# Patient Record
Sex: FEMALE | Race: WHITE | Employment: FULL TIME | URBAN - METROPOLITAN AREA
[De-identification: names, ages, dates, MRNs, and addresses within clinical notes are randomized per-mention and may not be internally consistent; named-entity substitution may affect disease eponyms.]

---

## 2017-01-20 ENCOUNTER — HOSPITAL ENCOUNTER (OUTPATIENT)
Dept: LAB | Age: 67
Discharge: HOME | End: 2017-01-20
Payer: COMMERCIAL

## 2017-01-20 ENCOUNTER — OFFICE VISIT (OUTPATIENT)
Dept: INTERNAL MEDICINE CLINIC | Age: 67
End: 2017-01-20

## 2017-01-20 VITALS
TEMPERATURE: 98.2 F | HEIGHT: 65 IN | OXYGEN SATURATION: 97 % | WEIGHT: 202.6 LBS | RESPIRATION RATE: 16 BRPM | SYSTOLIC BLOOD PRESSURE: 138 MMHG | HEART RATE: 71 BPM | BODY MASS INDEX: 33.76 KG/M2 | DIASTOLIC BLOOD PRESSURE: 80 MMHG

## 2017-01-20 DIAGNOSIS — R10.11 RIGHT UPPER QUADRANT ABDOMINAL PAIN: Primary | ICD-10-CM

## 2017-01-20 DIAGNOSIS — R10.11 RIGHT UPPER QUADRANT ABDOMINAL PAIN: ICD-10-CM

## 2017-01-20 LAB
ALBUMIN SERPL BCP-MCNC: 4.4 G/DL (ref 3.4–5)
ALBUMIN/GLOB SERPL: 1.6 {RATIO} (ref 0.8–1.7)
ALP SERPL-CCNC: 42 U/L (ref 45–117)
ALT SERPL-CCNC: 26 U/L (ref 13–56)
ANION GAP BLD CALC-SCNC: 11 MMOL/L (ref 3–18)
AST SERPL W P-5'-P-CCNC: 15 U/L (ref 15–37)
BASOPHILS # BLD AUTO: 0.1 K/UL (ref 0–0.06)
BASOPHILS # BLD: 1 % (ref 0–2)
BILIRUB DIRECT SERPL-MCNC: 0.2 MG/DL (ref 0–0.2)
BILIRUB SERPL-MCNC: 0.5 MG/DL (ref 0.2–1)
BUN SERPL-MCNC: 21 MG/DL (ref 7–18)
BUN/CREAT SERPL: 23 (ref 12–20)
CALCIUM SERPL-MCNC: 9.4 MG/DL (ref 8.5–10.1)
CHLORIDE SERPL-SCNC: 98 MMOL/L (ref 100–108)
CO2 SERPL-SCNC: 28 MMOL/L (ref 21–32)
CREAT SERPL-MCNC: 0.91 MG/DL (ref 0.6–1.3)
DIFFERENTIAL METHOD BLD: ABNORMAL
EOSINOPHIL # BLD: 0.3 K/UL (ref 0–0.4)
EOSINOPHIL NFR BLD: 3 % (ref 0–5)
ERYTHROCYTE [DISTWIDTH] IN BLOOD BY AUTOMATED COUNT: 13.8 % (ref 11.6–14.5)
GLOBULIN SER CALC-MCNC: 2.7 G/DL (ref 2–4)
GLUCOSE SERPL-MCNC: 99 MG/DL (ref 74–99)
HCT VFR BLD AUTO: 35.1 % (ref 35–45)
HGB BLD-MCNC: 11.2 G/DL (ref 12–16)
LIPASE SERPL-CCNC: 368 U/L (ref 73–393)
LYMPHOCYTES # BLD AUTO: 30 % (ref 21–52)
LYMPHOCYTES # BLD: 2.8 K/UL (ref 0.9–3.6)
MCH RBC QN AUTO: 26.7 PG (ref 24–34)
MCHC RBC AUTO-ENTMCNC: 31.9 G/DL (ref 31–37)
MCV RBC AUTO: 83.8 FL (ref 74–97)
MONOCYTES # BLD: 0.7 K/UL (ref 0.05–1.2)
MONOCYTES NFR BLD AUTO: 7 % (ref 3–10)
NEUTS SEG # BLD: 5.7 K/UL (ref 1.8–8)
NEUTS SEG NFR BLD AUTO: 59 % (ref 40–73)
PLATELET # BLD AUTO: 251 K/UL (ref 135–420)
PMV BLD AUTO: 11 FL (ref 9.2–11.8)
POTASSIUM SERPL-SCNC: 4.3 MMOL/L (ref 3.5–5.5)
PROT SERPL-MCNC: 7.1 G/DL (ref 6.4–8.2)
RBC # BLD AUTO: 4.19 M/UL (ref 4.2–5.3)
SODIUM SERPL-SCNC: 137 MMOL/L (ref 136–145)
WBC # BLD AUTO: 9.4 K/UL (ref 4.6–13.2)

## 2017-01-20 PROCEDURE — 82248 BILIRUBIN DIRECT: CPT | Performed by: PHYSICIAN ASSISTANT

## 2017-01-20 PROCEDURE — 80053 COMPREHEN METABOLIC PANEL: CPT | Performed by: PHYSICIAN ASSISTANT

## 2017-01-20 PROCEDURE — 36415 COLL VENOUS BLD VENIPUNCTURE: CPT | Performed by: PHYSICIAN ASSISTANT

## 2017-01-20 PROCEDURE — 85025 COMPLETE CBC W/AUTO DIFF WBC: CPT | Performed by: PHYSICIAN ASSISTANT

## 2017-01-20 PROCEDURE — 83690 ASSAY OF LIPASE: CPT | Performed by: PHYSICIAN ASSISTANT

## 2017-01-20 NOTE — PATIENT INSTRUCTIONS
Learning About Acute Cholecystitis  What is cholecystitis? Cholecystitis (say \"koh-lih-sis-TY-tus\") is inflammation of the gallbladder. The gallbladder stores bile. Bile helps the body digest food. Normally, the bile flows from the gallbladder to the small intestine. A gallstone stuck in the cystic duct is most often the cause of sudden (acute) cholecystitis. The cystic duct is the tube that carries the bile out of the gallbladder. The gallstone blocks the bile from leaving the gallbladder. This results in an irritated and swollen gallbladder. The disease can also be caused by infection or trauma, such as an injury from a car accident. Cholecystitis has to be treated right away. You will probably have to go to the hospital. Surgery is the usual treatment. What are the symptoms? Symptoms include:  · Steady and severe pain in the upper right part of belly. This is the most common symptom. The pain can sometimes move to your back or right shoulder blade. It may last for more than 6 hours. · Nausea or vomiting. · A fever. How is it treated? The main way to treat this disease is surgery to remove the gallbladder. This surgery can often be done through small cuts (incisions) in the belly. This is called a laparoscopic cholecystectomy. In some cases, you may need a more extensive surgery. You may need surgery as soon as possible. The doctor may try to reduce swelling and irritation in the gallbladder before removing it. You may be given fluids and antibiotics through an IV. You may also be given pain medicine. Follow-up care is a key part of your treatment and safety. Be sure to make and go to all appointments, and call your doctor if you are having problems. It's also a good idea to know your test results and keep a list of the medicines you take. Where can you learn more? Go to http://sravan-arcadio.info/.   Enter T940 in the search box to learn more about \"Learning About Acute Cholecystitis. \"  Current as of: August 9, 2016  Content Version: 11.1  © 4712-5848 [employer]. Care instructions adapted under license by [employer] (which disclaims liability or warranty for this information). If you have questions about a medical condition or this instruction, always ask your healthcare professional. [address] any warranty or liability for your use of this information. Ultrasound of the Abdomen: About This Test  What is it? An abdominal ultrasound uses reflected sound waves to produce a picture of the organs and blood vessels in the upper belly. These include your liver, gallbladder, spleen, pancreas, kidneys, and major blood vessels like the aorta. The sound waves create a picture on a video monitor. Why is this test done? An ultrasound can be done to:  · Find out what's causing belly pain--a gallstone, for example. · Look at a lump or mass that was found in a prior exam and, in some cases, tell what it is. · Check for problems in the liver and kidneys. · Look for changes in an aortic aneurysm, a bulging section in the wall of the large artery that carries blood out of the heart. · Find fluid in the belly. · Guide needles or other medical instruments in treatment. How can you prepare for the test?  · Depending on the part of your belly your doctor will be looking at, you may need to eat a fat-free meal the night before your test. Or you may need to avoid eating for 4 to 12 hours before the test. Your doctor will tell you what to do. What happens before the test?  · You will need to take off any jewelry that might interfere with the ultrasound test.  · You will need to take off your clothes around the area to be scanned. You will get a cloth or paper covering to use during the test.  What happens during the test?  · You lie down on your back or your side on an exam table.   · The doctor or technologist spreads some warm gel on your belly to improve the transmission of the sound waves. A small handheld unit called a transducer is pressed against your belly and is moved back and forth. A picture of the organs and blood vessels can be seen on a video monitor. · You need to lie still. You may be asked to take a breath and hold it for several seconds during the scanning. What else should you know about the test?  · The ultrasound is a very safe procedure. · There is no discomfort from the test itself. If your belly hurts already, you will feel some pain from the probe being pressed down on it. · You will not hear or feel the sound waves. How long does the test take? · The test will take about 30 minutes. · You may be asked to wait until the radiologist has reviewed the scan. He or she may want to do more ultrasound views of some areas of your belly. What happens after the test?  · You will probably be able to go home right away. · You can go back to your usual activities right away. Follow-up care is a key part of your treatment and safety. Be sure to make and go to all appointments, and call your doctor if you are having problems. It's also a good idea to keep a list of the medicines you take. Ask your doctor when you can expect to have your test results. Where can you learn more? Go to http://sravan-arcadio.info/. Enter T376 in the search box to learn more about \"Ultrasound of the Abdomen: About This Test.\"  Current as of: February 19, 2016  Content Version: 11.1  © 9351-4505 [employer]. Care instructions adapted under license by [employer] (which disclaims liability or warranty for this information). If you have questions about a medical condition or this instruction, always ask your healthcare professional. [place] [address] any warranty or liability for your use of this information.        Abdominal Pain: Care Instructions  Your Care Instructions    Abdominal pain has many possible causes. Some aren't serious and get better on their own in a few days. Others need more testing and treatment. If your pain continues or gets worse, you need to be rechecked and may need more tests to find out what is wrong. You may need surgery to correct the problem. Don't ignore new symptoms, such as fever, nausea and vomiting, urination problems, pain that gets worse, and dizziness. These may be signs of a more serious problem. Your doctor may have recommended a follow-up visit in the next 8 to 12 hours. If you are not getting better, you may need more tests or treatment. The doctor has checked you carefully, but problems can develop later. If you notice any problems or new symptoms, get medical treatment right away. Follow-up care is a key part of your treatment and safety. Be sure to make and go to all appointments, and call your doctor if you are having problems. It's also a good idea to know your test results and keep a list of the medicines you take. How can you care for yourself at home? · Rest until you feel better. · To prevent dehydration, drink plenty of fluids, enough so that your urine is light yellow or clear like water. Choose water and other caffeine-free clear liquids until you feel better. If you have kidney, heart, or liver disease and have to limit fluids, talk with your doctor before you increase the amount of fluids you drink. · If your stomach is upset, eat mild foods, such as rice, dry toast or crackers, bananas, and applesauce. Try eating several small meals instead of two or three large ones. · Wait until 48 hours after all symptoms have gone away before you have spicy foods, alcohol, and drinks that contain caffeine. · Do not eat foods that are high in fat. · Avoid anti-inflammatory medicines such as aspirin, ibuprofen (Advil, Motrin), and naproxen (Aleve). These can cause stomach upset. Talk to your doctor if you take daily aspirin for another health problem.   When should you call for help? Call 911 anytime you think you may need emergency care. For example, call if:  · You passed out (lost consciousness). · You pass maroon or very bloody stools. · You vomit blood or what looks like coffee grounds. · You have new, severe belly pain. Call your doctor now or seek immediate medical care if:  · Your pain gets worse, especially if it becomes focused in one area of your belly. · You have a new or higher fever. · Your stools are black and look like tar, or they have streaks of blood. · You have unexpected vaginal bleeding. · You have symptoms of a urinary tract infection. These may include:  ¨ Pain when you urinate. ¨ Urinating more often than usual.  ¨ Blood in your urine. · You are dizzy or lightheaded, or you feel like you may faint. Watch closely for changes in your health, and be sure to contact your doctor if:  · You are not getting better after 1 day (24 hours). Where can you learn more? Go to http://sravan-arcadio.info/. Enter V350 in the search box to learn more about \"Abdominal Pain: Care Instructions. \"  Current as of: May 27, 2016  Content Version: 11.1  © 7101-2451 [employer]. Care instructions adapted under license by [employer] (which disclaims liability or warranty for this information). If you have questions about a medical condition or this instruction, always ask your healthcare professional. [place] [address] any warranty or liability for your use of this information.

## 2017-01-20 NOTE — PROGRESS NOTES
Gallbladder History and Physical    Subjective:     Patient is a 78 y.o.  female who presents with abdominal pain. Onset of symptoms was yesterday evening before dinner. with rapidly worsening course since that time then abatement of symptoms during the early morning after taking Aleve and resting. The pain is located in the RUQ without radiation. Patient describes the pain as sharp and 9/10 at its worst and 1/10 dull but consistent at its least.  Pain is not related to meals. She denies CP, SOB, dizziness, palpitations, recent trauma or events inciting possible M/S trauma. ROS as pertinent outside of HPI:  General: alert, oriented x 3 and in no acute distress  GI: denies blood/tarry stools, denies nausea, vomiting, constipation or diarrhea. She denies pruritis.     : denies vaginal bleeding, cramping, dysuria      Patient Active Problem List    Diagnosis Date Noted    Lung nodules 12/08/2016    Cataract of both eyes 05/17/2016    Dry eyes, bilateral 05/17/2016    Vitreous floaters of both eyes 05/17/2016    Arthritis of knee 02/08/2016    Primary osteoarthritis of right knee 01/23/2016    Absolute anemia 12/04/2015    Left thyroid nodule 06/10/2014    Hyperlipidemia 06/22/2010    GERD (gastroesophageal reflux disease) 06/22/2010    Gout 06/22/2010    Vitamin D deficiency 06/22/2010    Type 2 diabetes mellitus ([address] 06/22/2010    HTN (hypertension) 06/22/2010     Past Medical History   Diagnosis Date    Arthritis     Candida intertrigo 7/11/2011    Diabetes ([address]     GERD (gastroesophageal reflux disease)     Gout     Hypercholesterolemia     Hypertension       Past Surgical History   Procedure Laterality Date    Pr colonoscopy flx dx w/collj spec when pfrmd  7-23-09     normal;     Hx urological  1998; 1999     Bladder surgery    Hx breast biopsy       Calcium deposits    Hx saqib and bso      Hx dilation and curettage      Hx hysterectomy      Hx orthopaedic      Hx knee replacement       Right    Hx heent  07/2016     cataract surgery bilaterally      Social History   Substance Use Topics    Smoking status: Never Smoker    Smokeless tobacco: Never Used    Alcohol use No      Family History   Problem Relation Age of Onset    Hypertension Father     Heart Disease Father     Hypertension Brother     Diabetes Brother     Heart Disease Mother     Breast Cancer Other       Prior to Admission medications    Medication Sig Start Date End Date Taking? Authorizing Provider   atorvastatin (LIPITOR) 80 mg tablet Take 1 Tab by mouth daily. 1/2/17  Yes Cecelia Gomez DO   metFORMIN (GLUCOPHAGE) 1,000 mg tablet Take 1 Tab by mouth two (2) times daily (with meals). 1/2/17  Yes Cecelia Gomez DO   potassium chloride (K-DUR, KLOR-CON) 20 mEq tablet Take 1 Tab by mouth daily. 10/11/16  Yes Cecelia Gomez DO   valsartan-hydrochlorothiazide (DIOVAN-HCT) 160-25 mg per tablet take 1 tablet by mouth once daily 10/11/16  Yes Cecelia Gomez DO   fenofibrate nanocrystallized (TRICOR) 145 mg tablet Take 1 Tab by mouth daily. 10/11/16  Yes Cecelia Gomez DO   ferrous sulfate 325 mg (65 mg iron) tablet Take 1 Tab by mouth Daily (before breakfast). 10/11/16  Yes Cecelia Gomez DO   docusate sodium (COLACE) 100 mg capsule Take 1 capsule three times per week as needed for constipation 10/11/16  Yes Cecelia Gomez DO   allopurinol (ZYLOPRIM) 300 mg tablet Take 1 Tab by mouth daily. 9/1/16  Yes Cecelia Gomez DO   aspirin delayed-release 81 mg tablet Take  by mouth daily. Yes Historical Provider   cyanocobalamin ER (VITAMIN B-12) 1,000 mcg tablet Take 1 tablet by mouth daily. 10/13/14  Yes Chipper Dakins, MD   cholecalciferol (VITAMIN D3) 5,000 unit capsule Take 5,000 Units by mouth daily. Yes Historical Provider   omeprazole (PRILOSEC OTC) 20 mg tablet Take 20 mg by mouth daily.    Yes Historical Provider     Allergies   Allergen Reactions    Sulfa (Sulfonamide Antibiotics) Other (comments)     nightmares            Objective:     Physical Exam:      OBJECTIVE:     Visit Vitals    /80 (BP 1 Location: Left arm, BP Patient Position: Sitting)    Pulse 71    Temp 98.2 °F (36.8 °C) (Oral)    Resp 16    Ht 5' 5\" (1.651 m)    Wt 202 lb 9.6 oz (91.9 kg)    SpO2 97%    BMI 33.71 kg/m2     HEENT:ENT normal.  Neck supple. No adenopathy   Chest: Lungs are clear, good air entry, no wheezes, rhonchi or rales. Cardiovascular: S1 and S2 normal, no murmurs, regular rate and rhythm. Abdomen: NBS, soft, no guarding, mass or organomegaly, negative Rosving's, positive Buenrostro's   Extremities: show no edema  Neurological: is normal, no focal findings. M/S: pain not reproduced with general movement or stretch, good ROM with upper body movement. Assessment and Plan:      Lolly was seen today for side pain. Diagnoses and all orders for this visit:    Right upper quadrant abdominal pain  -     CBC WITH AUTOMATED DIFF; Future  -     METABOLIC PANEL, COMPREHENSIVE; Future  -     LIPASE; Future  -     HEPATIC FUNCTION PANEL; Future  -     US GALLBLADDER; Future      Medication and side effects, including risks and signs of allergy were discussed. Patient given printed information with diagnoses and medication information. Answered all questions and patient acknowledged understanding. Patient agrees to follow up with PCP as suggested or sooner if symptoms worsen. Patient made aware that eating fatty foods may increase pain, if associated with the gallbladder. Follow up after ultrasound. Ms. Calles has a reminder for a \"due or due soon\" health maintenance. I have asked that she contact her primary care provider for follow-up on this health maintenance. Dionne Robles MPA, PA-C     I reviewed the patient's medical history, the physician assistant's findings on physical examination, the patient's diagnoses, and treatment plan as documented in the progress note.  I concur with the treatment plan as documented. There are no additional recommendations at this time.     Mic Bray MD

## 2017-01-20 NOTE — MR AVS SNAPSHOT
Visit Information Date & Time Provider Department Dept. Phone Encounter #  
 1/20/2017  1:00 PM Grazyna Haley PA-C Internists at [facility] [id number] Follow-up Instructions Return in about 1 week (around 1/27/2017), or if symptoms worsen or fail to improve, for PCP. Your Appointments 4/4/2017  8:05 AM  
LAB with Alen Alberts DO Internists at [facility] (--) Appt Note: 6 month follow up and labs [address] [address]  
[phone]  
  
   
 [address] [id number] 4/11/2017  1:15 PM  
Office Visit with Alen Alberts DO Internists at [facility] (--) Appt Note: 6 month follow up and labs [address] [id number] [phone]  
  
   
 [address],[facility] [address] [phone] Upcoming Health Maintenance Date Due Hepatitis C Screening 1950 DTaP/Tdap/Td series (1 - Tdap) 10/9/2008 ZOSTER VACCINE AGE 60> 6/2/2009 Pneumococcal 65+ Low/Medium Risk (2 of 2 - PCV13) 6/11/2015 FOOT EXAM Q1 6/23/2016 HEMOGLOBIN A1C Q6M 4/4/2017 EYE EXAM RETINAL OR DILATED Q1 6/27/2017 MICROALBUMIN Q1 10/4/2017 LIPID PANEL Q1 10/4/2017 MEDICARE YEARLY EXAM 10/12/2017 GLAUCOMA SCREENING Q2Y 6/27/2018 BREAST CANCER SCRN MAMMOGRAM 8/25/2018 COLONOSCOPY 7/23/2019 Allergies as of 1/20/2017  Review Complete On: 1/20/2017 By: Grazyna Haley PA-C Severity Noted Reaction Type Reactions Sulfa (Sulfonamide Antibiotics)  06/22/2010    Other (comments)  
 nightmares Current Immunizations  Reviewed on 10/13/2014 Name Date Influenza Vaccine 10/13/2014, 12/16/2013, 12/24/2012 Influenza Vaccine (Quad) PF 10/11/2016, 12/1/2015  2:30 PM  
 Influenza Vaccine Split 11/30/2011 Pneumococcal Polysaccharide (PPSV-23) 6/11/2014 TD Vaccine 10/8/2008 Not reviewed this visit You Were Diagnosed With   
  
 Codes Comments Right upper quadrant abdominal pain    -  Primary ICD-10-CM: R10.11 ICD-9-CM: 789.01 Vitals BP Pulse Temp Resp Height(growth percentile) Weight(growth percentile) 138/80 (BP 1 Location: Left arm, BP Patient Position: Sitting) 71 98.2 °F (36.8 °C) (Oral) 16 5' 5\" (1.651 m) 202 lb 9.6 oz (91.9 kg) SpO2 BMI OB Status Smoking Status 97% 33.71 kg/m2 Hysterectomy Never Smoker Vitals History BMI and BSA Data Body Mass Index Body Surface Area 33.71 kg/m 2 2.05 m 2 Preferred Pharmacy Pharmacy Name [facility] [address] [place], [place] [address] [place] [address] [phone] Your Updated Medication List  
  
   
This list is accurate as of: 1/20/17  2:09 PM.  Always use your most recent med list.  
  
  
  
  
 allopurinol 300 mg tablet Commonly known as:  Asia Huh Take 1 Tab by mouth daily. aspirin delayed-release 81 mg tablet Take  by mouth daily. atorvastatin 80 mg tablet Commonly known as:  LIPITOR Take 1 Tab by mouth daily. cholecalciferol 5,000 unit capsule Commonly known as:  VITAMIN D3 Take 5,000 Units by mouth daily. cyanocobalamin ER 1,000 mcg tablet Commonly known as:  VITAMIN B-12 Take 1 tablet by mouth daily. docusate sodium 100 mg capsule Commonly known as:  Bessie Lowers Take 1 capsule three times per week as needed for constipation  
  
 fenofibrate nanocrystallized 145 mg tablet Commonly known as:  [employer] Take 1 Tab by mouth daily. ferrous sulfate 325 mg (65 mg iron) tablet Take 1 Tab by mouth Daily (before breakfast). metFORMIN 1,000 mg tablet Commonly known as:  GLUCOPHAGE Take 1 Tab by mouth two (2) times daily (with meals). potassium chloride 20 mEq tablet Commonly known as:  K-DUR, KLOR-CON Take 1 Tab by mouth daily. PriLOSEC OTC 20 mg tablet Generic drug:  omeprazole Take 20 mg by mouth daily. valsartan-hydroCHLOROthiazide 160-25 mg per tablet Commonly known as:  DIOVAN-HCT  
take 1 tablet by mouth once daily Follow-up Instructions Return in about 1 week (around 1/27/2017), or if symptoms worsen or fail to improve, for PCP. To-Do List   
 01/20/2017 Lab:  CBC WITH AUTOMATED DIFF   
  
 01/20/2017 Lab:  HEPATIC FUNCTION PANEL   
  
 01/20/2017 Lab:  LIPASE   
  
 01/20/2017 Lab:  METABOLIC PANEL, COMPREHENSIVE   
  
 01/20/2017 Imaging:  US GALLBLADDER Patient Instructions Learning About Acute Cholecystitis What is cholecystitis? Cholecystitis (say \"koh-lih-sis-TY-tus\") is inflammation of the gallbladder. The gallbladder stores bile. Bile helps the body digest food. Normally, the bile flows from the gallbladder to the small intestine. A gallstone stuck in the cystic duct is most often the cause of sudden (acute) cholecystitis. The cystic duct is the tube that carries the bile out of the gallbladder. The gallstone blocks the bile from leaving the gallbladder. This results in an irritated and swollen gallbladder. The disease can also be caused by infection or trauma, such as an injury from a car accident. Cholecystitis has to be treated right away. You will probably have to go to the hospital. Surgery is the usual treatment. What are the symptoms? Symptoms include: · Steady and severe pain in the upper right part of belly. This is the most common symptom. The pain can sometimes move to your back or right shoulder blade. It may last for more than 6 hours. · Nausea or vomiting. · A fever. How is it treated? The main way to treat this disease is surgery to remove the gallbladder. This surgery can often be done through small cuts (incisions) in the belly. This is called a laparoscopic cholecystectomy. In some cases, you may need a more extensive surgery. You may need surgery as soon as possible. The doctor may try to reduce swelling and irritation in the gallbladder before removing it. You may be given fluids and antibiotics through an IV. You may also be given pain medicine. Follow-up care is a key part of your treatment and safety. Be sure to make and go to all appointments, and call your doctor if you are having problems. It's also a good idea to know your test results and keep a list of the medicines you take. Where can you learn more? Go to http://sravan-arcadio.info/. Enter T940 in the search box to learn more about \"Learning About Acute Cholecystitis. \" Current as of: August 9, 2016 Content Version: 11.1 © 8128-0989 [employer]. Care instructions adapted under license by [employer] (which disclaims liability or warranty for this information). If you have questions about a medical condition or this instruction, always ask your healthcare professional. [place] [address] any warranty or liability for your use of this information. Ultrasound of the Abdomen: About This Test 
What is it? An abdominal ultrasound uses reflected sound waves to produce a picture of the organs and blood vessels in the upper belly. These include your liver, gallbladder, spleen, pancreas, kidneys, and major blood vessels like the aorta. The sound waves create a picture on a video monitor. Why is this test done? An ultrasound can be done to: · Find out what's causing belly paina gallstone, for example. · Look at a lump or mass that was found in a prior exam and, in some cases, tell what it is. · Check for problems in the liver and kidneys. · Look for changes in an aortic aneurysm, a bulging section in the wall of the large artery that carries blood out of the heart. · Find fluid in the belly. · Guide needles or other medical instruments in treatment.  
How can you prepare for the test? 
 · Depending on the part of your belly your doctor will be looking at, you may need to eat a fat-free meal the night before your test. Or you may need to avoid eating for 4 to 12 hours before the test. Your doctor will tell you what to do. What happens before the test? 
· You will need to take off any jewelry that might interfere with the ultrasound test. 
· You will need to take off your clothes around the area to be scanned. You will get a cloth or paper covering to use during the test. 
What happens during the test? 
· You lie down on your back or your side on an exam table. · The doctor or technologist spreads some warm gel on your belly to improve the transmission of the sound waves. A small handheld unit called a transducer is pressed against your belly and is moved back and forth. A picture of the organs and blood vessels can be seen on a video monitor. · You need to lie still. You may be asked to take a breath and hold it for several seconds during the scanning. What else should you know about the test? 
· The ultrasound is a very safe procedure. · There is no discomfort from the test itself. If your belly hurts already, you will feel some pain from the probe being pressed down on it. · You will not hear or feel the sound waves. How long does the test take? · The test will take about 30 minutes. · You may be asked to wait until the radiologist has reviewed the scan. He or she may want to do more ultrasound views of some areas of your belly. What happens after the test? 
· You will probably be able to go home right away. · You can go back to your usual activities right away. Follow-up care is a key part of your treatment and safety. Be sure to make and go to all appointments, and call your doctor if you are having problems. It's also a good idea to keep a list of the medicines you take. Ask your doctor when you can expect to have your test results. Where can you learn more? Go to http://sravan-arcadio.info/. Enter T376 in the search box to learn more about \"Ultrasound of the Abdomen: About This Test.\" Current as of: February 19, 2016 Content Version: 11.1 © 5743-5888 [employer]. Care instructions adapted under license by [employer] (which disclaims liability or warranty for this information). If you have questions about a medical condition or this instruction, always ask your healthcare professional. [address] any warranty or liability for your use of this information. Abdominal Pain: Care Instructions Your Care Instructions Abdominal pain has many possible causes. Some aren't serious and get better on their own in a few days. Others need more testing and treatment. If your pain continues or gets worse, you need to be rechecked and may need more tests to find out what is wrong. You may need surgery to correct the problem. Don't ignore new symptoms, such as fever, nausea and vomiting, urination problems, pain that gets worse, and dizziness. These may be signs of a more serious problem. Your doctor may have recommended a follow-up visit in the next 8 to 12 hours. If you are not getting better, you may need more tests or treatment. The doctor has checked you carefully, but problems can develop later. If you notice any problems or new symptoms, get medical treatment right away. Follow-up care is a key part of your treatment and safety. Be sure to make and go to all appointments, and call your doctor if you are having problems. It's also a good idea to know your test results and keep a list of the medicines you take. How can you care for yourself at home? · Rest until you feel better. · To prevent dehydration, drink plenty of fluids, enough so that your urine is light yellow or clear like water. Choose water and other caffeine-free clear liquids until you feel better.  If you have kidney, heart, or liver disease and have to limit fluids, talk with your doctor before you increase the amount of fluids you drink. · If your stomach is upset, eat mild foods, such as rice, dry toast or crackers, bananas, and applesauce. Try eating several small meals instead of two or three large ones. · Wait until 48 hours after all symptoms have gone away before you have spicy foods, alcohol, and drinks that contain caffeine. · Do not eat foods that are high in fat. · Avoid anti-inflammatory medicines such as aspirin, ibuprofen (Advil, Motrin), and naproxen (Aleve). These can cause stomach upset. Talk to your doctor if you take daily aspirin for another health problem. When should you call for help? Call 911 anytime you think you may need emergency care. For example, call if: 
· You passed out (lost consciousness). · You pass maroon or very bloody stools. · You vomit blood or what looks like coffee grounds. · You have new, severe belly pain. Call your doctor now or seek immediate medical care if: 
· Your pain gets worse, especially if it becomes focused in one area of your belly. · You have a new or higher fever. · Your stools are black and look like tar, or they have streaks of blood. · You have unexpected vaginal bleeding. · You have symptoms of a urinary tract infection. These may include: 
¨ Pain when you urinate. ¨ Urinating more often than usual. 
¨ Blood in your urine. · You are dizzy or lightheaded, or you feel like you may faint. Watch closely for changes in your health, and be sure to contact your doctor if: 
· You are not getting better after 1 day (24 hours). Where can you learn more? Go to http://sravan-arcadio.info/. Enter L291 in the search box to learn more about \"Abdominal Pain: Care Instructions. \" Current as of: May 27, 2016 Content Version: 11.1 © 2192-9165 [employer].  Care instructions adapted under license by [address] (which disclaims liability or warranty for this information). If you have questions about a medical condition or this instruction, always ask your healthcare professional. [address] any warranty or liability for your use of this information. Introducing [facility]! Chrissy Liz introduces [employer] patient portal. Now you can access parts of your medical record, email your doctor's office, and request medication refills online. 1. In your internet browser, go to [email or web address] 2. Click on the First Time User? Click Here link in the Sign In box. You will see the New Member Sign Up page. 3. Enter your [employer] Access Code exactly as it appears below. You will not need to use this code after youve completed the sign-up process. If you do not sign up before the expiration date, you must request a new code. · [employer] Access Code: [email or web address] Expires: 4/20/2017  2:02 PM 
 
4. Enter the last four digits of your Social Security Number (xxxx) and Date of Birth (mm/dd/yyyy) as indicated and click Submit. You will be taken to the next sign-up page. 5. Create a [employer] ID. This will be your [employer] login ID and cannot be changed, so think of one that is secure and easy to remember. 6. Create a [employer]riends password. You can change your password at any time. 7. Enter your Password Reset Question and Answer. This can be used at a later time if you forget your password. 8. Enter your e-mail address. You will receive e-mail notification when new information is available in [address] 9. Click Sign Up. You can now view and download portions of your medical record. 10. Click the Download Summary menu link to download a portable copy of your medical information. If you have questions, please visit the Frequently Asked Questions section of the [employer] website.  Remember, [employer] is NOT to be used for urgent needs. For medical emergencies, dial 911. Now available from your iPhone and Android! Please provide this summary of care documentation to your next provider. Your primary care clinician is listed as Elgie Caller. If you have any questions after today's visit, please call [phone].

## 2017-01-20 NOTE — PROGRESS NOTES
Patient is in the office today with c/o pain in her right side. Pain is 1/10 now but was 9/10 last night. Do you have an Advance Directive yes  She will bring a copy to have filed    1. Have you been to the ER, urgent care clinic since your last visit? Hospitalized since your last visit? No    2. Have you seen or consulted any other health care providers outside of the [address] [place] [address] since your last visit? Include any pap smears or colon screening.  No

## 2017-01-23 ENCOUNTER — HOSPITAL ENCOUNTER (OUTPATIENT)
Dept: ULTRASOUND IMAGING | Age: 67
Discharge: HOME | End: 2017-01-23
Attending: PHYSICIAN ASSISTANT
Payer: COMMERCIAL

## 2017-01-23 ENCOUNTER — TELEPHONE (OUTPATIENT)
Dept: INTERNAL MEDICINE CLINIC | Age: 67
End: 2017-01-23

## 2017-01-23 DIAGNOSIS — R10.11 RIGHT UPPER QUADRANT ABDOMINAL PAIN: ICD-10-CM

## 2017-01-23 PROCEDURE — 76705 ECHO EXAM OF ABDOMEN: CPT

## 2017-01-23 NOTE — TELEPHONE ENCOUNTER
Pt returned call from [facility] and said please call her back.  She is home now

## 2017-01-23 NOTE — TELEPHONE ENCOUNTER
I called patient and left  for patient to return call to discuss results of ultrasound. Gallbladder looks okay and blood work did not show evidence of abnormalities with liver or pancreas. I believe the pain to be musculo-skeletal at this point. However, if it does not resolve with rest, NSAIDs, ice/heat, she should call the office for follow up. Please try patient again this afternoon or in the morning.

## 2017-02-10 ENCOUNTER — TELEPHONE (OUTPATIENT)
Dept: INTERNAL MEDICINE CLINIC | Age: 67
End: 2017-02-10

## 2017-02-10 ENCOUNTER — OFFICE VISIT (OUTPATIENT)
Dept: INTERNAL MEDICINE CLINIC | Age: 67
End: 2017-02-10

## 2017-02-10 ENCOUNTER — HOSPITAL ENCOUNTER (OUTPATIENT)
Dept: LAB | Age: 67
Discharge: HOME | End: 2017-02-10
Payer: COMMERCIAL

## 2017-02-10 VITALS
TEMPERATURE: 98.8 F | BODY MASS INDEX: 33.99 KG/M2 | WEIGHT: 204 LBS | HEART RATE: 91 BPM | DIASTOLIC BLOOD PRESSURE: 55 MMHG | OXYGEN SATURATION: 97 % | RESPIRATION RATE: 16 BRPM | HEIGHT: 65 IN | SYSTOLIC BLOOD PRESSURE: 126 MMHG

## 2017-02-10 DIAGNOSIS — N39.0 URINARY TRACT INFECTION, SITE UNSPECIFIED: Primary | ICD-10-CM

## 2017-02-10 DIAGNOSIS — R30.0 DYSURIA: ICD-10-CM

## 2017-02-10 DIAGNOSIS — N39.0 URINARY TRACT INFECTION, SITE UNSPECIFIED: ICD-10-CM

## 2017-02-10 LAB
BILIRUB UR QL STRIP: NEGATIVE
GLUCOSE UR-MCNC: NEGATIVE MG/DL
KETONES P FAST UR STRIP-MCNC: NEGATIVE MG/DL
PH UR STRIP: 6.5 [PH] (ref 4.6–8)
PROT UR QL STRIP: NORMAL MG/DL
SP GR UR STRIP: 1.01 (ref 1–1.03)
UA UROBILINOGEN AMB POC: NORMAL (ref 0.2–1)
URINALYSIS CLARITY POC: NORMAL
URINALYSIS COLOR POC: YELLOW
URINE BLOOD POC: NORMAL
URINE LEUKOCYTES POC: NORMAL
URINE NITRITES POC: NEGATIVE

## 2017-02-10 PROCEDURE — 87086 URINE CULTURE/COLONY COUNT: CPT | Performed by: FAMILY MEDICINE

## 2017-02-10 PROCEDURE — 87186 SC STD MICRODIL/AGAR DIL: CPT | Performed by: FAMILY MEDICINE

## 2017-02-10 PROCEDURE — 87077 CULTURE AEROBIC IDENTIFY: CPT | Performed by: FAMILY MEDICINE

## 2017-02-10 RX ORDER — NITROFURANTOIN 25; 75 MG/1; MG/1
100 CAPSULE ORAL 2 TIMES DAILY
Qty: 14 CAP | Refills: 0 | Status: SHIPPED | OUTPATIENT
Start: 2017-02-10 | End: 2017-02-17

## 2017-02-10 NOTE — PROGRESS NOTES
Pt is here for dysuria that started this morning. Do you have an advance directive yes    Pt mychart activation pending. 1. Have you been to the ER, urgent care clinic since your last visit? Hospitalized since your last visit? No    2. Have you seen or consulted any other health care providers outside of the [facility] since your last visit? Include any pap smears or colon screening.  No

## 2017-02-10 NOTE — TELEPHONE ENCOUNTER
Pt calling to request medication for burning with urination since last night     Pharmacy is [facility], [address] [place] [address],[address]

## 2017-02-10 NOTE — MR AVS SNAPSHOT
Visit Information Date & Time Provider Department Dept. Phone Encounter #  
 2/10/2017  3:00 PM Regina Paul DO [facility] [phone] Follow-up Instructions Return if symptoms worsen or fail to improve. Your Appointments 4/4/2017  8:05 AM  
LAB with Regina Paul DO [facility] (--) Appt Note: 6 month follow up and labs [address] [id number] [phone]  
  
   
 [address] [phone] 4/11/2017  1:15 PM  
Office Visit with Regina Paul DO [facility] (--) Appt Note: 6 month follow up and labs [address] [id number] [phone]  
  
   
 [address] [phone] Upcoming Health Maintenance Date Due Hepatitis C Screening 1950 DTaP/Tdap/Td series (1 - Tdap) 10/9/2008 ZOSTER VACCINE AGE 60> 6/2/2009 Pneumococcal 65+ Low/Medium Risk (2 of 2 - PCV13) 6/11/2015 FOOT EXAM Q1 6/23/2016 HEMOGLOBIN A1C Q6M 4/4/2017 EYE EXAM RETINAL OR DILATED Q1 6/27/2017 MICROALBUMIN Q1 10/4/2017 LIPID PANEL Q1 10/4/2017 MEDICARE YEARLY EXAM 10/12/2017 GLAUCOMA SCREENING Q2Y 6/27/2018 BREAST CANCER SCRN MAMMOGRAM 8/25/2018 COLONOSCOPY 7/23/2019 Allergies as of 2/10/2017  Review Complete On: 2/10/2017 By: Regina Paul DO Severity Noted Reaction Type Reactions Sulfa (Sulfonamide Antibiotics)  06/22/2010    Other (comments)  
 nightmares Current Immunizations  Reviewed on 10/13/2014 Name Date Influenza Vaccine 10/13/2014, 12/16/2013, 12/24/2012 Influenza Vaccine (Quad) PF 10/11/2016, 12/1/2015  2:30 PM  
 Influenza Vaccine Split 11/30/2011 Pneumococcal Polysaccharide (PPSV-23) 6/11/2014 TD Vaccine 10/8/2008 Not reviewed this visit You Were Diagnosed With   
  
 Codes Comments Urinary tract infection, site unspecified    -  Primary ICD-10-CM: N39.0 Dysuria     ICD-10-CM: R30.0 ICD-9-CM: 974. 1 Vitals BP Pulse Temp Resp Height(growth percentile) Weight(growth percentile) 126/55 91 98.8 °F (37.1 °C) (Oral) 16 5' 5\" (1.651 m) 204 lb (92.5 kg) SpO2 BMI OB Status Smoking Status 97% 33.95 kg/m2 Hysterectomy Never Smoker Vitals History BMI and BSA Data Body Mass Index Body Surface Area 33.95 kg/m 2 2.06 m 2 Preferred Pharmacy Pharmacy Name [facility] [address] , [facility] [place] [address] [phone] Your Updated Medication List  
  
   
This list is accurate as of: 2/10/17  3:51 PM.  Always use your most recent med list.  
  
  
  
  
 allopurinol 300 mg tablet Commonly known as:  Keyshawn Cons Take 1 Tab by mouth daily. aspirin delayed-release 81 mg tablet Take  by mouth daily. atorvastatin 80 mg tablet Commonly known as:  LIPITOR Take 1 Tab by mouth daily. cholecalciferol 5,000 unit capsule Commonly known as:  VITAMIN D3 Take 5,000 Units by mouth daily. cyanocobalamin ER 1,000 mcg tablet Commonly known as:  VITAMIN B-12 Take 1 tablet by mouth daily. docusate sodium 100 mg capsule Commonly known as:  Krish Barefoot Take 1 capsule three times per week as needed for constipation  
  
 fenofibrate nanocrystallized 145 mg tablet Commonly known as:  [employer] Take 1 Tab by mouth daily. ferrous sulfate 325 mg (65 mg iron) tablet Take 1 Tab by mouth Daily (before breakfast). metFORMIN 1,000 mg tablet Commonly known as:  GLUCOPHAGE Take 1 Tab by mouth two (2) times daily (with meals). nitrofurantoin (macrocrystal-monohydrate) 100 mg capsule Commonly known as:  MACROBID Take 1 Cap by mouth two (2) times a day for 7 days. potassium chloride 20 mEq tablet Commonly known as:  K-DUR, KLOR-CON Take 1 Tab by mouth daily. PriLOSEC OTC 20 mg tablet Generic drug:  omeprazole Take 20 mg by mouth daily. valsartan-hydroCHLOROthiazide 160-25 mg per tablet Commonly known as:  DIOVAN-HCT  
take 1 tablet by mouth once daily Prescriptions Sent to Pharmacy Refills  
 nitrofurantoin, macrocrystal-monohydrate, (MACROBID) 100 mg capsule 0 Sig: Take 1 Cap by mouth two (2) times a day for 7 days. Class: Normal  
 Pharmacy: [facility] [id number] [place] [address], [address] [place] [address] #: [phone] Route: Oral  
  
We Performed the Following AMB POC URINALYSIS DIP STICK AUTO W/O MICRO [69273 CPT(R)] Follow-up Instructions Return if symptoms worsen or fail to improve. To-Do List   
 02/10/2017 Microbiology:  CULTURE, URINE Patient Instructions Urinary Tract Infection in Women: Care Instructions Your Care Instructions A urinary tract infection, or UTI, is a general term for an infection anywhere between the kidneys and the urethra (where urine comes out). Most UTIs are bladder infections. They often cause pain or burning when you urinate. UTIs are caused by bacteria and can be cured with antibiotics. Be sure to complete your treatment so that the infection goes away. Follow-up care is a key part of your treatment and safety. Be sure to make and go to all appointments, and call your doctor if you are having problems. It's also a good idea to know your test results and keep a list of the medicines you take. How can you care for yourself at home? · Take your antibiotics as directed. Do not stop taking them just because you feel better. You need to take the full course of antibiotics. · Drink extra water and other fluids for the next day or two. This may help wash out the bacteria that are causing the infection. (If you have kidney, heart, or liver disease and have to limit fluids, talk with your doctor before you increase your fluid intake.) · Avoid drinks that are carbonated or have caffeine. They can irritate the bladder. · Urinate often. Try to empty your bladder each time. · To relieve pain, take a hot bath or lay a heating pad set on low over your lower belly or genital area. Never go to sleep with a heating pad in place. To prevent UTIs · Drink plenty of water each day. This helps you urinate often, which clears bacteria from your system. (If you have kidney, heart, or liver disease and have to limit fluids, talk with your doctor before you increase your fluid intake.) · Consider adding cranberry juice to your diet. · Urinate when you need to. · Urinate right after you have sex. · Change sanitary pads often. · Avoid douches, bubble baths, feminine hygiene sprays, and other feminine hygiene products that have deodorants. · After going to the bathroom, wipe from front to back. When should you call for help? Call your doctor now or seek immediate medical care if: · Symptoms such as fever, chills, nausea, or vomiting get worse or appear for the first time. · You have new pain in your back just below your rib cage. This is called flank pain. · There is new blood or pus in your urine. · You have any problems with your antibiotic medicine. Watch closely for changes in your health, and be sure to contact your doctor if: 
· You are not getting better after taking an antibiotic for 2 days. · Your symptoms go away but then come back. Where can you learn more? Go to http://sravan-arcadio.info/. Enter R436 in the search box to learn more about \"Urinary Tract Infection in Women: Care Instructions. \" Current as of: August 12, 2016 Content Version: 11.1 © 7964-6973 [employer]. Care instructions adapted under license by [employer] (which disclaims liability or warranty for this information).  If you have questions about a medical condition or this instruction, always ask your healthcare professional. [address] any warranty or liability for your use of this information. Introducing [facility]! [facility] introduces KIT digital patient portal. Now you can access parts of your medical record, email your doctor's office, and request medication refills online. 1. In your internet browser, go to [email or web address] 2. Click on the First Time User? Click Here link in the Sign In box. You will see the New Member Sign Up page. 3. Enter your KIT digital Access Code exactly as it appears below. You will not need to use this code after youve completed the sign-up process. If you do not sign up before the expiration date, you must request a new code. · KIT digital Access Code: [email or web address] Expires: 4/20/2017  2:02 PM 
 
4. Enter the last four digits of your Social Security Number (xxxx) and Date of Birth (mm/dd/yyyy) as indicated and click Submit. You will be taken to the next sign-up page. 5. Create a KIT digital ID. This will be your KIT digital login ID and cannot be changed, so think of one that is secure and easy to remember. 6. Create a KIT digital password. You can change your password at any time. 7. Enter your Password Reset Question and Answer. This can be used at a later time if you forget your password. 8. Enter your e-mail address. You will receive e-mail notification when new information is available in [address]. 9. Click Sign Up. You can now view and download portions of your medical record. 10. Click the Download Summary menu link to download a portable copy of your medical information. If you have questions, please visit the Frequently Asked Questions section of the KIT digital website. Remember, KIT digital is NOT to be used for urgent needs. For medical emergencies, dial 911. Now available from your iPhone and Android! Please provide this summary of care documentation to your next provider. Your primary care clinician is listed as Alen Alberts. If you have any questions after today's visit, please call [phone].

## 2017-02-10 NOTE — PATIENT INSTRUCTIONS
Urinary Tract Infection in Women: Care Instructions  Your Care Instructions    A urinary tract infection, or UTI, is a general term for an infection anywhere between the kidneys and the urethra (where urine comes out). Most UTIs are bladder infections. They often cause pain or burning when you urinate. UTIs are caused by bacteria and can be cured with antibiotics. Be sure to complete your treatment so that the infection goes away. Follow-up care is a key part of your treatment and safety. Be sure to make and go to all appointments, and call your doctor if you are having problems. It's also a good idea to know your test results and keep a list of the medicines you take. How can you care for yourself at home? · Take your antibiotics as directed. Do not stop taking them just because you feel better. You need to take the full course of antibiotics. · Drink extra water and other fluids for the next day or two. This may help wash out the bacteria that are causing the infection. (If you have kidney, heart, or liver disease and have to limit fluids, talk with your doctor before you increase your fluid intake.)  · Avoid drinks that are carbonated or have caffeine. They can irritate the bladder. · Urinate often. Try to empty your bladder each time. · To relieve pain, take a hot bath or lay a heating pad set on low over your lower belly or genital area. Never go to sleep with a heating pad in place. To prevent UTIs  · Drink plenty of water each day. This helps you urinate often, which clears bacteria from your system. (If you have kidney, heart, or liver disease and have to limit fluids, talk with your doctor before you increase your fluid intake.)  · Consider adding cranberry juice to your diet. · Urinate when you need to. · Urinate right after you have sex. · Change sanitary pads often.   · Avoid douches, bubble baths, feminine hygiene sprays, and other feminine hygiene products that have deodorants. · After going to the bathroom, wipe from front to back. When should you call for help? Call your doctor now or seek immediate medical care if:  · Symptoms such as fever, chills, nausea, or vomiting get worse or appear for the first time. · You have new pain in your back just below your rib cage. This is called flank pain. · There is new blood or pus in your urine. · You have any problems with your antibiotic medicine. Watch closely for changes in your health, and be sure to contact your doctor if:  · You are not getting better after taking an antibiotic for 2 days. · Your symptoms go away but then come back. Where can you learn more? Go to http://sravan-arcadio.info/. Enter E624 in the search box to learn more about \"Urinary Tract Infection in Women: Care Instructions. \"  Current as of: August 12, 2016  Content Version: 11.1  © 5511-5588 [employer]. Care instructions adapted under license by [employer] (which disclaims liability or warranty for this information). If you have questions about a medical condition or this instruction, always ask your healthcare professional. [place] [address] any warranty or liability for your use of this information.

## 2017-02-12 LAB
BACTERIA SPEC CULT: ABNORMAL
SERVICE CMNT-IMP: ABNORMAL

## 2017-02-13 NOTE — PROGRESS NOTES
HISTORY OF PRESENT ILLNESS  Lolly Benz is a 78 y.o. female. HPI  Patient complains of dysuria. Onset was 1 day ago, unchanged since that time. Patient complains of frequency. Patient denies back pain, fever, stomach ache and medication change and OTC medications. Patient does not have a history of recurrent UTI. Patient does not have a history of pyelonephritis. Allergies   Allergen Reactions    Sulfa (Sulfonamide Antibiotics) Other (comments)     nightmares       Past Medical History   Diagnosis Date    Arthritis     Candida intertrigo 7/11/2011    Diabetes ([address]     GERD (gastroesophageal reflux disease)     Gout     Hypercholesterolemia     Hypertension        Family History   Problem Relation Age of Onset    Hypertension Father     Heart Disease Father     Hypertension Brother     Diabetes Brother     Heart Disease Mother     Breast Cancer Other        Social History   Substance Use Topics    Smoking status: Never Smoker    Smokeless tobacco: Never Used    Alcohol use No        Current Outpatient Prescriptions   Medication Sig    nitrofurantoin, macrocrystal-monohydrate, (MACROBID) 100 mg capsule Take 1 Cap by mouth two (2) times a day for 7 days.  atorvastatin (LIPITOR) 80 mg tablet Take 1 Tab by mouth daily.  metFORMIN (GLUCOPHAGE) 1,000 mg tablet Take 1 Tab by mouth two (2) times daily (with meals).  potassium chloride (K-DUR, KLOR-CON) 20 mEq tablet Take 1 Tab by mouth daily.  valsartan-hydrochlorothiazide (DIOVAN-HCT) 160-25 mg per tablet take 1 tablet by mouth once daily    fenofibrate nanocrystallized (TRICOR) 145 mg tablet Take 1 Tab by mouth daily.  ferrous sulfate 325 mg (65 mg iron) tablet Take 1 Tab by mouth Daily (before breakfast).  docusate sodium (COLACE) 100 mg capsule Take 1 capsule three times per week as needed for constipation    allopurinol (ZYLOPRIM) 300 mg tablet Take 1 Tab by mouth daily.     aspirin delayed-release 81 mg tablet Take  by mouth daily.  cyanocobalamin ER (VITAMIN B-12) 1,000 mcg tablet Take 1 tablet by mouth daily.  cholecalciferol (VITAMIN D3) 5,000 unit capsule Take 5,000 Units by mouth daily.  omeprazole (PRILOSEC OTC) 20 mg tablet Take 20 mg by mouth daily. No current facility-administered medications for this visit. Past Surgical History   Procedure Laterality Date    Pr colonoscopy flx dx w/collj spec when pfrmd  7-23-09     normal;     Hx urological  1998; 1999     Bladder surgery    Hx breast biopsy       Calcium deposits    Hx saqib and bso      Hx dilation and curettage      Hx hysterectomy      Hx orthopaedic      Hx knee replacement       Right    Hx heent  07/2016     cataract surgery bilaterally       ROS  See HPI    Visit Vitals    /55    Pulse 91    Temp 98.8 °F (37.1 °C) (Oral)    Resp 16    Ht 5' 5\" (1.651 m)    Wt 204 lb (92.5 kg)    SpO2 97%    BMI 33.95 kg/m2     Physical Exam   Abdominal: Soft. Bowel sounds are normal. She exhibits no distension and no mass. There is no tenderness. There is no rebound, no guarding and no CVA tenderness. Results for orders placed or performed in visit on 02/10/17   AMB POC URINALYSIS DIP STICK AUTO W/O MICRO     Status: None   Result Value Ref Range Status    Color (UA POC) Yellow  Final    Clarity (UA POC) Turbid  Final    Glucose (UA POC) Negative Negative Final    Bilirubin (UA POC) Negative Negative Final    Ketones (UA POC) Negative Negative Final    Specific gravity (UA POC) 1.015 1.001 - 1.035 Final    Blood (UA POC) 3+ Negative Final    pH (UA POC) 6.5 4.6 - 8.0 Final    Protein (UA POC) 1+ Negative mg/dL Final    Urobilinogen (UA POC) 0.2 mg/dL 0.2 - 1 Final    Nitrites (UA POC) Negative Negative Final    Leukocyte esterase (UA POC) 1+ Negative Final       ASSESSMENT and PLAN    ICD-10-CM ICD-9-CM    1.  Urinary tract infection, site unspecified N39.0  CULTURE, URINE      nitrofurantoin, macrocrystal-monohydrate, (MACROBID) 100 mg capsule   2. Dysuria R30.0 788.1 AMB POC URINALYSIS DIP STICK AUTO W/O MICRO     -Advised symptomatic care  -RTC prn    Additional Instructions: The patient understands that they should contact the office at any time if any questions or concerns develop. They are also aware that they can call our main office number at [phone] at any time if they would like to address any concerns with the physician. They also understand that they should dial 911 if any acute emergency arises. The patient understands that they should give us a minimum of 48 hours to complete prescription refills once they are requested. The patient has also been instructed to contact us by calling the main office number if they have not received feedback within 2 weeks of having any tests completed. The patient is a aware that they should read all package insert information when picking up the medications and that they should consult the pharmacist of a physician if they have any questions or concerns regarding the prescribed medications. Discussed with the patient new medications given and patient instructed to read pharmacy literature regarding side effects and drug interactions. Instructions for taking the medications were provided to the patient and the consequences of not taking it. Follow-up Disposition:   Return if symptoms worsen or fail to improve. Risk and benefits of new medication discussed in detail when indicated, patient was given the opportunity to ask questions   AVS provided  reviewed diet, exercise and weight control when indicated  Alarm signals discussed. ER precautions reviewed when indicated  Plan of care reviewed with patient. Understanding verbalized and they are in agreement with plan of care.      Theodore Hagen,

## 2017-03-03 DIAGNOSIS — E87.6 HYPOKALEMIA: ICD-10-CM

## 2017-03-03 DIAGNOSIS — M1A.9XX0 CHRONIC GOUT WITHOUT TOPHUS, UNSPECIFIED CAUSE, UNSPECIFIED SITE: ICD-10-CM

## 2017-03-03 RX ORDER — POTASSIUM CHLORIDE 20 MEQ/1
20 TABLET, EXTENDED RELEASE ORAL DAILY
Qty: 90 TAB | Refills: 1 | Status: SHIPPED | OUTPATIENT
Start: 2017-03-03 | End: 2017-11-08 | Stop reason: SDUPTHER

## 2017-03-03 RX ORDER — ALLOPURINOL 300 MG/1
300 TABLET ORAL DAILY
Qty: 90 TAB | Refills: 1 | Status: SHIPPED | OUTPATIENT
Start: 2017-03-03 | End: 2017-09-13 | Stop reason: SDUPTHER

## 2017-04-04 ENCOUNTER — LAB ONLY (OUTPATIENT)
Dept: INTERNAL MEDICINE CLINIC | Age: 67
End: 2017-04-04

## 2017-04-04 ENCOUNTER — HOSPITAL ENCOUNTER (OUTPATIENT)
Dept: LAB | Age: 67
Discharge: HOME | End: 2017-04-04
Payer: COMMERCIAL

## 2017-04-04 DIAGNOSIS — Z11.59 NEED FOR HEPATITIS C SCREENING TEST: ICD-10-CM

## 2017-04-04 DIAGNOSIS — E66.9 OBESITY (BMI 30-39.9): ICD-10-CM

## 2017-04-04 DIAGNOSIS — E78.5 HYPERLIPIDEMIA, UNSPECIFIED HYPERLIPIDEMIA TYPE: ICD-10-CM

## 2017-04-04 DIAGNOSIS — M1A.9XX0 CHRONIC GOUT WITHOUT TOPHUS, UNSPECIFIED CAUSE, UNSPECIFIED SITE: ICD-10-CM

## 2017-04-04 DIAGNOSIS — E87.6 HYPOKALEMIA: ICD-10-CM

## 2017-04-04 DIAGNOSIS — E11.9 TYPE 2 DIABETES MELLITUS WITHOUT COMPLICATION, WITHOUT LONG-TERM CURRENT USE OF INSULIN (HCC): ICD-10-CM

## 2017-04-04 DIAGNOSIS — E55.9 VITAMIN D DEFICIENCY: ICD-10-CM

## 2017-04-04 DIAGNOSIS — I10 ESSENTIAL HYPERTENSION: ICD-10-CM

## 2017-04-04 LAB
ALBUMIN SERPL BCP-MCNC: 3.9 G/DL (ref 3.4–5)
ALBUMIN/GLOB SERPL: 1.4 {RATIO} (ref 0.8–1.7)
ALP SERPL-CCNC: 39 U/L (ref 45–117)
ALT SERPL-CCNC: 29 U/L (ref 13–56)
ANION GAP BLD CALC-SCNC: 11 MMOL/L (ref 3–18)
APPEARANCE UR: CLEAR
AST SERPL W P-5'-P-CCNC: 18 U/L (ref 15–37)
BACTERIA URNS QL MICRO: ABNORMAL /HPF
BASOPHILS # BLD AUTO: 0.1 K/UL (ref 0–0.06)
BASOPHILS # BLD: 2 % (ref 0–2)
BILIRUB SERPL-MCNC: 0.4 MG/DL (ref 0.2–1)
BILIRUB UR QL: NEGATIVE
BUN SERPL-MCNC: 21 MG/DL (ref 7–18)
BUN/CREAT SERPL: 20 (ref 12–20)
CALCIUM SERPL-MCNC: 9.4 MG/DL (ref 8.5–10.1)
CHLORIDE SERPL-SCNC: 102 MMOL/L (ref 100–108)
CHOLEST SERPL-MCNC: 154 MG/DL
CO2 SERPL-SCNC: 26 MMOL/L (ref 21–32)
COLOR UR: YELLOW
CREAT SERPL-MCNC: 1.03 MG/DL (ref 0.6–1.3)
DIFFERENTIAL METHOD BLD: ABNORMAL
EOSINOPHIL # BLD: 0.2 K/UL (ref 0–0.4)
EOSINOPHIL NFR BLD: 4 % (ref 0–5)
EPITH CASTS URNS QL MICRO: ABNORMAL /LPF (ref 0–5)
ERYTHROCYTE [DISTWIDTH] IN BLOOD BY AUTOMATED COUNT: 14.6 % (ref 11.6–14.5)
EST. AVERAGE GLUCOSE BLD GHB EST-MCNC: 171 MG/DL
GLOBULIN SER CALC-MCNC: 2.8 G/DL (ref 2–4)
GLUCOSE SERPL-MCNC: 152 MG/DL (ref 74–99)
GLUCOSE UR STRIP.AUTO-MCNC: NEGATIVE MG/DL
HBA1C MFR BLD: 7.6 % (ref 4.2–5.6)
HCT VFR BLD AUTO: 36.4 % (ref 35–45)
HDLC SERPL-MCNC: 51 MG/DL (ref 40–60)
HDLC SERPL: 3 {RATIO} (ref 0–5)
HGB BLD-MCNC: 10.9 G/DL (ref 12–16)
HGB UR QL STRIP: NEGATIVE
KETONES UR QL STRIP.AUTO: NEGATIVE MG/DL
LDLC SERPL CALC-MCNC: 75.2 MG/DL (ref 0–100)
LEUKOCYTE ESTERASE UR QL STRIP.AUTO: NEGATIVE
LIPID PROFILE,FLP: NORMAL
LYMPHOCYTES # BLD AUTO: 41 % (ref 21–52)
LYMPHOCYTES # BLD: 2.2 K/UL (ref 0.9–3.6)
MCH RBC QN AUTO: 26.8 PG (ref 24–34)
MCHC RBC AUTO-ENTMCNC: 29.9 G/DL (ref 31–37)
MCV RBC AUTO: 89.7 FL (ref 74–97)
MONOCYTES # BLD: 0.3 K/UL (ref 0.05–1.2)
MONOCYTES NFR BLD AUTO: 6 % (ref 3–10)
NEUTS SEG # BLD: 2.5 K/UL (ref 1.8–8)
NEUTS SEG NFR BLD AUTO: 47 % (ref 40–73)
NITRITE UR QL STRIP.AUTO: NEGATIVE
PH UR STRIP: 8.5 [PH] (ref 5–8)
PLATELET # BLD AUTO: 220 K/UL (ref 135–420)
PMV BLD AUTO: 10.5 FL (ref 9.2–11.8)
POTASSIUM SERPL-SCNC: 4.3 MMOL/L (ref 3.5–5.5)
PROT SERPL-MCNC: 6.7 G/DL (ref 6.4–8.2)
PROT UR STRIP-MCNC: NEGATIVE MG/DL
RBC # BLD AUTO: 4.06 M/UL (ref 4.2–5.3)
RBC #/AREA URNS HPF: 0 /HPF (ref 0–5)
SODIUM SERPL-SCNC: 139 MMOL/L (ref 136–145)
SP GR UR REFRACTOMETRY: 1.02 (ref 1–1.03)
TRIGL SERPL-MCNC: 139 MG/DL (ref ?–150)
TSH SERPL DL<=0.05 MIU/L-ACNC: 3.63 UIU/ML (ref 0.36–3.74)
URATE SERPL-MCNC: 4.9 MG/DL (ref 2.6–7.2)
UROBILINOGEN UR QL STRIP.AUTO: 0.2 EU/DL (ref 0.2–1)
VLDLC SERPL CALC-MCNC: 27.8 MG/DL
WBC # BLD AUTO: 5.3 K/UL (ref 4.6–13.2)
WBC URNS QL MICRO: ABNORMAL /HPF (ref 0–4)

## 2017-04-04 PROCEDURE — 81001 URINALYSIS AUTO W/SCOPE: CPT | Performed by: FAMILY MEDICINE

## 2017-04-04 PROCEDURE — 82306 VITAMIN D 25 HYDROXY: CPT | Performed by: FAMILY MEDICINE

## 2017-04-04 PROCEDURE — 86803 HEPATITIS C AB TEST: CPT | Performed by: FAMILY MEDICINE

## 2017-04-04 PROCEDURE — 80053 COMPREHEN METABOLIC PANEL: CPT | Performed by: FAMILY MEDICINE

## 2017-04-04 PROCEDURE — 36415 COLL VENOUS BLD VENIPUNCTURE: CPT | Performed by: FAMILY MEDICINE

## 2017-04-04 PROCEDURE — 80061 LIPID PANEL: CPT | Performed by: FAMILY MEDICINE

## 2017-04-04 PROCEDURE — 83036 HEMOGLOBIN GLYCOSYLATED A1C: CPT | Performed by: FAMILY MEDICINE

## 2017-04-04 PROCEDURE — 85025 COMPLETE CBC W/AUTO DIFF WBC: CPT | Performed by: FAMILY MEDICINE

## 2017-04-04 PROCEDURE — 84443 ASSAY THYROID STIM HORMONE: CPT | Performed by: FAMILY MEDICINE

## 2017-04-04 PROCEDURE — 84550 ASSAY OF BLOOD/URIC ACID: CPT | Performed by: FAMILY MEDICINE

## 2017-04-05 LAB
25(OH)D3 SERPL-MCNC: 36.9 NG/ML (ref 30–100)
HCV AB SER IA-ACNC: 0.09 INDEX
HCV AB SERPL QL IA: NEGATIVE
HCV COMMENT,HCGAC: NORMAL

## 2017-04-19 ENCOUNTER — OFFICE VISIT (OUTPATIENT)
Dept: INTERNAL MEDICINE CLINIC | Age: 67
End: 2017-04-19

## 2017-04-19 VITALS
OXYGEN SATURATION: 97 % | TEMPERATURE: 97.7 F | HEIGHT: 65 IN | BODY MASS INDEX: 33.99 KG/M2 | RESPIRATION RATE: 16 BRPM | WEIGHT: 204 LBS | DIASTOLIC BLOOD PRESSURE: 67 MMHG | HEART RATE: 71 BPM | SYSTOLIC BLOOD PRESSURE: 135 MMHG

## 2017-04-19 DIAGNOSIS — M1A.9XX0 CHRONIC GOUT WITHOUT TOPHUS, UNSPECIFIED CAUSE, UNSPECIFIED SITE: ICD-10-CM

## 2017-04-19 DIAGNOSIS — E55.9 VITAMIN D DEFICIENCY: ICD-10-CM

## 2017-04-19 DIAGNOSIS — E04.1 LEFT THYROID NODULE: ICD-10-CM

## 2017-04-19 DIAGNOSIS — D64.9 ANEMIA, UNSPECIFIED TYPE: ICD-10-CM

## 2017-04-19 DIAGNOSIS — I10 ESSENTIAL HYPERTENSION: ICD-10-CM

## 2017-04-19 DIAGNOSIS — E78.5 HYPERLIPIDEMIA, UNSPECIFIED HYPERLIPIDEMIA TYPE: ICD-10-CM

## 2017-04-19 DIAGNOSIS — K21.9 GASTROESOPHAGEAL REFLUX DISEASE WITHOUT ESOPHAGITIS: ICD-10-CM

## 2017-04-19 DIAGNOSIS — Z23 ENCOUNTER FOR IMMUNIZATION: ICD-10-CM

## 2017-04-19 DIAGNOSIS — E11.9 TYPE 2 DIABETES MELLITUS WITHOUT COMPLICATION, WITHOUT LONG-TERM CURRENT USE OF INSULIN (HCC): Primary | ICD-10-CM

## 2017-04-19 RX ORDER — LANOLIN ALCOHOL/MO/W.PET/CERES
325 CREAM (GRAM) TOPICAL
Qty: 90 TAB | Refills: 1 | Status: SHIPPED | OUTPATIENT
Start: 2017-04-19 | End: 2017-10-06 | Stop reason: SDUPTHER

## 2017-04-19 RX ORDER — VALSARTAN AND HYDROCHLOROTHIAZIDE 160; 25 MG/1; MG/1
TABLET ORAL
Qty: 90 TAB | Refills: 1 | Status: SHIPPED | OUTPATIENT
Start: 2017-04-19 | End: 2017-04-19 | Stop reason: SDUPTHER

## 2017-04-19 RX ORDER — VALSARTAN AND HYDROCHLOROTHIAZIDE 160; 25 MG/1; MG/1
TABLET ORAL
Qty: 90 TAB | Refills: 1 | Status: SHIPPED | OUTPATIENT
Start: 2017-04-19 | End: 2018-07-05 | Stop reason: SDUPTHER

## 2017-04-19 NOTE — PROGRESS NOTES
Pt is here for 6 month  follow up. HTN, GERD, Cholesterol, Vit D             Labs done    Do you have an advance directive yes    Pt mychart activation pending. 1. Have you been to the ER, urgent care clinic since your last visit? Hospitalized since your last visit? No    2. Have you seen or consulted any other health care providers outside of the [address] [place] [address] since your last visit? Include any pap smears or colon screening. Yes Dr Lukas Ruelas, [facility]Op[facility] 3/17      Pt given Pneumococcal 13 vaccine given in L deltoid per verbral readback order Dr Teresa Barba. Pt tolerated procedure w/o reaction.

## 2017-04-19 NOTE — PATIENT INSTRUCTIONS
Hemoglobin A1c: About This Test  What is it? Hemoglobin A1c is a blood test that checks your average blood sugar level over the past 2 to 3 months. This test also is called a glycohemoglobin test or an A1c test.  Why is this test done? The A1c test is done to check how well your diabetes has been controlled over the past 2 to 3 months. Your doctor can use this information to adjust your medicine and diabetes treatment, if needed. How can you prepare for the test?  You do not need to stop eating before you have an A1c test. This test can be done at any time during the day, even after a meal.  What happens during the test?  The health professional taking a sample of your blood will:  · Wrap an elastic band around your upper arm. This makes the veins below the band larger so it is easier to put a needle into the vein. · Clean the needle site with alcohol. · Put the needle into the vein. · Attach a tube to the needle to fill it with blood. · Remove the band from your arm when enough blood is collected. · Put a gauze pad or cotton ball over the needle site as the needle is removed. · Put pressure on the site and then put on a bandage. What else should you know about the test?  The test result is usually given as a percentage. The normal A1c is less than 5.7%. The A1c test result also can be used to find your estimated average glucose, or eAG. Your eAG and A1c show the same thing in two different ways. They both help you learn more about your average blood sugar range over the past 2 to 3 months. Where can you learn more? Go to http://sravan-arcadio.info/. Enter U216 in the search box to learn more about \"Hemoglobin A1c: About This Test.\"  Current as of: May 23, 2016  Content Version: 11.2  © 6363-5182 [employer]. Care instructions adapted under license by [employer] (which disclaims liability or warranty for this information).  If you have questions about a medical condition or this instruction, always ask your healthcare professional. [address] any warranty or liability for your use of this information. Vaccine Information Statement     Pneumococcal Conjugate Vaccine (PCV13): What You Need to Know    Many Vaccine Information Statements are available in [place]ian and other languages. See www.immunize.org/vis. Hojas de información Sobre Vacunas están disponibles en español y en muchos otros idiomas. Visite www.immunize.org/vis. 1. Why get vaccinated? Vaccination can protect both children and adults from pneumococcal disease. Pneumococcal disease is caused by bacteria that can spread from person to person through close contact. It can cause ear infections, and it can also lead to more serious infections of the:   Lungs (pneumonia),   Blood (bacteremia), and   Covering of the brain and spinal cord (meningitis). Pneumococcal pneumonia is most common among adults. Pneumococcal meningitis can cause deafness and brain damage, and it kills about 1 child in 10 who get it. Anyone can get pneumococcal disease, but children under 3years of age and adults 72 years and older, people with certain medical conditions, and cigarette smokers are at the highest risk. Before there was a vaccine, the [facility] saw:   more than 700 cases of meningitis,   about 13,000 blood infections,   about 5 million ear infections, and   about 200 deaths  in children under 5 each year from pneumococcal disease. Since vaccine became available, severe pneumococcal disease in these children has fallen by 88%. About 18,000 older adults die of pneumococcal disease each year in the [place]. Treatment of pneumococcal infections with penicillin and other drugs is not as effective as it used to be, because some strains of the disease have become resistant to these drugs.  This makes prevention of the disease, through vaccination, even more important. 2. PCV13 vaccine    Pneumococcal conjugate vaccine (called PCV13) protects against 13 types of pneumococcal bacteria. PCV13 is routinely given to children at 2, 4, 6, and 1515 months of age. It is also recommended for children and adults 3to 59years of age with certain health conditions, and for all adults 72years of age and older. Your doctor can give you details. 3. Some people should not get this vaccine    Anyone who has ever had a life-threatening allergic reaction to a dose of this vaccine, to an earlier pneumococcal vaccine called PCV7, or to any vaccine containing diphtheria toxoid (for example, DTaP), should not get PCV13. Anyone with a severe allergy to any component of PCV13 should not get the vaccine. Tell your doctor if the person being vaccinated has any severe allergies. If the person scheduled for vaccination is not feeling well, your healthcare provider might decide to reschedule the shot on another day. 4. Risks of a vaccine reaction    With any medicine, including vaccines, there is a chance of reactions. These are usually mild and go away on their own, but serious reactions are also possible. Problems reported following PCV13 varied by age and dose in the series. The most common problems reported among children were:    About half became drowsy after the shot, had a temporary loss of appetite, or had redness or tenderness where the shot was given.  About 1 out of 3 had swelling where the shot was given.  About 1 out of 3 had a mild fever, and about 1 in 20 had a fever over 102.2°F.   Up to about 8 out of 10 became fussy or irritable. Adults have reported pain, redness, and swelling where the shot was given; also mild fever, fatigue, headache, chills, or muscle pain. Claudie Meckel children who get PCV13 along with inactivated flu vaccine at the same time may be at increased risk for seizures caused by fever. Ask your doctor for more information. Problems that could happen after any vaccine:     People sometimes faint after a medical procedure, including vaccination. Sitting or lying down for about 15 minutes can help prevent fainting, and injuries caused by a fall. Tell your doctor if you feel dizzy, or have vision changes or ringing in the ears.  Some older children and adults get severe pain in the shoulder and have difficulty moving the arm where a shot was given. This happens very rarely.  Any medication can cause a severe allergic reaction. Such reactions from a vaccine are very rare, estimated at about 1 in a million doses, and would happen within a few minutes to a few hours after the vaccination. As with any medicine, there is a very small chance of a vaccine causing a serious injury or death. The safety of vaccines is always being monitored. For more information, visit: www.cdc.gov/vaccinesafety/     5. What if there is a serious reaction? What should I look for?  Look for anything that concerns you, such as signs of a severe allergic reaction, very high fever, or unusual behavior. Signs of a severe allergic reaction can include hives, swelling of the face and throat, difficulty breathing, a fast heartbeat, dizziness, and weakness  usually within a few minutes to a few hours after the vaccination. What should I do?  If you think it is a severe allergic reaction or other emergency that cant wait, call 9-1-1 or get the person to the nearest hospital. Otherwise, call your doctor. Reactions should be reported to the Vaccine Adverse Event Reporting System (VAERS). Your doctor should file this report, or you can do it yourself through the VAERS web site at www.vaers. hhs.gov, or by calling 0-555.167.2819. VAERS does not give medical advice.     6. The National Vaccine Injury Compensation Program    The National Vaccine Injury Compensation Program (VICP) is a federal program that was created to compensate people who may have been injured by certain vaccines. Persons who believe they may have been injured by a vaccine can learn about the program and about filing a claim by calling 5-371.203.2219 or visiting the [facility] [employer] website at www.[facility].gov/vaccinecompensation. There is a time limit to file a claim for compensation. 7. How can I learn more?  Ask your healthcare provider. He or she can give you the vaccine package insert or suggest other sources of information.  Call your local or state health department.  Contact the Centers for Disease Control and Prevention (CDC):  - Call 0-556.580.2696 (1-800-CDC-INFO) or  - Visit CDCs website at www.cdc.gov/vaccines    Vaccine Information Statement   PCV13 Vaccine   11/5/2015   41 TISH Cross [id number]    Department of Health and Human Services  Centers for Disease Control and Prevention    Office Use Only

## 2017-04-19 NOTE — MR AVS SNAPSHOT
Visit Information Date & Time Provider Department Dept. Phone Encounter #  
 4/19/2017  1:30 PM Kale Gonzalez DO [facility] [employer] [phone] Follow-up Instructions Return for 4 month follow up w/ labs. Upcoming Health Maintenance Date Due DTaP/Tdap/Td series (1 - Tdap) 10/9/2008 ZOSTER VACCINE AGE 60> 6/2/2009 Pneumococcal 65+ Low/Medium Risk (2 of 2 - PCV13) 6/11/2015 EYE EXAM RETINAL OR DILATED Q1 6/27/2017 FOOT EXAM Q1 7/1/2017 HEMOGLOBIN A1C Q6M 10/4/2017 MICROALBUMIN Q1 10/4/2017 MEDICARE YEARLY EXAM 10/12/2017 LIPID PANEL Q1 4/4/2018 GLAUCOMA SCREENING Q2Y 6/27/2018 BREAST CANCER SCRN MAMMOGRAM 8/25/2018 COLONOSCOPY 7/23/2019 Allergies as of 4/19/2017  Review Complete On: 4/19/2017 By: Kale Gonzalez DO Severity Noted Reaction Type Reactions Sulfa (Sulfonamide Antibiotics)  06/22/2010    Other (comments)  
 nightmares Current Immunizations  Reviewed on 10/13/2014 Name Date Influenza Vaccine 10/13/2014, 12/16/2013, 12/24/2012 Influenza Vaccine (Quad) PF 10/11/2016, 12/1/2015  2:30 PM  
 Influenza Vaccine Split 11/30/2011 Pneumococcal Conjugate (PCV-13)  Incomplete Pneumococcal Polysaccharide (PPSV-23) 6/11/2014 TD Vaccine 10/8/2008 Not reviewed this visit You Were Diagnosed With   
  
 Codes Comments Type 2 diabetes mellitus without complication, without long-term current use of insulin (HCC)    -  Primary ICD-10-CM: E11.9 ICD-9-CM: 250.00 Hyperlipidemia, unspecified hyperlipidemia type     ICD-10-CM: E78.5 ICD-9-CM: 272.4 Gastroesophageal reflux disease without esophagitis     ICD-10-CM: K21.9 ICD-9-CM: 530.81 Chronic gout without tophus, unspecified cause, unspecified site     ICD-10-CM: M1A. 9XX0 
ICD-9-CM: 274.02 Vitamin D deficiency     ICD-10-CM: E55.9 ICD-9-CM: 268.9 Essential hypertension     ICD-10-CM: I10 
ICD-9-CM: 401.9 Anemia, unspecified type     ICD-10-CM: D64.9 ICD-9-CM: 285.9 Encounter for immunization     ICD-10-CM: G20 ICD-9-CM: V03.89 Vitals BP Pulse Temp Resp Height(growth percentile) Weight(growth percentile) 135/67 71 97.7 °F (36.5 °C) (Oral) 16 5' 5\" (1.651 m) 204 lb (92.5 kg) SpO2 BMI OB Status Smoking Status 97% 33.95 kg/m2 Hysterectomy Never Smoker Vitals History BMI and BSA Data Body Mass Index Body Surface Area 33.95 kg/m 2 2.06 m 2 Preferred Pharmacy Pharmacy Name [facility] [address] [place] [address] [phone] Your Updated Medication List  
  
   
This list is accurate as of: 4/19/17  2:31 PM.  Always use your most recent med list.  
  
  
  
  
 allopurinol 300 mg tablet Commonly known as:  Cleatus Comp Take 1 Tab by mouth daily. aspirin delayed-release 81 mg tablet Take  by mouth daily. atorvastatin 80 mg tablet Commonly known as:  LIPITOR Take 1 Tab by mouth daily. cholecalciferol 5,000 unit capsule Commonly known as:  VITAMIN D3 Take 5,000 Units by mouth daily. cyanocobalamin ER 1,000 mcg tablet Commonly known as:  VITAMIN B-12 Take 1 tablet by mouth daily. docusate sodium 100 mg capsule Commonly known as:  [place] Take 1 capsule three times per week as needed for constipation  
  
 fenofibrate nanocrystallized 145 mg tablet Commonly known as:  [employer] Take 1 Tab by mouth daily. ferrous sulfate 325 mg (65 mg iron) tablet Take 1 Tab by mouth Daily (before breakfast). metFORMIN 1,000 mg tablet Commonly known as:  GLUCOPHAGE Take 1 Tab by mouth two (2) times daily (with meals). potassium chloride 20 mEq tablet Commonly known as:  K-DUR, KLOR-CON Take 1 Tab by mouth daily. PriLOSEC OTC 20 mg tablet Generic drug:  omeprazole Take 20 mg by mouth daily. valsartan-hydroCHLOROthiazide 160-25 mg per tablet Commonly known as:  DIOVAN-HCT  
take 1 tablet by mouth once daily Prescriptions Printed Refills  
 valsartan-hydroCHLOROthiazide (DIOVAN-HCT) 160-25 mg per tablet 1 Sig: take 1 tablet by mouth once daily Class: Print Prescriptions Sent to Pharmacy Refills  
 ferrous sulfate 325 mg (65 mg iron) tablet 1 Sig: Take 1 Tab by mouth Daily (before breakfast). Class: Normal  
 Pharmacy: [facility] [id number] [place] [address], [address] [place] [address] #: [phone] Route: Oral  
  
We Performed the Following PNEUMOCOCCAL CONJ VACCINE 13 VALENT IM [id number] CPT(R)] MN IMMUNIZ ADMIN,1 SINGLE/COMB VAC/TOXOID [id number] CPT(R)] Follow-up Instructions Return for 4 month follow up w/ labs. Patient Instructions Hemoglobin A1c: About This Test 
What is it? Hemoglobin A1c is a blood test that checks your average blood sugar level over the past 2 to 3 months. This test also is called a glycohemoglobin test or an A1c test. 
Why is this test done? The A1c test is done to check how well your diabetes has been controlled over the past 2 to 3 months. Your doctor can use this information to adjust your medicine and diabetes treatment, if needed. How can you prepare for the test? 
You do not need to stop eating before you have an A1c test. This test can be done at any time during the day, even after a meal. 
What happens during the test? 
The health professional taking a sample of your blood will: · Wrap an elastic band around your upper arm. This makes the veins below the band larger so it is easier to put a needle into the vein. · Clean the needle site with alcohol. · Put the needle into the vein. · Attach a tube to the needle to fill it with blood. · Remove the band from your arm when enough blood is collected. · Put a gauze pad or cotton ball over the needle site as the needle is removed. · Put pressure on the site and then put on a bandage. What else should you know about the test? 
The test result is usually given as a percentage. The normal A1c is less than 5.7%. The A1c test result also can be used to find your estimated average glucose, or eAG. Your eAG and A1c show the same thing in two different ways. They both help you learn more about your average blood sugar range over the past 2 to 3 months. Where can you learn more? Go to http://sravan-arcadio.info/. Enter U216 in the search box to learn more about \"Hemoglobin A1c: About This Test.\" Current as of: May 23, 2016 Content Version: 11.2 © 0053-8575 [employer]. Care instructions adapted under license by [employer] (which disclaims liability or warranty for this information). If you have questions about a medical condition or this instruction, always ask your healthcare professional. [address] any warranty or liability for your use of this information. Vaccine Information Statement Pneumococcal Conjugate Vaccine (PCV13): What You Need to Know Many Vaccine Information Statements are available in [place]ic and other languages. See www.immunize.org/vis. Hojas de información Sobre Vacunas están disponibles en español y en muchos otros idiomas. Visite www.immunize.org/vis. 1. Why get vaccinated? Vaccination can protect both children and adults from pneumococcal disease. Pneumococcal disease is caused by bacteria that can spread from person to person through close contact. It can cause ear infections, and it can also lead to more serious infections of the: 
 Lungs (pneumonia),  Blood (bacteremia), and 
 Covering of the brain and spinal cord (meningitis). Pneumococcal pneumonia is most common among adults. Pneumococcal meningitis can cause deafness and brain damage, and it kills about 1 child in 10 who get it.  
 
Anyone can get pneumococcal disease, but children under 3years of age and adults 72 years and older, people with certain medical conditions, and cigarette smokers are at the highest risk. Before there was a vaccine, the [facility] saw: 
 more than 700 cases of meningitis, 
 about 13,000 blood infections, 
 about 5 million ear infections, and 
 about 200 deaths 
in children under 5 each year from pneumococcal disease. Since vaccine became available, severe pneumococcal disease in these children has fallen by 88%. About 18,000 older adults die of pneumococcal disease each year in the [place]. Treatment of pneumococcal infections with penicillin and other drugs is not as effective as it used to be, because some strains of the disease have become resistant to these drugs. This makes prevention of the disease, through vaccination, even more important. 2. PCV13 vaccine Pneumococcal conjugate vaccine (called PCV13) protects against 13 types of pneumococcal bacteria. PCV13 is routinely given to children at 2, 4, 6, and 1515 months of age. It is also recommended for children and adults 3to 59years of age with certain health conditions, and for all adults 72years of age and older. Your doctor can give you details. 3. Some people should not get this vaccine Anyone who has ever had a life-threatening allergic reaction to a dose of this vaccine, to an earlier pneumococcal vaccine called PCV7, or to any vaccine containing diphtheria toxoid (for example, DTaP), should not get PCV13. Anyone with a severe allergy to any component of PCV13 should not get the vaccine. Tell your doctor if the person being vaccinated has any severe allergies. If the person scheduled for vaccination is not feeling well, your healthcare provider might decide to reschedule the shot on another day. 4. Risks of a vaccine reaction With any medicine, including vaccines, there is a chance of reactions.  These are usually mild and go away on their own, but serious reactions are also possible. Problems reported following PCV13 varied by age and dose in the series. The most common problems reported among children were:  About half became drowsy after the shot, had a temporary loss of appetite, or had redness or tenderness where the shot was given.  About 1 out of 3 had swelling where the shot was given.  About 1 out of 3 had a mild fever, and about 1 in 20 had a fever over 102.2°F. 
 Up to about 8 out of 10 became fussy or irritable. Adults have reported pain, redness, and swelling where the shot was given; also mild fever, fatigue, headache, chills, or muscle pain. Yeyo Bae children who get PCV13 along with inactivated flu vaccine at the same time may be at increased risk for seizures caused by fever. Ask your doctor for more information. Problems that could happen after any vaccine:  People sometimes faint after a medical procedure, including vaccination. Sitting or lying down for about 15 minutes can help prevent fainting, and injuries caused by a fall. Tell your doctor if you feel dizzy, or have vision changes or ringing in the ears.  Some older children and adults get severe pain in the shoulder and have difficulty moving the arm where a shot was given. This happens very rarely.  Any medication can cause a severe allergic reaction. Such reactions from a vaccine are very rare, estimated at about 1 in a million doses, and would happen within a few minutes to a few hours after the vaccination. As with any medicine, there is a very small chance of a vaccine causing a serious injury or death. The safety of vaccines is always being monitored. For more information, visit: www.cdc.gov/vaccinesafety/  
 
5. What if there is a serious reaction? What should I look for?  Look for anything that concerns you, such as signs of a severe allergic reaction, very high fever, or unusual behavior. Signs of a severe allergic reaction can include hives, swelling of the face and throat, difficulty breathing, a fast heartbeat, dizziness, and weakness  usually within a few minutes to a few hours after the vaccination. What should I do?  If you think it is a severe allergic reaction or other emergency that cant wait, call 9-1-1 or get the person to the nearest hospital. Otherwise, call your doctor. Reactions should be reported to the Vaccine Adverse Event Reporting System (VAERS). Your doctor should file this report, or you can do it yourself through the VAERS web site at www.vaers. [facility].gov, or by calling 0-501.627.3222. VAERS does not give medical advice. 6. The National Vaccine Injury Compensation Program 
 
The [facility] Vaccine Injury Compensation Program (VICP) is a federal program that was created to compensate people who may have been injured by certain vaccines. Persons who believe they may have been injured by a vaccine can learn about the program and about filing a claim by calling 8-516.533.8029 or visiting the [facility] website at www.[facility].gov/vaccinecompensation. There is a time limit to file a claim for compensation. 7. How can I learn more?  Ask your healthcare provider. He or she can give you the vaccine package insert or suggest other sources of information.  Call your local or state health department.  Contact the Centers for Disease Control and Prevention (CDC): 
- Call 7-816.560.4504 (1-800-CDC-INFO) or 
- Visit CDCs website at www.cdc.gov/vaccines Vaccine Information Statement PCV13 Vaccine 11/5/2015  
41 TISH Rojas [id number] Department of Health and "[employer]" Centers for Disease Control and Prevention Office Use Only Introducing [facility]! Heena Sandra introduces [employer] patient portal. Now you can access parts of your medical record, email your doctor's office, and request medication refills online.    
 
1. In your internet browser, go to [email or web address] 2. Click on the First Time User? Click Here link in the Sign In box. You will see the New Member Sign Up page. 3. Enter your H[employer]suite Access Code exactly as it appears below. You will not need to use this code after youve completed the sign-up process. If you do not sign up before the expiration date, you must request a new code. · [employer]suite Access Code: [email or web address] Expires: 4/20/2017  3:02 PM 
 
4. Enter the last four digits of your Social Security Number (xxxx) and Date of Birth (mm/dd/yyyy) as indicated and click Submit. You will be taken to the next sign-up page. 5. Create a FindTheBestt ID. This will be your Hootsuite login ID and cannot be changed, so think of one that is secure and easy to remember. 6. Create a [employer] password. You can change your password at any time. 7. Enter your Password Reset Question and Answer. This can be used at a later time if you forget your password. 8. Enter your e-mail address. You will receive e-mail notification when new information is available in [address] 9. Click Sign Up. You can now view and download portions of your medical record. 10. Click the Download Summary menu link to download a portable copy of your medical information. If you have questions, please visit the Frequently Asked Questions section of the [employer] website. Remember, [employer]suite is NOT to be used for urgent needs. For medical emergencies, dial 911. Now available from your iPhone and Android! Please provide this summary of care documentation to your next provider. Your primary care clinician is listed as Ryder Ronquillo. If you have any questions after today's visit, please call [phone].

## 2017-04-29 NOTE — PROGRESS NOTES
HISTORY OF PRESENT ILLNESS  June L Cheryle Maudlin is a 78 y.o. female. HPI Comments: 78year old established female patient in today for 6 month follow up on HTN, GERD, anemia, DM, gout, HLD and Vit D deficiency. Patient denies interim illness or fhx changes. Patient has not been taking the colace and iron every other day as she was having issues with her bowels (leakage). She would ultimately like to d/c both medications    She is not exercising    She is taking her other medications with no adverse side effects, she is requesting refills today. She denies CP, SOB, dyspnea, edema,  or myalgias. Allergies   Allergen Reactions    Sulfa (Sulfonamide Antibiotics) Other (comments)     nightmares       Past Medical History:   Diagnosis Date    Arthritis     Candida intertrigo 7/11/2011    Diabetes ([facility] [address]     GERD (gastroesophageal reflux disease)     Gout     Hypercholesterolemia     Hypertension        Family History   Problem Relation Age of Onset    Hypertension Father     Heart Disease Father     Hypertension Brother     Diabetes Brother     Heart Disease Mother     Breast Cancer Other        Social History   Substance Use Topics    Smoking status: Never Smoker    Smokeless tobacco: Never Used    Alcohol use No        Current Outpatient Prescriptions   Medication Sig    ferrous sulfate 325 mg (65 mg iron) tablet Take 1 Tab by mouth Daily (before breakfast).  valsartan-hydroCHLOROthiazide (DIOVAN-HCT) 160-25 mg per tablet take 1 tablet by mouth once daily    allopurinol (ZYLOPRIM) 300 mg tablet Take 1 Tab by mouth daily.  potassium chloride (K-DUR, KLOR-CON) 20 mEq tablet Take 1 Tab by mouth daily.  atorvastatin (LIPITOR) 80 mg tablet Take 1 Tab by mouth daily.  metFORMIN (GLUCOPHAGE) 1,000 mg tablet Take 1 Tab by mouth two (2) times daily (with meals).  fenofibrate nanocrystallized (TRICOR) 145 mg tablet Take 1 Tab by mouth daily.     docusate sodium (COLACE) 100 mg capsule Take 1 capsule three times per week as needed for constipation    aspirin delayed-release 81 mg tablet Take  by mouth daily.  cyanocobalamin ER (VITAMIN B-12) 1,000 mcg tablet Take 1 tablet by mouth daily.  cholecalciferol (VITAMIN D3) 5,000 unit capsule Take 5,000 Units by mouth daily.  omeprazole (PRILOSEC OTC) 20 mg tablet Take 20 mg by mouth daily. No current facility-administered medications for this visit. Past Surgical History:   Procedure Laterality Date    HX BREAST BIOPSY      Calcium deposits    HX DILATION AND CURETTAGE      HX HEENT  07/2016    cataract surgery bilaterally    HX HYSTERECTOMY      HX KNEE REPLACEMENT      Right    HX ORTHOPAEDIC      HX RADHA AND BSO      HX UROLOGICAL  1998; 1999    Bladder surgery    CA COLONOSCOPY FLX DX W/COLLJ SPEC WHEN PFRMD  7-23-09    normal; Dr.Duntemann MARQUIS   Constitutional: Negative for fever and chills. HENT: Negative for tinnitus. Eyes: Negative for blurred vision and double vision. Respiratory: Negative for cough and shortness of breath. Cardiovascular: Negative for chest pain    Gastrointestinal: Negative for nausea, vomiting and diarrhea. Genitourinary: Negative for dysuria and flank pain. Musculoskeletal: Negative for myalgias and joint pain. Neurological: Negative for tremors, sensory change, speech change, focal weakness and headaches. Psychiatric/Behavioral: Negative for depression and suicidal ideas. Visit Vitals    /67    Pulse 71    Temp 97.7 °F (36.5 °C) (Oral)    Resp 16    Ht 5' 5\" (1.651 m)    Wt 204 lb (92.5 kg)    SpO2 97%    BMI 33.95 kg/m2     Physical Exam  Constitutional: Obese habitus. She is oriented to person, place, and time and in no distress. Head: Normocephalic and atraumatic. Right Ear: External ear normal.   Left Ear: External ear normal.   Eyes: Pupils are equal, round, and reactive to light. Neck: Normal range of motion.    Cardiovascular: Normal rate, regular rhythm, normal heart sounds and intact distal pulses. No murmur heard. Pulmonary/Chest: Effort normal and breath sounds normal. No respiratory distress. Musculoskeletal: Normal range of motion. She exhibits no edema. Neurological: She is alert and oriented to person, place, and time. Gait normal.   Skin: Skin is warm and dry. Psychiatric: Mood, memory, affect and judgment normal.     Lab Results   Component Value Date/Time    WBC 5.3 04/04/2017 07:56 AM    HGB 10.9 04/04/2017 07:56 AM    HCT 36.4 04/04/2017 07:56 AM    PLATELET 736 29/31/2516 07:56 AM    MCV 89.7 04/04/2017 07:56 AM     Lab Results   Component Value Date/Time    Sodium 139 04/04/2017 07:56 AM    Potassium 4.3 04/04/2017 07:56 AM    Chloride 102 04/04/2017 07:56 AM    CO2 26 04/04/2017 07:56 AM    Anion gap 11 04/04/2017 07:56 AM    Glucose 152 04/04/2017 07:56 AM    BUN 21 04/04/2017 07:56 AM    Creatinine 1.03 04/04/2017 07:56 AM    BUN/Creatinine ratio 20 04/04/2017 07:56 AM    GFR est AA >60 04/04/2017 07:56 AM    GFR est non-AA 53 04/04/2017 07:56 AM    Calcium 9.4 04/04/2017 07:56 AM    Bilirubin, total 0.4 04/04/2017 07:56 AM    AST (SGOT) 18 04/04/2017 07:56 AM    Alk.  phosphatase 39 04/04/2017 07:56 AM    Protein, total 6.7 04/04/2017 07:56 AM    Albumin 3.9 04/04/2017 07:56 AM    Globulin 2.8 04/04/2017 07:56 AM    A-G Ratio 1.4 04/04/2017 07:56 AM    ALT (SGPT) 29 04/04/2017 07:56 AM     Lab Results   Component Value Date/Time    Cholesterol, total 154 04/04/2017 07:56 AM    HDL Cholesterol 51 04/04/2017 07:56 AM    LDL, calculated 75.2 04/04/2017 07:56 AM    VLDL, calculated 27.8 04/04/2017 07:56 AM    Triglyceride 139 04/04/2017 07:56 AM    CHOL/HDL Ratio 3.0 04/04/2017 07:56 AM     Lab Results   Component Value Date/Time    Hemoglobin A1c 7.6 04/04/2017 07:56 AM     Lab Results   Component Value Date/Time    TSH 3.63 04/04/2017 07:56 AM    T4, Free 1.3 10/04/2016 07:43 AM     Lab Results   Component Value Date/Time Vitamin D 25-Hydroxy 36.9 04/04/2017 07:56 AM       ASSESSMENT and PLAN    ICD-10-CM ICD-9-CM    1. Type 2 diabetes mellitus without complication, without long-term current use of insulin (HCC) E11.9 250.00 HEMOGLOBIN A1C WITH EAG   2. Hyperlipidemia, unspecified hyperlipidemia type E78.5 272.4 LIPID PANEL   3. Gastroesophageal reflux disease without esophagitis K21.9 530.81 CBC WITH AUTOMATED DIFF      METABOLIC PANEL, COMPREHENSIVE   4. Chronic gout without tophus, unspecified cause, unspecified site M1A. 9XX0 274.02    5. Vitamin D deficiency E55.9 268.9 VITAMIN D, 25 HYDROXY   6. Essential hypertension I10 401.9 valsartan-hydroCHLOROthiazide (DIOVAN-HCT) 160-25 mg per tablet      CBC WITH AUTOMATED DIFF      METABOLIC PANEL, COMPREHENSIVE      DISCONTINUED: valsartan-hydroCHLOROthiazide (DIOVAN-HCT) 160-25 mg per tablet   7. Anemia, unspecified type D64.9 285.9 ferrous sulfate 325 mg (65 mg iron) tablet      CBC WITH AUTOMATED DIFF   8. Encounter for immunization Z23 V03.89 PNEUMOCOCCAL CONJ VACCINE 13 VALENT IM      MD IMMUNIZ ADMIN,1 SINGLE/COMB VAC/TOXOID   9. Left thyroid nodule E04.1 241.0 TSH 3RD GENERATION     -Labs reviewed in detail with patient. Mild iron deficiency anemia, patient will replete as directed  -Hypokalemia stable on potassium repletion. Med refilled today  -HLD-Stable on lipitor and tricor, ctn current regimen  -HTN stable on Diovan-HCT, ctn  -GERD stable on prilosec, ctn  -DM stable at 7.6  -Vit D deficiency stable  -Thyroid nodule appears to be stable from most recent thyroid U/S and lab review will discuss plan for Endo evaluation with patient at next visit.   -Anemia stable on iron supplementation  -RTC 4 month follow up w/ labs      Patient classified as obese  Encouraged diet and exercise  According to the CDC an increased BMI can lead to \"all-causes of death (mortality), High blood pressure (Hypertension), High LDL cholesterol, low HDL cholesterol, or high levels of triglycerides (Dyslipidemia), Type 2 diabetes, Coronary heart disease, Stroke, Gallbladder disease, Osteoarthritis (a breakdown of cartilage and bone within a joint), Sleep apnea and breathing problems, Chronic inflammation and increased oxidative stress, some cancers (endometrial, breast, colon, kidney, gallbladder, and liver), Low quality of life, Mental illness such as clinical depression, anxiety, and other mental disorders and Body pain and difficulty with physical functioning. \"    BMI Weight Status   Below 18.5 Underweight   18.5  24.9 Normal or Healthy Weight   25.0  29.9 Overweight   30.0 and Above Obese   40.0 and Above Morbid Obesity     Follow-up Disposition:   Return if symptoms worsen or fail to improve. Risk and benefits of new medication discussed in detail, patient was given the opportunity to ask questions  AVS provided  reviewed diet, exercise and weight control   Alarm signals discussed. ER precautions   Plan of care reviewed with patient. Understanding verbalized and they are in agreement with plan of care. Additional Instructions: The patient understands that they should contact the office at any time if any questions or concerns develop. They are also aware that they can call our main office number at [phone] at any time if they would like to address any concerns with the physician. They also understand that they should dial 911 if any acute emergency arises. The patient understands that they should give us a minimum of 48 hours to complete prescription refills once they are requested. The patient has also been instructed to contact us by calling the main office number if they have not received feedback within 2 weeks of having any tests completed. The patient is a aware that they should read all package insert information when picking up the medications and that they should consult the pharmacist of a physician if they have any questions or concerns regarding the prescribed medications. Discussed with the patient new medications given and patient instructed to read pharmacy literature regarding side effects and drug interactions. Instructions for taking the medications were provided to the patient and the consequences of not taking it.       Francisco Montemayor, DO

## 2017-06-06 DIAGNOSIS — E78.5 HYPERLIPIDEMIA, UNSPECIFIED HYPERLIPIDEMIA TYPE: ICD-10-CM

## 2017-06-06 RX ORDER — FENOFIBRATE 145 MG/1
145 TABLET, COATED ORAL DAILY
Qty: 90 TAB | Refills: 1 | Status: SHIPPED | OUTPATIENT
Start: 2017-06-06 | End: 2017-12-15 | Stop reason: SDUPTHER

## 2017-07-05 RX ORDER — METFORMIN HYDROCHLORIDE 1000 MG/1
1000 TABLET ORAL 2 TIMES DAILY WITH MEALS
Qty: 180 TAB | Refills: 3 | Status: SHIPPED | OUTPATIENT
Start: 2017-07-05 | End: 2018-07-17 | Stop reason: SDUPTHER

## 2017-07-11 DIAGNOSIS — E78.5 HYPERLIPIDEMIA, UNSPECIFIED HYPERLIPIDEMIA TYPE: ICD-10-CM

## 2017-07-11 RX ORDER — ATORVASTATIN CALCIUM 80 MG/1
80 TABLET, FILM COATED ORAL DAILY
Qty: 90 TAB | Refills: 1 | Status: SHIPPED | OUTPATIENT
Start: 2017-07-11 | End: 2018-01-09 | Stop reason: SDUPTHER

## 2017-07-11 NOTE — TELEPHONE ENCOUNTER
I call Pt in regards to Rx refill on Lipitor. Informed Pt that Rx was refilled and sent to the pharmacy. Pt verbalized understanding.

## 2017-08-22 ENCOUNTER — OFFICE VISIT (OUTPATIENT)
Dept: ORTHOPEDIC SURGERY | Age: 67
End: 2017-08-22

## 2017-08-22 VITALS
TEMPERATURE: 97.9 F | BODY MASS INDEX: 33.66 KG/M2 | HEIGHT: 65 IN | HEART RATE: 67 BPM | OXYGEN SATURATION: 99 % | RESPIRATION RATE: 16 BRPM | DIASTOLIC BLOOD PRESSURE: 57 MMHG | WEIGHT: 202 LBS | SYSTOLIC BLOOD PRESSURE: 125 MMHG

## 2017-08-22 DIAGNOSIS — M25.539 PAIN IN WRIST, UNSPECIFIED LATERALITY: ICD-10-CM

## 2017-08-22 DIAGNOSIS — S66.912A WRIST STRAIN, LEFT, INITIAL ENCOUNTER: Primary | ICD-10-CM

## 2017-08-22 RX ORDER — AMLODIPINE BESYLATE 5 MG/1
5 TABLET ORAL DAILY
COMMUNITY
End: 2017-10-13 | Stop reason: SDUPTHER

## 2017-08-22 RX ORDER — CARVEDILOL 3.12 MG/1
TABLET ORAL 2 TIMES DAILY WITH MEALS
COMMUNITY
End: 2017-10-13 | Stop reason: ALTCHOICE

## 2017-08-22 NOTE — PATIENT INSTRUCTIONS
Joint Pain: Care Instructions  Your Care Instructions  Many people have small aches and pains from overuse or injury to muscles and joints. Joint injuries often happen during sports or recreation, work tasks, or projects around the home. An overuse injury can happen when you put too much stress on a joint or when you do an activity that stresses the joint over and over, such as using the computer or rowing a boat. You can take action at home to help your muscles and joints get better. You should feel better in 1 to 2 weeks, but it can take 3 months or more to heal completely. Follow-up care is a key part of your treatment and safety. Be sure to make and go to all appointments, and call your doctor if you are having problems. It's also a good idea to know your test results and keep a list of the medicines you take. How can you care for yourself at home? · Do not put weight on the injured joint for at least a day or two. · For the first day or two after an injury, do not take hot showers or baths, and do not use hot packs. The heat could make swelling worse. · Put ice or a cold pack on the sore joint for 10 to 20 minutes at a time. Try to do this every 1 to 2 hours for the next 3 days (when you are awake) or until the swelling goes down. Put a thin cloth between the ice and your skin. · Wrap the injury in an elastic bandage. Do not wrap it too tightly because this can cause more swelling. · Prop up the sore joint on a pillow when you ice it or anytime you sit or lie down during the next 3 days. Try to keep it above the level of your heart. This will help reduce swelling. · Take an over-the-counter pain medicine, such as acetaminophen (Tylenol), ibuprofen (Advil, Motrin), or naproxen (Aleve). Read and follow all instructions on the label. · After 1 or 2 days of rest, begin moving the joint gently.  While the joint is still healing, you can begin to exercise using activities that do not strain or hurt the painful joint. When should you call for help? Call your doctor now or seek immediate medical care if:  · You have signs of infection, such as:  ¨ Increased pain, swelling, warmth, and redness. ¨ Red streaks leading from the joint. ¨ A fever. Watch closely for changes in your health, and be sure to contact your doctor if:  · Your movement or symptoms are not getting better after 1 to 2 weeks of home treatment. Where can you learn more? Go to http://sravan-arcadio.info/. Enter P205 in the search box to learn more about \"Joint Pain: Care Instructions. \"  Current as of: March 21, 2017  Content Version: 11.3  © 3363-8817 [email or web address]. Care instructions adapted under license by [employer] (which disclaims liability or warranty for this information). If you have questions about a medical condition or this instruction, always ask your healthcare professional. [address] any warranty or liability for your use of this information.

## 2017-08-22 NOTE — PROGRESS NOTES
Lolly Calles  1950   Chief Complaint   Patient presents with    Wrist Pain     left        HISTORY OF PRESENT ILLNESS  Lolly Calles is a 75 y.o. female who presents today for evaluation of left wrist pain. she rates her pain 4/10 today. She recalls her pain starting in her palm about 3-4 weeks ago when she was pushing herself up off the ground with her left hand. She notes increased pain with picking up objects with the fingers. She notes a dull ache that is constant but there is a sharp pain with certain motions. She has tried wearing a brace but states the heat makes her uncomfortable. She reports she had a mild heart attack 8/17/17. Has tried following treatments: Injections:NO; Brace:YES; Therapy:NO; Cane/Crutch:NO       Allergies   Allergen Reactions    Sulfa (Sulfonamide Antibiotics) Other (comments)     nightmares        Past Medical History:   Diagnosis Date    Arthritis     Candida intertrigo 7/11/2011    Diabetes ([facility] [address]     GERD (gastroesophageal reflux disease)     Gout     Heart attack ([facility] [address] 08/12/2017    mild    Hypercholesterolemia     Hypertension       Social History     Social History    Marital status:      Spouse name: N/A    Number of children: N/A    Years of education: N/A     Occupational History    Not on file.      Social History Main Topics    Smoking status: Never Smoker    Smokeless tobacco: Never Used    Alcohol use No    Drug use: No    Sexual activity: Not on file     Other Topics Concern    Not on file     Social History Narrative      Past Surgical History:   Procedure Laterality Date    HX BREAST BIOPSY      Calcium deposits    HX DILATION AND CURETTAGE      HX HEART CATHETERIZATION  08/17/2017    HX HEENT  07/2016    cataract surgery bilaterally    HX HYSTERECTOMY      HX KNEE REPLACEMENT      Right    HX ORTHOPAEDIC      HX RADHA AND BSO      HX UROLOGICAL  1998; 1999    Bladder surgery    TX COLONOSCOPY FLX DX W/COLLJ SPEC WHEN PFRMD 7-23-09    normal;       Family History   Problem Relation Age of Onset    Hypertension Father     Heart Disease Father     Hypertension Brother     Diabetes Brother     Heart Disease Mother     Breast Cancer Other       Current Outpatient Prescriptions   Medication Sig    carvedilol (COREG) 3.125 mg tablet Take  by mouth two (2) times daily (with meals).  amLODIPine (NORVASC) 5 mg tablet Take 5 mg by mouth daily.  atorvastatin (LIPITOR) 80 mg tablet Take 1 Tab by mouth daily.  metFORMIN (GLUCOPHAGE) 1,000 mg tablet Take 1 Tab by mouth two (2) times daily (with meals).  fenofibrate nanocrystallized (TRICOR) 145 mg tablet Take 1 Tab by mouth daily.  ferrous sulfate 325 mg (65 mg iron) tablet Take 1 Tab by mouth Daily (before breakfast).  valsartan-hydroCHLOROthiazide (DIOVAN-HCT) 160-25 mg per tablet take 1 tablet by mouth once daily    allopurinol (ZYLOPRIM) 300 mg tablet Take 1 Tab by mouth daily.  potassium chloride (K-DUR, KLOR-CON) 20 mEq tablet Take 1 Tab by mouth daily.  docusate sodium (COLACE) 100 mg capsule Take 1 capsule three times per week as needed for constipation    aspirin delayed-release 81 mg tablet Take  by mouth daily.  cyanocobalamin ER (VITAMIN B-12) 1,000 mcg tablet Take 1 tablet by mouth daily.  cholecalciferol (VITAMIN D3) 5,000 unit capsule Take 5,000 Units by mouth daily.  omeprazole (PRILOSEC OTC) 20 mg tablet Take 20 mg by mouth daily. No current facility-administered medications for this visit. REVIEW OF SYSTEM   Patient denies: Weight loss, Fever/Chills, HA, Visual changes, Fatigue, Chest pain, SOB, Abdominal pain, N/V/D/C, Blood in stool or urine, Edema. Pertinent positive as above in HPI.  All others were negative    PHYSICAL EXAM:   Visit Vitals    /57 (BP 1 Location: Left arm, BP Patient Position: Sitting)    Pulse 67    Temp 97.9 °F (36.6 °C) (Oral)    Resp 16    Ht 5' 5\" (1.651 m)    Wt 202 lb (91.6 kg)  SpO2 99%    BMI 33.61 kg/m2     The patient is a well-developed, well-nourished female   in no acute distress. The patient is alert and oriented times three. The patient is alert and oriented times three. Mood and affect are normal.  LYMPHATIC: lymph nodes are not enlarged and are within normal limits  SKIN: normal in color and non tender to palpation. There are no bruises or abrasions noted. NEUROLOGICAL: Motor sensory exam is within normal limits. Reflexes are equal bilaterally. There is normal sensation to pinprick and light touch  MUSCULOSKELETAL:  Examination Left Hand   Skin Intact   Deformity -   Swelling -   Tenderness -   Tenderness A1 Pulley -   Finger flexion Full   Finger extension Full   Thenar Eminence Atrophy -   Sensation Normal   Capillary refill -   Heberden's nodes -   Dupuytren's -     Examination Left Wrist   Skin Intact   Tenderness -   Flexion 60   Extension 60   Deformity -   Effusion -   Tinnel's sign -   Phalen's test -   Finklestein maneuver -   Pain with thumb abduction -          IMAGING: XR of the left wrist dated 8/22/17 was reviewed and read: normal      IMPRESSION:      ICD-10-CM ICD-9-CM    1. Wrist strain, left, initial encounter E64.612Z 842.00    2. Pain in wrist, unspecified laterality M25.539 719.43 AMB POC XRAY, WRIST; COMPLETE, 3+ VIE        PLAN:  1. I feel her pain may be due to a mild left wrist strain subsequent to pushing herself off of the floor. I instructed her to continue to wear the brace as needed. Patient recalls she has diclofenac prescribed to her by French Eason, she may continue to take this. Risk factors include: DM, HTN, GERD, Gout  2. No cortisone injection indicated today   3. No Physical/Occupational Therapy indicated today  4. No diagnostic test indicated today  5. No durable medical equipment indicated today  6. No referral indicated today   7. No medications indicated today  8.  No Narcotic indicated today       RTC PRN  Follow-up Disposition: Not on File    Scribed by Kailyn Conley [address]) as dictated by Melchor Frost MD    I, Dr. Melchor Frost, confirm that all documentation is accurate.     Melchor Frost M.D.   [facility]

## 2017-09-05 ENCOUNTER — HOSPITAL ENCOUNTER (OUTPATIENT)
Dept: LAB | Age: 67
Discharge: HOME | End: 2017-09-05
Payer: COMMERCIAL

## 2017-09-05 DIAGNOSIS — E55.9 VITAMIN D DEFICIENCY: ICD-10-CM

## 2017-09-05 DIAGNOSIS — E78.5 HYPERLIPIDEMIA, UNSPECIFIED HYPERLIPIDEMIA TYPE: ICD-10-CM

## 2017-09-05 DIAGNOSIS — E11.9 TYPE 2 DIABETES MELLITUS WITHOUT COMPLICATION, WITHOUT LONG-TERM CURRENT USE OF INSULIN (HCC): ICD-10-CM

## 2017-09-05 DIAGNOSIS — D64.9 ANEMIA, UNSPECIFIED TYPE: ICD-10-CM

## 2017-09-05 DIAGNOSIS — E04.1 LEFT THYROID NODULE: ICD-10-CM

## 2017-09-05 DIAGNOSIS — K21.9 GASTROESOPHAGEAL REFLUX DISEASE WITHOUT ESOPHAGITIS: ICD-10-CM

## 2017-09-05 DIAGNOSIS — I10 ESSENTIAL HYPERTENSION: ICD-10-CM

## 2017-09-05 LAB
ALBUMIN SERPL-MCNC: 4.1 G/DL (ref 3.4–5)
ALBUMIN/GLOB SERPL: 1.6 {RATIO} (ref 0.8–1.7)
ALP SERPL-CCNC: 39 U/L (ref 45–117)
ALT SERPL-CCNC: 23 U/L (ref 13–56)
ANION GAP SERPL CALC-SCNC: 6 MMOL/L (ref 3–18)
AST SERPL-CCNC: 15 U/L (ref 15–37)
BASOPHILS # BLD: 0.1 K/UL (ref 0–0.06)
BASOPHILS NFR BLD: 1 % (ref 0–2)
BILIRUB SERPL-MCNC: 0.5 MG/DL (ref 0.2–1)
BUN SERPL-MCNC: 24 MG/DL (ref 7–18)
BUN/CREAT SERPL: 26 (ref 12–20)
CALCIUM SERPL-MCNC: 9.1 MG/DL (ref 8.5–10.1)
CHLORIDE SERPL-SCNC: 104 MMOL/L (ref 100–108)
CHOLEST SERPL-MCNC: 150 MG/DL
CO2 SERPL-SCNC: 29 MMOL/L (ref 21–32)
CREAT SERPL-MCNC: 0.94 MG/DL (ref 0.6–1.3)
DIFFERENTIAL METHOD BLD: ABNORMAL
EOSINOPHIL # BLD: 0.2 K/UL (ref 0–0.4)
EOSINOPHIL NFR BLD: 4 % (ref 0–5)
ERYTHROCYTE [DISTWIDTH] IN BLOOD BY AUTOMATED COUNT: 14.3 % (ref 11.6–14.5)
EST. AVERAGE GLUCOSE BLD GHB EST-MCNC: 174 MG/DL
GLOBULIN SER CALC-MCNC: 2.5 G/DL (ref 2–4)
GLUCOSE SERPL-MCNC: 150 MG/DL (ref 74–99)
HBA1C MFR BLD: 7.7 % (ref 4.2–5.6)
HCT VFR BLD AUTO: 34.3 % (ref 35–45)
HDLC SERPL-MCNC: 51 MG/DL (ref 40–60)
HDLC SERPL: 2.9 {RATIO} (ref 0–5)
HGB BLD-MCNC: 10.8 G/DL (ref 12–16)
LDLC SERPL CALC-MCNC: 71.8 MG/DL (ref 0–100)
LIPID PROFILE,FLP: NORMAL
LYMPHOCYTES # BLD: 2.3 K/UL (ref 0.9–3.6)
LYMPHOCYTES NFR BLD: 45 % (ref 21–52)
MCH RBC QN AUTO: 27.3 PG (ref 24–34)
MCHC RBC AUTO-ENTMCNC: 31.5 G/DL (ref 31–37)
MCV RBC AUTO: 86.8 FL (ref 74–97)
MONOCYTES # BLD: 0.4 K/UL (ref 0.05–1.2)
MONOCYTES NFR BLD: 7 % (ref 3–10)
NEUTS SEG # BLD: 2.2 K/UL (ref 1.8–8)
NEUTS SEG NFR BLD: 43 % (ref 40–73)
PLATELET # BLD AUTO: 207 K/UL (ref 135–420)
PMV BLD AUTO: 10.6 FL (ref 9.2–11.8)
POTASSIUM SERPL-SCNC: 4.3 MMOL/L (ref 3.5–5.5)
PROT SERPL-MCNC: 6.6 G/DL (ref 6.4–8.2)
RBC # BLD AUTO: 3.95 M/UL (ref 4.2–5.3)
SODIUM SERPL-SCNC: 139 MMOL/L (ref 136–145)
TRIGL SERPL-MCNC: 136 MG/DL (ref ?–150)
TSH SERPL DL<=0.05 MIU/L-ACNC: 4 UIU/ML (ref 0.36–3.74)
VLDLC SERPL CALC-MCNC: 27.2 MG/DL
WBC # BLD AUTO: 5.1 K/UL (ref 4.6–13.2)

## 2017-09-05 PROCEDURE — 80053 COMPREHEN METABOLIC PANEL: CPT | Performed by: FAMILY MEDICINE

## 2017-09-05 PROCEDURE — 85025 COMPLETE CBC W/AUTO DIFF WBC: CPT | Performed by: FAMILY MEDICINE

## 2017-09-05 PROCEDURE — 84443 ASSAY THYROID STIM HORMONE: CPT | Performed by: FAMILY MEDICINE

## 2017-09-05 PROCEDURE — 36415 COLL VENOUS BLD VENIPUNCTURE: CPT | Performed by: FAMILY MEDICINE

## 2017-09-05 PROCEDURE — 80061 LIPID PANEL: CPT | Performed by: FAMILY MEDICINE

## 2017-09-05 PROCEDURE — 82306 VITAMIN D 25 HYDROXY: CPT | Performed by: FAMILY MEDICINE

## 2017-09-05 PROCEDURE — 83036 HEMOGLOBIN GLYCOSYLATED A1C: CPT | Performed by: FAMILY MEDICINE

## 2017-09-06 LAB — 25(OH)D3 SERPL-MCNC: 42.2 NG/ML (ref 30–100)

## 2017-09-13 ENCOUNTER — HOSPITAL ENCOUNTER (OUTPATIENT)
Dept: LAB | Age: 67
Discharge: HOME | End: 2017-09-13
Payer: COMMERCIAL

## 2017-09-13 ENCOUNTER — TELEPHONE (OUTPATIENT)
Dept: INTERNAL MEDICINE CLINIC | Age: 67
End: 2017-09-13

## 2017-09-13 ENCOUNTER — OFFICE VISIT (OUTPATIENT)
Dept: INTERNAL MEDICINE CLINIC | Age: 67
End: 2017-09-13

## 2017-09-13 VITALS
BODY MASS INDEX: 34.32 KG/M2 | DIASTOLIC BLOOD PRESSURE: 60 MMHG | RESPIRATION RATE: 18 BRPM | SYSTOLIC BLOOD PRESSURE: 144 MMHG | HEIGHT: 65 IN | HEART RATE: 65 BPM | OXYGEN SATURATION: 96 % | WEIGHT: 206 LBS | TEMPERATURE: 97.9 F

## 2017-09-13 DIAGNOSIS — Z23 ENCOUNTER FOR IMMUNIZATION: Primary | ICD-10-CM

## 2017-09-13 DIAGNOSIS — E11.9 TYPE 2 DIABETES MELLITUS WITHOUT COMPLICATION, WITHOUT LONG-TERM CURRENT USE OF INSULIN (HCC): ICD-10-CM

## 2017-09-13 DIAGNOSIS — I10 ESSENTIAL HYPERTENSION: ICD-10-CM

## 2017-09-13 DIAGNOSIS — K21.9 GASTROESOPHAGEAL REFLUX DISEASE WITHOUT ESOPHAGITIS: ICD-10-CM

## 2017-09-13 DIAGNOSIS — E78.5 HYPERLIPIDEMIA, UNSPECIFIED HYPERLIPIDEMIA TYPE: ICD-10-CM

## 2017-09-13 DIAGNOSIS — R79.89 ELEVATED TSH: ICD-10-CM

## 2017-09-13 DIAGNOSIS — D64.9 ANEMIA, UNSPECIFIED TYPE: ICD-10-CM

## 2017-09-13 DIAGNOSIS — E55.9 VITAMIN D DEFICIENCY: ICD-10-CM

## 2017-09-13 DIAGNOSIS — M1A.9XX0 CHRONIC GOUT WITHOUT TOPHUS, UNSPECIFIED CAUSE, UNSPECIFIED SITE: ICD-10-CM

## 2017-09-13 LAB
T4 FREE SERPL-MCNC: 1.4 NG/DL (ref 0.7–1.5)
TSH SERPL DL<=0.05 MIU/L-ACNC: 2.53 UIU/ML (ref 0.36–3.74)

## 2017-09-13 PROCEDURE — 84439 ASSAY OF FREE THYROXINE: CPT | Performed by: PHYSICIAN ASSISTANT

## 2017-09-13 PROCEDURE — 82043 UR ALBUMIN QUANTITATIVE: CPT | Performed by: PHYSICIAN ASSISTANT

## 2017-09-13 PROCEDURE — 84443 ASSAY THYROID STIM HORMONE: CPT | Performed by: PHYSICIAN ASSISTANT

## 2017-09-13 PROCEDURE — 36415 COLL VENOUS BLD VENIPUNCTURE: CPT | Performed by: PHYSICIAN ASSISTANT

## 2017-09-13 NOTE — MR AVS SNAPSHOT
Visit Information Date & Time Provider Department Dept. Phone Encounter #  
 9/13/2017  2:30 PM Antonio Pierce PA-C [facility] [facility] [id number] Follow-up Instructions Return in about 3 weeks (around 10/4/2017), or if symptoms worsen or fail to improve, for follow up diabetes. Your Appointments 9/26/2017  1:20 PM  
New Patient with Katja Plummer MD  
[facility] ([address] [facility]) Appt Note: New patient/Dr. Didi Gallegos at [facility] VB DX: mild heart attack, Cath, 08/18/17  heart blockage 20% 1 month f/u- [facility],  
 [address] [facility] [address] [facility] [address] [phone] [address] [place] [address] [address]  
  
    
 9/27/2017  2:30 PM  
Office Visit with Antonio Pierce PA-C [facility] [facility] (--) Appt Note: 2 week f/u  
 [address] [address] [phone]  
  
   
 [address] [facility] [address] Upcoming Health Maintenance Date Due MICROALBUMIN Q1 10/4/2017 MEDICARE YEARLY EXAM 10/12/2017 HEMOGLOBIN A1C Q6M 3/5/2018 EYE EXAM RETINAL OR DILATED Q1 3/20/2018 BREAST CANCER SCRN MAMMOGRAM 8/25/2018 LIPID PANEL Q1 9/5/2018 FOOT EXAM Q1 9/13/2018 GLAUCOMA SCREENING Q2Y 3/20/2019 COLONOSCOPY 7/23/2019 DTaP/Tdap/Td series (2 - Td) 9/13/2027 Allergies as of 9/13/2017  Review Complete On: 9/13/2017 By: Antonio Pierce PA-C Severity Noted Reaction Type Reactions Sulfa (Sulfonamide Antibiotics)  06/22/2010    Other (comments)  
 nightmares Current Immunizations  Reviewed on 10/13/2014 Name Date Influenza High Dose Vaccine PF 9/13/2017 Influenza Vaccine 10/13/2014, 12/16/2013, 12/24/2012 Influenza Vaccine (Quad) PF 10/11/2016, 12/1/2015  2:30 PM  
 Influenza Vaccine Split 11/30/2011 Pneumococcal Conjugate (PCV-13) 4/19/2017 Pneumococcal Polysaccharide (PPSV-23) 6/11/2014 TD Vaccine 10/8/2008 Tdap 9/13/2017 Not reviewed this visit You Were Diagnosed With   
  
 Codes Comments Encounter for immunization    -  Primary ICD-10-CM: L10 ICD-9-CM: V03.89 Elevated TSH     ICD-10-CM: R94.6 ICD-9-CM: 794.5 Type 2 diabetes mellitus without complication, without long-term current use of insulin (HCC)     ICD-10-CM: E11.9 ICD-9-CM: 250.00 Hyperlipidemia, unspecified hyperlipidemia type     ICD-10-CM: E78.5 ICD-9-CM: 272.4 Gastroesophageal reflux disease without esophagitis     ICD-10-CM: K21.9 ICD-9-CM: 530.81 Vitamin D deficiency     ICD-10-CM: E55.9 ICD-9-CM: 268.9 Essential hypertension     ICD-10-CM: I10 
ICD-9-CM: 401.9 Anemia, unspecified type     ICD-10-CM: D64.9 ICD-9-CM: 104. 9 Vitals BP Pulse Temp Resp Height(growth percentile) Weight(growth percentile) 144/60 (BP 1 Location: Left arm, BP Patient Position: Sitting) 65 97.9 °F (36.6 °C) (Oral) 18 5' 5\" (1.651 m) 206 lb (93.4 kg) SpO2 BMI OB Status Smoking Status 96% 34.28 kg/m2 Hysterectomy Never Smoker Vitals History BMI and BSA Data Body Mass Index Body Surface Area  
 34.28 kg/m 2 2.07 m 2 Preferred Pharmacy Pharmacy Name [facility] [address] [place] [address] [phone] Your Updated Medication List  
  
   
This list is accurate as of: 9/13/17  5:40 PM.  Always use your most recent med list.  
  
  
  
  
 allopurinol 300 mg tablet Commonly known as:  Gold Madhav Take 1 Tab by mouth daily. aspirin delayed-release 81 mg tablet Take  by mouth daily. atorvastatin 80 mg tablet Commonly known as:  LIPITOR Take 1 Tab by mouth daily. cholecalciferol 5,000 unit capsule Commonly known as:  VITAMIN D3 Take 5,000 Units by mouth daily. COREG 3.125 mg tablet Generic drug:  carvedilol Take  by mouth two (2) times daily (with meals). cyanocobalamin ER 1,000 mcg tablet Commonly known as:  VITAMIN B-12 Take 1 tablet by mouth daily. docusate sodium 100 mg capsule Commonly known as:  Austin Stai Take 1 capsule three times per week as needed for constipation  
  
 fenofibrate nanocrystallized 145 mg tablet Commonly known as:  [employer] Take 1 Tab by mouth daily. ferrous sulfate 325 mg (65 mg iron) tablet Take 1 Tab by mouth Daily (before breakfast). metFORMIN 1,000 mg tablet Commonly known as:  GLUCOPHAGE Take 1 Tab by mouth two (2) times daily (with meals). NORVASC 5 mg tablet Generic drug:  amLODIPine Take 5 mg by mouth daily. potassium chloride 20 mEq tablet Commonly known as:  K-DUR, KLOR-CON Take 1 Tab by mouth daily. PriLOSEC OTC 20 mg tablet Generic drug:  omeprazole Take 20 mg by mouth daily. SITagliptin 50 mg tablet Commonly known as:  Milton Fast Take 1 Tab by mouth daily. Indications: type 2 diabetes mellitus  
  
 valsartan-hydroCHLOROthiazide 160-25 mg per tablet Commonly known as:  DIOVAN-HCT  
take 1 tablet by mouth once daily Prescriptions Sent to Pharmacy Refills SITagliptin (JANUVIA) 50 mg tablet 1 Sig: Take 1 Tab by mouth daily. Indications: type 2 diabetes mellitus Class: Normal  
 Pharmacy: [id number] #: [phone] Route: Oral  
  
We Performed the Following INFLUENZA VIRUS VACCINE, HIGH DOSE SEASONAL, PRESERVATIVE FREE [74443 CPT(R)] TETANUS, DIPHTHERIA TOXOIDS AND ACELLULAR PERTUSSIS VACCINE (TDAP), IN INDIVIDS. >=7, IM [id number] CPT(R)] Follow-up Instructions Return in about 3 weeks (around 10/4/2017), or if symptoms worsen or fail to improve, for follow up diabetes. Patient Instructions Vaccine Information Statement Influenza (Flu) Vaccine (Inactivated or Recombinant): What you need to know Many Vaccine Information Statements are available in Slovenian and other languages. See www.immunize.org/vis Hojas de Información Sobre Vacunas están disponibles en Español y en muchos otros idiomas. Visite www.immunize.org/vis 1. Why get vaccinated? Influenza (flu) is a contagious disease that spreads around the [place] [facility] every year, usually between October and May. Flu is caused by influenza viruses, and is spread mainly by coughing, sneezing, and close contact. Anyone can get flu. Flu strikes suddenly and can last several days. Symptoms vary by age, but can include: 
 fever/chills  sore throat  muscle aches  fatigue  cough  headache  runny or stuffy nose Flu can also lead to pneumonia and blood infections, and cause diarrhea and seizures in children. If you have a medical condition, such as heart or lung disease, flu can make it worse. Flu is more dangerous for some people. Infants and young children, people 72years of age and older, pregnant women, and people with certain health conditions or a weakened immune system are at greatest risk. Each year thousands of people in the [facility] die from flu, and many more are hospitalized. Flu vaccine can: 
 keep you from getting flu, 
 make flu less severe if you do get it, and 
 keep you from spreading flu to your family and other people. 2. Inactivated and recombinant flu vaccines A dose of flu vaccine is recommended every flu season. Children 6 months through 6years of age may need two doses during the same flu season. Everyone else needs only one dose each flu season. Some inactivated flu vaccines contain a very small amount of a mercury-based preservative called thimerosal. Studies have not shown thimerosal in vaccines to be harmful, but flu vaccines that do not contain thimerosal are available. There is no live flu virus in flu shots. They cannot cause the flu. There are many flu viruses, and they are always changing. Each year a new flu vaccine is made to protect against three or four viruses that are likely to cause disease in the upcoming flu season. But even when the vaccine doesnt exactly match these viruses, it may still provide some protection Flu vaccine cannot prevent: 
 flu that is caused by a virus not covered by the vaccine, or 
 illnesses that look like flu but are not. It takes about 2 weeks for protection to develop after vaccination, and protection lasts through the flu season. 3. Some people should not get this vaccine Tell the person who is giving you the vaccine:  If you have any severe, life-threatening allergies. If you ever had a life-threatening allergic reaction after a dose of flu vaccine, or have a severe allergy to any part of this vaccine, you may be advised not to get vaccinated. Most, but not all, types of flu vaccine contain a small amount of egg protein.  If you ever had Guillain-Barré Syndrome (also called GBS). Some people with a history of GBS should not get this vaccine. This should be discussed with your doctor.  If you are not feeling well. It is usually okay to get flu vaccine when you have a mild illness, but you might be asked to come back when you feel better. 4. Risks of a vaccine reaction With any medicine, including vaccines, there is a chance of reactions. These are usually mild and go away on their own, but serious reactions are also possible. Most people who get a flu shot do not have any problems with it. Minor problems following a flu shot include:  
 soreness, redness, or swelling where the shot was given  hoarseness  sore, red or itchy eyes  cough  fever  aches  headache  itching  fatigue If these problems occur, they usually begin soon after the shot and last 1 or 2 days. More serious problems following a flu shot can include the following:  There may be a small increased risk of Guillain-Barré Syndrome (GBS) after inactivated flu vaccine. This risk has been estimated at 1 or 2 additional cases per million people vaccinated. This is much lower than the risk of severe complications from flu, which can be prevented by flu vaccine.  Young children who get the flu shot along with pneumococcal vaccine (PCV13) and/or DTaP vaccine at the same time might be slightly more likely to have a seizure caused by fever. Ask your doctor for more information. Tell your doctor if a child who is getting flu vaccine has ever had a seizure. Problems that could happen after any injected vaccine:  People sometimes faint after a medical procedure, including vaccination. Sitting or lying down for about 15 minutes can help prevent fainting, and injuries caused by a fall. Tell your doctor if you feel dizzy, or have vision changes or ringing in the ears.  Some people get severe pain in the shoulder and have difficulty moving the arm where a shot was given. This happens very rarely.  Any medication can cause a severe allergic reaction. Such reactions from a vaccine are very rare, estimated at about 1 in a million doses, and would happen within a few minutes to a few hours after the vaccination. As with any medicine, there is a very remote chance of a vaccine causing a serious injury or death. The safety of vaccines is always being monitored. For more information, visit: www.cdc.gov/vaccinesafety/ 
 
5. What if there is a serious reaction? What should I look for?  Look for anything that concerns you, such as signs of a severe allergic reaction, very high fever, or unusual behavior.  
 
Signs of a severe allergic reaction can include hives, swelling of the face and throat, difficulty breathing, a fast heartbeat, dizziness, and weakness  usually within a few minutes to a few hours after the vaccination. What should I do?  If you think it is a severe allergic reaction or other emergency that cant wait, call 9-1-1 and get the person to the nearest hospital. Otherwise, call your doctor.  Reactions should be reported to the Vaccine Adverse Event Reporting System (VAERS). Your doctor should file this report, or you can do it yourself through  the VAERS web site at www.vaers. [facility].gov, or by calling 3-313.553.6301. VAERS does not give medical advice. 6. The National Vaccine Injury Compensation Program 
 
The [facility] Vaccine Injury Compensation Program (VICP) is a federal program that was created to compensate people who may have been injured by certain vaccines. Persons who believe they may have been injured by a vaccine can learn about the program and about filing a claim by calling 8-208.235.5839 or visiting the [employer] website at www.[facility].gov/vaccinecompensation. There is a time limit to file a claim for compensation. 7. How can I learn more?  Ask your healthcare provider. He or she can give you the vaccine package insert or suggest other sources of information.  Call your local or state health department.  Contact the Centers for Disease Control and Prevention (CDC): 
- Call 8-800.589.4105 (1-800-CDC-INFO) or 
- Visit CDCs website at www.cdc.gov/flu Vaccine Information Statement Inactivated Influenza Vaccine 8/7/2015 
42 [address] 590EP-85 Department of Health and [employer] Centers for Disease Control and Prevention Office Use Only Vaccine Information Statement Tdap (Tetanus, Diphtheria, Pertussis) Vaccine: What You Need to Know Many Vaccine Information Statements are available in Kinyarwanda and other languages. See www.immunize.org/vis. Hojas de Información Sobre Vacunas están disponibles en español y en muchos otros idiomas. Visite Sarah.si 1. Why get vaccinated? Tetanus, diphtheria, and pertussis are very serious diseases. Tdap vaccine can protect us from these diseases. And, Tdap vaccine given to pregnant women can protect  babies against pertussis. TETANUS (Lockjaw) is rare in the [facility] today. It causes painful muscle tightening and stiffness, usually all over the body. ? It can lead to tightening of muscles in the head and neck so you cant open your mouth, swallow, or sometimes even breathe. Tetanus kills about 1 out of 10 people who are infected even after receiving the best medical care. DIPHTHERIA is also rare in the [facility] today. It can cause a thick coating to form in the back of the throat. ? It can lead to breathing problems, heart failure, paralysis, and death. PERTUSSIS (Whooping Cough) causes severe coughing spells, which can cause difficulty breathing, vomiting, and disturbed sleep. ? It can also lead to weight loss, incontinence, and rib fractures. Up to 2 in 100 adolescents and 5 in 100 adults with pertussis are hospitalized or have complications, which could include pneumonia or death. These diseases are caused by bacteria. Diphtheria and pertussis are spread from person to person through secretions from coughing or sneezing. Tetanus enters the body through cuts, scratches, or wounds. Before vaccines, as many as 200,000 cases of diphtheria, 200,000 cases of pertussis, and hundreds of cases of tetanus, were reported in the [place] each year. Since vaccination began, reports of cases for tetanus and diphtheria have dropped by about 99% and for pertussis by about 80%. 2. Tdap vaccine Tdap vaccine can protect adolescents and adults from tetanus, diphtheria, and pertussis. One dose of Tdap is routinely given at age 6 or 15. People who did not get Tdap at that age should get it as soon as possible.  
 
Tdap is especially important for health care professionals and anyone having close contact with a baby younger than 12 months. Pregnant women should get a dose of Tdap during every pregnancy, to protect the  from pertussis. Infants are most at risk for severe, life-threatening complications from pertussis. Another vaccine, called Td, protects against tetanus and diphtheria, but not pertussis. A Td booster should be given every 10 years. Tdap may be given as one of these boosters if you have never gotten Tdap before. Tdap may also be given after a severe cut or burn to prevent tetanus infection. Your doctor or the person giving you the vaccine can give you more information. Tdap may safely be given at the same time as other vaccines. 3. Some people should not get this vaccine  A person who has ever had a life-threatening allergic reaction after a previous dose of any diphtheria, tetanus or pertussis containing vaccine, OR has a severe allergy to any part of this vaccine, should not get Tdap vaccine. Tell the person giving the vaccine about any severe allergies.  Anyone who had coma or long repeated seizures within 7 days after a childhood dose of DTP or DTaP, or a previous dose of Tdap, should not get Tdap, unless a cause other than the vaccine was found. They can still get Td.  Talk to your doctor if you: 
- have seizures or another nervous system problem, 
- had severe pain or swelling after any vaccine containing diphtheria, tetanus or pertussis,  
- ever had a condition called Guillain Barré Syndrome (GBS), 
- arent feeling well on the day the shot is scheduled. 4. Risks With any medicine, including vaccines, there is a chance of side effects. These are usually mild and go away on their own. Serious reactions are also possible but are rare. Most people who get Tdap vaccine do not have any problems with it. Mild Problems following Tdap 
(Did not interfere with activities)  Pain where the shot was given (about 3 in 4 adolescents or 2 in 3 adults)  Redness or swelling where the shot was given (about 1 person in 5)  Mild fever of at least 100.4°F (up to about 1 in 25 adolescents or 1 in 100 adults)  Headache (about 3 or 4 people in 10)  Tiredness (about 1 person in 3 or 4)  Nausea, vomiting, diarrhea, stomach ache (up to 1 in 4 adolescents or 1 in 10 adults)  Chills,  sore joints (about 1 person in 10)  Body aches (about 1 person in 3 or 4)  Rash, swollen glands (uncommon) Moderate Problems following Tdap (Interfered with activities, but did not require medical attention)  Pain where the shot was given (up to 1 in 5 or 6)  Redness or swelling where the shot was given (up to about 1 in 16 adolescents or 1 in 12 adults)  Fever over 102°F (about 1 in 100 adolescents or 1 in 250 adults)  Headache (about 1 in 7 adolescents or 1 in 10 adults)  Nausea, vomiting, diarrhea, stomach ache (up to 1 or 3 people in 100)  Swelling of the entire arm where the shot was given (up to about 1 in 500). Severe Problems following Tdap 
(Unable to perform usual activities; required medical attention)  Swelling, severe pain, bleeding, and redness in the arm where the shot was given (rare). Problems that could happen after any vaccine:  People sometimes faint after a medical procedure, including vaccination. Sitting or lying down for about 15 minutes can help prevent fainting, and injuries caused by a fall. Tell your doctor if you feel dizzy, or have vision changes or ringing in the ears.  Some people get severe pain in the shoulder and have difficulty moving the arm where a shot was given. This happens very rarely.  Any medication can cause a severe allergic reaction. Such reactions from a vaccine are very rare, estimated at fewer than 1 in a million doses, and would happen within a few minutes to a few hours after the vaccination. As with any medicine, there is a very remote chance of a vaccine causing a serious injury or death. The safety of vaccines is always being monitored. For more information, visit: www.cdc.gov/vaccinesafety/ 
 
 
The [facility] Vaccine Injury Compensation Program (VICP) is a federal program that was created to compensate people who may have been injured by certain vaccines. Persons who believe they may have been injured by a vaccine can learn about the program and about filing a claim by calling 1-299.973.8636 or visiting the [employer] [address] website at www.[facility].gov/vaccinecompensation. There is a time limit to file a claim for compensation. 7. How can I learn more?  Ask your doctor. He or she can give you the vaccine package insert or suggest other sources of information.  Call your local or state health department.  Contact the Centers for Disease Control and Prevention (CDC): 
- Call 1-690.237.2592 (1-800-CDC-INFO) or 
- Visit CDCs website at www.cdc.gov/vaccines Vaccine Information Statement Tdap Vaccine 
(2/24/2015) [address] 331MI-10 Department of Health and [employer] Centers for Disease Control and Prevention Office Use Only Introducing [facility]! [employer] introduces Hundo patient portal. Now you can access parts of your medical record, email your doctor's office, and request medication refills online. 1. In your internet browser, go to [email or web address] 2. Click on the First Time User? Click Here link in the Sign In box. You will see the New Member Sign Up page. 3. Enter your Hundo Access Code exactly as it appears below. You will not need to use this code after youve completed the sign-up process. If you do not sign up before the expiration date, you must request a new code. · Hundo Access Code: [email or web address] Expires: 12/12/2017  5:40 PM 
 
4. Enter the last four digits of your Social Security Number (xxxx) and Date of Birth (mm/dd/yyyy) as indicated and click Submit. You will be taken to the next sign-up page. 5. Create a Hundo ID. This will be your Hundo login ID and cannot be changed, so think of one that is secure and easy to remember. 6. Create a Hundo password. You can change your password at any time. 7. Enter your Password Reset Question and Answer. This can be used at a later time if you forget your password. 8. Enter your e-mail address. You will receive e-mail notification when new information is available in [address] 9. Click Sign Up. You can now view and download portions of your medical record. 10. Click the Download Summary menu link to download a portable copy of your medical information. If you have questions, please visit the Frequently Asked Questions section of the Hundo website. Remember, Hundo is NOT to be used for urgent needs. For medical emergencies, dial 911. Now available from your iPhone and Android! Please provide this summary of care documentation to your next provider. Your primary care clinician is listed as Zeinab Bahena. If you have any questions after today's visit, please call [phone].

## 2017-09-13 NOTE — PROGRESS NOTES
Lolly Martinez is a  75 y.o. female presents today for office visit for routine follow up. 1. Have you been to the ER, urgent care clinic or hospitalized since your last visit? YES Patient Roger Schwartz for mild heart attack 17 Aug 2017. Patient said that the records was never sent here. Will have her sigh a information for release form. 2. Have you seen or consulted any other health care providers outside of the [facility] since your last visit (Include any pap smears or colon screening)? NO            VORB: Order for TDAP Injection/[facility] [address]    Patient received TDAP vaccine 0.5ml in right deltoid and FLU high dose in left deltoid 0.5ml. Tolerated well. No signs or symptoms of distress noted. Most current VIS given and consent signed. she was observed  post injection. There were no reactions observed.

## 2017-09-13 NOTE — PATIENT INSTRUCTIONS
Vaccine Information Statement    Influenza (Flu) Vaccine (Inactivated or Recombinant): What you need to know    Many Vaccine Information Statements are available in Occitan and other languages. See www.immunize.org/vis  Hojas de Información Sobre Vacunas están disponibles en Español y en muchos otros idiomas. Visite www.immunize.org/vis    1. Why get vaccinated? Influenza (flu) is a contagious disease that spreads around the [place] every year, usually between October and May. Flu is caused by influenza viruses, and is spread mainly by coughing, sneezing, and close contact. Anyone can get flu. Flu strikes suddenly and can last several days. Symptoms vary by age, but can include:   fever/chills   sore throat   muscle aches   fatigue   cough   headache    runny or stuffy nose    Flu can also lead to pneumonia and blood infections, and cause diarrhea and seizures in children. If you have a medical condition, such as heart or lung disease, flu can make it worse. Flu is more dangerous for some people. Infants and young children, people 72years of age and older, pregnant women, and people with certain health conditions or a weakened immune system are at greatest risk. Each year thousands of people in the [facility] die from flu, and many more are hospitalized. Flu vaccine can:   keep you from getting flu,   make flu less severe if you do get it, and   keep you from spreading flu to your family and other people. 2. Inactivated and recombinant flu vaccines    A dose of flu vaccine is recommended every flu season. Children 6 months through 6years of age may need two doses during the same flu season. Everyone else needs only one dose each flu season.        Some inactivated flu vaccines contain a very small amount of a mercury-based preservative called thimerosal. Studies have not shown thimerosal in vaccines to be harmful, but flu vaccines that do not contain thimerosal are available. There is no live flu virus in flu shots. They cannot cause the flu. There are many flu viruses, and they are always changing. Each year a new flu vaccine is made to protect against three or four viruses that are likely to cause disease in the upcoming flu season. But even when the vaccine doesnt exactly match these viruses, it may still provide some protection    Flu vaccine cannot prevent:   flu that is caused by a virus not covered by the vaccine, or   illnesses that look like flu but are not. It takes about 2 weeks for protection to develop after vaccination, and protection lasts through the flu season. 3. Some people should not get this vaccine    Tell the person who is giving you the vaccine:     If you have any severe, life-threatening allergies. If you ever had a life-threatening allergic reaction after a dose of flu vaccine, or have a severe allergy to any part of this vaccine, you may be advised not to get vaccinated. Most, but not all, types of flu vaccine contain a small amount of egg protein.  If you ever had Guillain-Barré Syndrome (also called GBS). Some people with a history of GBS should not get this vaccine. This should be discussed with your doctor.  If you are not feeling well. It is usually okay to get flu vaccine when you have a mild illness, but you might be asked to come back when you feel better. 4. Risks of a vaccine reaction    With any medicine, including vaccines, there is a chance of reactions. These are usually mild and go away on their own, but serious reactions are also possible. Most people who get a flu shot do not have any problems with it.      Minor problems following a flu shot include:    soreness, redness, or swelling where the shot was given     hoarseness   sore, red or itchy eyes   cough   fever   aches   headache   itching   fatigue  If these problems occur, they usually begin soon after the shot and last 1 or 2 days. More serious problems following a flu shot can include the following:     There may be a small increased risk of Guillain-Barré Syndrome (GBS) after inactivated flu vaccine. This risk has been estimated at 1 or 2 additional cases per million people vaccinated. This is much lower than the risk of severe complications from flu, which can be prevented by flu vaccine.  Young children who get the flu shot along with pneumococcal vaccine (PCV13) and/or DTaP vaccine at the same time might be slightly more likely to have a seizure caused by fever. Ask your doctor for more information. Tell your doctor if a child who is getting flu vaccine has ever had a seizure. Problems that could happen after any injected vaccine:      People sometimes faint after a medical procedure, including vaccination. Sitting or lying down for about 15 minutes can help prevent fainting, and injuries caused by a fall. Tell your doctor if you feel dizzy, or have vision changes or ringing in the ears.  Some people get severe pain in the shoulder and have difficulty moving the arm where a shot was given. This happens very rarely.  Any medication can cause a severe allergic reaction. Such reactions from a vaccine are very rare, estimated at about 1 in a million doses, and would happen within a few minutes to a few hours after the vaccination. As with any medicine, there is a very remote chance of a vaccine causing a serious injury or death. The safety of vaccines is always being monitored. For more information, visit: www.cdc.gov/vaccinesafety/    5. What if there is a serious reaction? What should I look for?  Look for anything that concerns you, such as signs of a severe allergic reaction, very high fever, or unusual behavior.     Signs of a severe allergic reaction can include hives, swelling of the face and throat, difficulty breathing, a fast heartbeat, dizziness, and weakness  usually within a few minutes to a few hours after the vaccination. What should I do?  If you think it is a severe allergic reaction or other emergency that cant wait, call 9-1-1 and get the person to the nearest hospital. Otherwise, call your doctor.  Reactions should be reported to the Vaccine Adverse Event Reporting System (VAERS). Your doctor should file this report, or you can do it yourself through  the VAERS web site at www.vaers. [facility].gov, or by calling 1-566.508.3568. VAERS does not give medical advice. 6. The National Vaccine Injury Compensation Program    The [facility] Vaccine Injury Compensation Program (VICP) is a federal program that was created to compensate people who may have been injured by certain vaccines. Persons who believe they may have been injured by a vaccine can learn about the program and about filing a claim by calling 3-151.156.6252 or visiting the [employer] website at www.[facility].gov/vaccinecompensation. There is a time limit to file a claim for compensation. 7. How can I learn more?  Ask your healthcare provider. He or she can give you the vaccine package insert or suggest other sources of information.  Call your local or state health department.  Contact the Centers for Disease Control and Prevention (CDC):  - Call 1-281.631.9619 (1-800-CDC-INFO) or  - Visit CDCs website at www.cdc.gov/flu    Vaccine Information Statement   Inactivated Influenza Vaccine   8/7/2015  42 [address] Dinah Apley [id number]    Department of Health and Human Services  Centers for Disease Control and Prevention    Office Use Only    Vaccine Information Statement     Tdap (Tetanus, Diphtheria, Pertussis) Vaccine: What You Need to Know    Many Vaccine Information Statements are available in Portuguese and other languages. See www.immunize.org/vis. Hojas de Información Sobre Vacunas están disponibles en español y en muchos otros idiomas. Visite Sarah.si    1. Why get vaccinated?     Tetanus, diphtheria, and pertussis are very serious diseases. Tdap vaccine can protect us from these diseases. And, Tdap vaccine given to pregnant women can protect  babies against pertussis. TETANUS (Lockjaw) is rare in the [facility] today. It causes painful muscle tightening and stiffness, usually all over the body.  It can lead to tightening of muscles in the head and neck so you cant open your mouth, swallow, or sometimes even breathe. Tetanus kills about 1 out of 10 people who are infected even after receiving the best medical care. DIPHTHERIA is also rare in the [facility] today. It can cause a thick coating to form in the back of the throat.  It can lead to breathing problems, heart failure, paralysis, and death. PERTUSSIS (Whooping Cough) causes severe coughing spells, which can cause difficulty breathing, vomiting, and disturbed sleep.  It can also lead to weight loss, incontinence, and rib fractures. Up to 2 in 100 adolescents and 5 in 100 adults with pertussis are hospitalized or have complications, which could include pneumonia or death. These diseases are caused by bacteria. Diphtheria and pertussis are spread from person to person through secretions from coughing or sneezing. Tetanus enters the body through cuts, scratches, or wounds. Before vaccines, as many as 200,000 cases of diphtheria, 200,000 cases of pertussis, and hundreds of cases of tetanus, were reported in the [place] each year. Since vaccination began, reports of cases for tetanus and diphtheria have dropped by about 99% and for pertussis by about 80%. 2. Tdap vaccine    Tdap vaccine can protect adolescents and adults from tetanus, diphtheria, and pertussis. One dose of Tdap is routinely given at age 6 or 15. People who did not get Tdap at that age should get it as soon as possible. Tdap is especially important for health care professionals and anyone having close contact with a baby younger than 12 months. Pregnant women should get a dose of Tdap during every pregnancy, to protect the  from pertussis. Infants are most at risk for severe, life-threatening complications from pertussis. Another vaccine, called Td, protects against tetanus and diphtheria, but not pertussis. A Td booster should be given every 10 years. Tdap may be given as one of these boosters if you have never gotten Tdap before. Tdap may also be given after a severe cut or burn to prevent tetanus infection. Your doctor or the person giving you the vaccine can give you more information. Tdap may safely be given at the same time as other vaccines. 3. Some people should not get this vaccine     A person who has ever had a life-threatening allergic reaction after a previous dose of any diphtheria, tetanus or pertussis containing vaccine, OR has a severe allergy to any part of this vaccine, should not get Tdap vaccine. Tell the person giving the vaccine about any severe allergies.  Anyone who had coma or long repeated seizures within 7 days after a childhood dose of DTP or DTaP, or a previous dose of Tdap, should not get Tdap, unless a cause other than the vaccine was found. They can still get Td.  Talk to your doctor if you:  - have seizures or another nervous system problem,  - had severe pain or swelling after any vaccine containing diphtheria, tetanus or pertussis,   - ever had a condition called Guillain Barré Syndrome (GBS),  - arent feeling well on the day the shot is scheduled. 4. Risks    With any medicine, including vaccines, there is a chance of side effects. These are usually mild and go away on their own. Serious reactions are also possible but are rare. Most people who get Tdap vaccine do not have any problems with it.     Mild Problems following Tdap  (Did not interfere with activities)   Pain where the shot was given (about 3 in 4 adolescents or 2 in 3 adults)   Redness or swelling where the shot was given (about 1 person in 5)   Mild fever of at least 100.4°F (up to about 1 in 25 adolescents or 1 in 100 adults)   Headache (about 3 or 4 people in 10)   Tiredness (about 1 person in 3 or 4)   Nausea, vomiting, diarrhea, stomach ache (up to 1 in 4 adolescents or 1 in 10 adults)   Chills,  sore joints (about 1 person in 10)   Body aches (about 1 person in 3 or 4)    Rash, swollen glands (uncommon)    Moderate Problems following Tdap  (Interfered with activities, but did not require medical attention)   Pain where the shot was given (up to 1 in 5 or 6)    Redness or swelling where the shot was given (up to about 1 in 16 adolescents or 1 in 12 adults)   Fever over 102°F (about 1 in 100 adolescents or 1 in 250 adults)   Headache (about 1 in 7 adolescents or 1 in 10 adults)   Nausea, vomiting, diarrhea, stomach ache (up to 1 or 3 people in 100)   Swelling of the entire arm where the shot was given (up to about 1 in 500). Severe Problems following Tdap  (Unable to perform usual activities; required medical attention)   Swelling, severe pain, bleeding, and redness in the arm where the shot was given (rare). Problems that could happen after any vaccine:     People sometimes faint after a medical procedure, including vaccination. Sitting or lying down for about 15 minutes can help prevent fainting, and injuries caused by a fall. Tell your doctor if you feel dizzy, or have vision changes or ringing in the ears.  Some people get severe pain in the shoulder and have difficulty moving the arm where a shot was given. This happens very rarely.  Any medication can cause a severe allergic reaction. Such reactions from a vaccine are very rare, estimated at fewer than 1 in a million doses, and would happen within a few minutes to a few hours after the vaccination. As with any medicine, there is a very remote chance of a vaccine causing a serious injury or death.     The safety of vaccines is always being monitored. For more information, visit: www.cdc.gov/vaccinesafety/    5. What if there is a serious problem? What should I look for?  Look for anything that concerns you, such as signs of a severe allergic reaction, very high fever, or unusual behavior.  Signs of a severe allergic reaction can include hives, swelling of the face and throat, difficulty breathing, a fast heartbeat, dizziness, and weakness. These would usually start a few minutes to a few hours after the vaccination. What should I do?  If you think it is a severe allergic reaction or other emergency that cant wait, call 9-1-1 or get the person to the nearest hospital. Otherwise, call your doctor.  Afterward, the reaction should be reported to the Vaccine Adverse Event Reporting System (VAERS). Your doctor might file this report, or you can do it yourself through the VAERS web site at www.vaers. [facility]hs.gov, or by calling 6-154.768.4120. VAERS does not give medical advice. 6. The National Vaccine Injury Compensation Program    The [facility] Vaccine Injury Compensation Program (VICP) is a federal program that was created to compensate people who may have been injured by certain vaccines. Persons who believe they may have been injured by a vaccine can learn about the program and about filing a claim by calling 6-287.876.8938 or visiting the [facility] [employer] website at www.[facility].gov/vaccinecompensation. There is a time limit to file a claim for compensation. 7. How can I learn more?  Ask your doctor. He or she can give you the vaccine package insert or suggest other sources of information.  Call your local or state health department.  Contact the Centers for Disease Control and Prevention (CDC):  - Call 6-907.733.8385 (6-726-LOU-INFO) or  - Visit CDCs website at www.cdc.gov/vaccines      Vaccine Information Statement   Tdap Vaccine  (2/24/2015)  42 TISH Gonzalez [id number]    [facility] for Disease Control and Prevention    Office Use Only

## 2017-09-13 NOTE — PROGRESS NOTES
HPI:    Lolly Sargent  is a 75 y.o. female  patient who comes in today for routine follow up for HTN, GERD, anemia, DM, gout, HLD and Vit D deficiency. Patient reports mild heart attack one month ago. Had heart catheterization done, no stents placed. New medications were started with no side effects. Patient has an appointment with Dr. Jenny Montanez on 9/26/2017. Patient admits to compliance on medication but not on diet or exercise. She does not monitor her blood sugars at home. Patient denies SOB, CP, dizziness, headache, syncope, nausea, diarrhea, constipation. She takes a stool softener every 4 days with good result. She was taking care of her 81yo HENRY which brought about severe stress. He has gone to live with other family members and she becomes tearful when she speaks about it but states that her stress level is better. Current Outpatient Prescriptions   Medication Sig Dispense Refill    carvedilol (COREG) 3.125 mg tablet Take  by mouth two (2) times daily (with meals).  amLODIPine (NORVASC) 5 mg tablet Take 5 mg by mouth daily.  atorvastatin (LIPITOR) 80 mg tablet Take 1 Tab by mouth daily. 90 Tab 1    metFORMIN (GLUCOPHAGE) 1,000 mg tablet Take 1 Tab by mouth two (2) times daily (with meals). 180 Tab 3    fenofibrate nanocrystallized (TRICOR) 145 mg tablet Take 1 Tab by mouth daily. 90 Tab 1    ferrous sulfate 325 mg (65 mg iron) tablet Take 1 Tab by mouth Daily (before breakfast). 90 Tab 1    valsartan-hydroCHLOROthiazide (DIOVAN-HCT) 160-25 mg per tablet take 1 tablet by mouth once daily 90 Tab 1    allopurinol (ZYLOPRIM) 300 mg tablet Take 1 Tab by mouth daily. 90 Tab 1    potassium chloride (K-DUR, KLOR-CON) 20 mEq tablet Take 1 Tab by mouth daily. 90 Tab 1    docusate sodium (COLACE) 100 mg capsule Take 1 capsule three times per week as needed for constipation 60 Cap 2    aspirin delayed-release 81 mg tablet Take  by mouth daily.       cyanocobalamin ER (VITAMIN B-12) 1,000 mcg tablet Take 1 tablet by mouth daily. 90 tablet 3    cholecalciferol (VITAMIN D3) 5,000 unit capsule Take 5,000 Units by mouth daily.  omeprazole (PRILOSEC OTC) 20 mg tablet Take 20 mg by mouth daily. Allergies   Allergen Reactions    Sulfa (Sulfonamide Antibiotics) Other (comments)     nightmares      Past Medical History:   Diagnosis Date    Arthritis     Candida intertrigo 7/11/2011    Diabetes ([address]     GERD (gastroesophageal reflux disease)     Gout     [facility] 08/12/2017    mild    Hypercholesterolemia     Hypertension       Past Surgical History:   Procedure Laterality Date    HX BREAST BIOPSY      Calcium deposits    HX DILATION AND CURETTAGE      HX HEART CATHETERIZATION  08/17/2017    HX HEENT  07/2016    cataract surgery bilaterally    HX HYSTERECTOMY      HX KNEE REPLACEMENT      Right    HX ORTHOPAEDIC      HX RADHA AND BSO      HX UROLOGICAL  1998; 1999    Bladder surgery    TN COLONOSCOPY FLX DX W/COLLJ SPEC WHEN PFRMD  7-23-09    normal;       Social History     Social History    Marital status:      Spouse name: N/A    Number of children: N/A    Years of education: N/A     Occupational History    Not on file.      Social History Main Topics    Smoking status: Never Smoker    Smokeless tobacco: Never Used    Alcohol use No    Drug use: No    Sexual activity: Not on file     Other Topics Concern    Not on file     Social History Narrative      Family History   Problem Relation Age of Onset    Hypertension Father     Heart Disease Father     Hypertension Brother     Diabetes Brother     Heart Disease Mother     Breast Cancer Other       Patient Active Problem List   Diagnosis Code    Hyperlipidemia E78.5    GERD (gastroesophageal reflux disease) K21.9    Gout M10.9    Vitamin D deficiency E55.9    Type 2 diabetes mellitus ([address] E11.9    HTN (hypertension) I10    Left thyroid nodule E04.1    Absolute anemia D64.9    Primary osteoarthritis of right knee M17.11    Arthritis of knee M17.10    Cataract of both eyes H26.9    Dry eyes, bilateral H04.123    Vitreous floaters of both eyes H43.393    Lung nodules R91.8            Review of Systems   Constitutional: Negative for diaphoresis and malaise/fatigue. HENT: Positive for hearing loss. Eyes:        'floater' in right eye since cataract surgery   Respiratory: Negative for shortness of breath. Cardiovascular: Negative for chest pain, palpitations, orthopnea and leg swelling. Gastrointestinal: Negative for abdominal pain, blood in stool, constipation, diarrhea, heartburn, nausea and vomiting. Genitourinary: Negative. Neurological: Negative for dizziness, tingling and headaches. Psychiatric/Behavioral: Negative for depression and memory loss. Visit Vitals    /60 (BP 1 Location: Left arm, BP Patient Position: Sitting)    Pulse 65    Temp 97.9 °F (36.6 °C) (Oral)    Resp 18    Ht 5' 5\" (1.651 m)    Wt 206 lb (93.4 kg)    SpO2 96%    BMI 34.28 kg/m2        Physical Exam   Constitutional: She is oriented to person, place, and time and well-developed, well-nourished, and in no distress. HENT:   Head: Normocephalic and atraumatic. Right Ear: External ear normal.   Left Ear: External ear normal.   Eyes: Conjunctivae and EOM are normal. Pupils are equal, round, and reactive to light. Neck: Normal range of motion. Neck supple. Cardiovascular: Normal rate, regular rhythm and normal heart sounds. Exam reveals no gallop and no friction rub. No murmur heard. Pulmonary/Chest: Effort normal and breath sounds normal.   Abdominal: Soft. Bowel sounds are normal. She exhibits no distension and no mass. There is no tenderness. Musculoskeletal: She exhibits no edema, tenderness or deformity. Neurological: She is alert and oriented to person, place, and time. She has normal reflexes. No cranial nerve deficit.  Gait normal.   Skin: Skin is warm and dry.   Psychiatric: Memory and affect normal.   Nursing note and vitals reviewed. 9/5/2017  7:00 PM - [facility]   Component Results   Component Value Flag Ref Range Units Status   WBC 5.1  4.6 - 13.2 K/uL Final   RBC 3.95 (L) 4.20 - 5.30 M/uL Final   HGB 10.8 (L) 12.0 - 16.0 g/dL Final   HCT 34.3 (L) 35.0 - 45.0 % Final   MCV 86.8  74.0 - 97.0 FL Final   MCH 27.3  24.0 - 34.0 PG Final   MCHC 31.5  31.0 - 37.0 g/dL Final   RDW 14.3  11.6 - 14.5 % Final   PLATELET 689  242 - 093 K/uL Final   MPV 10.6  9.2 - 11.8 FL Final   NEUTROPHILS 43  40 - 73 % Final   LYMPHOCYTES 45  21 - 52 % Final   MONOCYTES 7  3 - 10 % Final   EOSINOPHILS 4  0 - 5 % Final   BASOPHILS 1  0 - 2 % Final   ABS. NEUTROPHILS 2.2  1.8 - 8.0 K/UL Final   ABS. LYMPHOCYTES 2.3  0.9 - 3.6 K/UL Final   ABS. MONOCYTES 0.4  0.05 - 1.2 K/UL Final   ABS. EOSINOPHILS 0.2  0.0 - 0.4 K/UL Final   ABS. BASOPHILS 0.1 (H) 0.0 - 0.06 K/UL Final   DF AUTOMATED     Final       Component Value Flag Ref Range Units Status   Sodium 139  136 - 145 mmol/L Final   Potassium 4.3  3.5 - 5.5 mmol/L Final   Chloride 104  100 - 108 mmol/L Final   CO2 29  21 - 32 mmol/L Final   Anion gap 6  3.0 - 18 mmol/L Final   Glucose 150 (H) 74 - 99 mg/dL Final   BUN 24 (H) 7.0 - 18 MG/DL Final   Creatinine 0.94  0.6 - 1.3 MG/DL Final   BUN/Creatinine ratio 26 (H) 12 - 20   Final   GFR est AA >60  >60 ml/min/1.73m2 Final   GFR est non-AA 59 (L) >60 ml/min/1.73m2 Final   Comment:   (NOTE)   Estimated GFR is calculated using the Modification of Diet in Renal   Disease (MDRD) Study equation, reported for both  Americans   (GFRAA) and non- Americans (GFRNA), and normalized to 1.73m2   body surface area. The physician must decide which value applies to   the patient. The MDRD study equation should only be used in   individuals age 25 or older.  It has not been validated for the   following: pregnant women, patients with serious comorbid conditions,   or on certain medications, or persons with extremes of body size,   muscle mass, or nutritional status. Calcium 9.1  8.5 - 10.1 MG/DL Final   Bilirubin, total 0.5  0.2 - 1.0 MG/DL Final   ALT (SGPT) 23  13 - 56 U/L Final   AST (SGOT) 15  15 - 37 U/L Final   Alk. phosphatase 39 (L) 45 - 117 U/L Final   Protein, total 6.6  6.4 - 8.2 g/dL Final   Albumin 4.1  3.4 - 5.0 g/dL Final   Globulin 2.5  2.0 - 4.0 g/dL Final   A-G Ratio 1.6  0.8 - 1.7   Final     Component Value Flag Ref Range Units Status   LIPID PROFILE         Final   Cholesterol, total 150  <200 MG/DL Final   Triglyceride 136  <150 MG/DL Final   Comment:   The drugs N-acetylcysteine (NAC) and   Metamiszole have been found to cause falsely   low results in this chemical assay. Please   be sure to submit blood samples obtained   BEFORE administration of either of these   drugs to assure correct results.       HDL Cholesterol 51  40 - 60 MG/DL Final   LDL, calculated 71.8  0 - 100 MG/DL Final   VLDL, calculated 27.2   MG/DL Final   CHOL/HDL Ratio 2.9  0 - 5.0   Final       Component Value Flag Ref Range Units Status   TSH 4.00 (H) 0.36 - 3.74 uIU/mL Final       Component Value Flag Ref Range Units Status   Vitamin D 25-Hydroxy 42.2  30 - 100 ng/mL Final       Component Value Flag Ref Range Units Status   Hemoglobin A1c 7.7 (H) 4.2 - 5.6 % Final   Comment:   (NOTE)   HbA1C Interpretive Ranges   <5.7              Normal   5.7 - 6.4         Consider Prediabetes   >6.5              Consider Diabetes      Est. average glucose 174   mg/dL Final           Health Maintenance Due   Topic Date Due    DTaP/Tdap/Td series (1 - Tdap) 10/09/2008  9/13/2017    ZOSTER VACCINE AGE 60>  04/02/2009 will wait    FOOT EXAM Q1  07/01/2017 9/13/2017    INFLUENZA AGE 9 TO ADULT  08/01/2017 9/13/2017    MICROALBUMIN Q1  10/04/2017 ordered            Diabetic foot exam performed by Jareth Chester PA-C     Measurement  Response Nurse Comment Physician Comment   Monofilament  R - normal sensation with micro filament  L - normal sensation with micro filament     Pulse DP R - present  L - present     Pulse TP R - present  L - present     Structural deformity R - None  L - None     Skin Integrity / Deformity R - None  L - None        Reviewed by: George [address] - vibratory sense decreased in left foot          Assessment/Plan:    Diagnoses and all orders for this visit:    1. Encounter for immunization  -     Influenza virus vaccine ([address]) 72 years and older  -     Tetanus, diphtheria toxoids and acellular pertussis (TDAP) vaccine, in individuals >=7 years, IM    2. Elevated TSH  -     TSH 3RD GENERATION; Future  -     T4, FREE; Future    3. Type 2 diabetes mellitus without complication, without long-term current use of insulin (HCC) -   HbA1c level increased. Will add Januvia 50 mg.      4. Hyperlipidemia, unspecified hyperlipidemia type  Continue current regimen  5. Gastroesophageal reflux disease without esophagitis  Take PPI in the afternoon,   6. Vitamin D deficiency  Continue current regimen  7. Essential hypertension  BPs okay at home, continue current regimen  8. Anemia, unspecified type    Increase iron to twice daily  Breast exam next visit  Patient to get MMG    Additional Notes: Discussed today's diagnosis, treatment plans. Discussed medication indications and side effects. Preventive Medicine:  Weight loss, blood sugar control, exercise, eat healthy. This was discussed with patient. Weight Management: Diabetic diet information given, Mediterranean diet given. After Visit Summary: Provided and discussed printed patient instructions. Answered all questions and patient acknowledged understanding. Ilya Varma PA-C     I reviewed the patient's medical history, the physician assistant's findings on physical examination, the patient's diagnoses, and treatment plan as documented in the progress note. I concur with the treatment plan as documented.  There are no additional recommendations at this time.     Hermelinda Feldman MD

## 2017-09-14 LAB
CREAT UR-MCNC: 40.63 MG/DL (ref 30–125)
MICROALBUMIN UR-MCNC: 0.2 MG/DL (ref 0–3)
MICROALBUMIN/CREAT UR-RTO: 5 MG/G (ref 0–30)

## 2017-09-14 RX ORDER — ALLOPURINOL 300 MG/1
TABLET ORAL
Qty: 90 TAB | Refills: 1 | Status: SHIPPED | OUTPATIENT
Start: 2017-09-14 | End: 2018-03-20 | Stop reason: SDUPTHER

## 2017-09-14 NOTE — PROGRESS NOTES
Please call patient to let her know that her thyroid function tests and her urine tests were within normal parameters. Thank you.

## 2017-09-14 NOTE — PROGRESS NOTES
I called Pt in regards to lab results. Inform Pt that Thyroid and Urine test are WNL. Pt verbalized understanding.

## 2017-09-15 ENCOUNTER — TELEPHONE (OUTPATIENT)
Dept: INTERNAL MEDICINE CLINIC | Age: 67
End: 2017-09-15

## 2017-09-15 DIAGNOSIS — E11.9 TYPE 2 DIABETES MELLITUS WITHOUT COMPLICATION, WITHOUT LONG-TERM CURRENT USE OF INSULIN (HCC): ICD-10-CM

## 2017-09-15 NOTE — TELEPHONE ENCOUNTER
Patient came into the office and stated that she would like a 90 day supply on the medication Januvia. Please advise. Please call patient when new script is sent in to the pharmacy.

## 2017-09-26 ENCOUNTER — OFFICE VISIT (OUTPATIENT)
Dept: CARDIOLOGY CLINIC | Age: 67
End: 2017-09-26

## 2017-09-26 ENCOUNTER — HOSPITAL ENCOUNTER (OUTPATIENT)
Dept: MAMMOGRAPHY | Age: 67
Discharge: HOME | End: 2017-09-26
Attending: PHYSICIAN ASSISTANT
Payer: COMMERCIAL

## 2017-09-26 VITALS
HEART RATE: 64 BPM | WEIGHT: 206 LBS | OXYGEN SATURATION: 97 % | RESPIRATION RATE: 18 BRPM | BODY MASS INDEX: 34.28 KG/M2 | DIASTOLIC BLOOD PRESSURE: 70 MMHG | SYSTOLIC BLOOD PRESSURE: 134 MMHG

## 2017-09-26 DIAGNOSIS — I10 ESSENTIAL HYPERTENSION: ICD-10-CM

## 2017-09-26 DIAGNOSIS — E78.5 HYPERLIPIDEMIA, UNSPECIFIED HYPERLIPIDEMIA TYPE: ICD-10-CM

## 2017-09-26 DIAGNOSIS — I51.81 TAKOTSUBO CARDIOMYOPATHY: Primary | ICD-10-CM

## 2017-09-26 DIAGNOSIS — Z12.31 VISIT FOR SCREENING MAMMOGRAM: ICD-10-CM

## 2017-09-26 PROCEDURE — 77063 BREAST TOMOSYNTHESIS BI: CPT

## 2017-09-26 RX ORDER — CARVEDILOL 6.25 MG/1
6.25 TABLET ORAL 2 TIMES DAILY WITH MEALS
Qty: 60 TAB | Refills: 6 | Status: SHIPPED | OUTPATIENT
Start: 2017-09-26 | End: 2018-03-26

## 2017-09-26 RX ORDER — AMLODIPINE BESYLATE 5 MG/1
5 TABLET ORAL DAILY
Qty: 90 TAB | Refills: 3 | Status: SHIPPED | OUTPATIENT
Start: 2017-09-26 | End: 2018-11-19 | Stop reason: SDUPTHER

## 2017-09-26 NOTE — MR AVS SNAPSHOT
Visit Information Date & Time Provider Department Dept. Phone Encounter #  
 9/26/2017  1:20 PM Maury Lema MD [facility] [address] [id number] Your Appointments 9/27/2017  2:30 PM  
Office Visit with Medardo Fu PA-C [facility] [place] [address] (--) Appt Note: 2 week f/u  
 [address] [place] [address]  
[phone]  
  
   
 [address] [place] [address] [phone]  
  
    
 3/26/2018  1:40 PM  
Follow Up with Maury Lema MD  
[facility] ([address]) Appt Note: 6 month f/up [place] [phone]  
[phone] [address] Upcoming Health Maintenance Date Due  
 MEDICARE YEARLY EXAM 10/12/2017 HEMOGLOBIN A1C Q6M 3/5/2018 EYE EXAM RETINAL OR DILATED Q1 3/20/2018 BREAST CANCER SCRN MAMMOGRAM 8/25/2018 LIPID PANEL Q1 9/5/2018 FOOT EXAM Q1 9/13/2018 MICROALBUMIN Q1 9/13/2018 GLAUCOMA SCREENING Q2Y 3/20/2019 COLONOSCOPY 7/23/2019 DTaP/Tdap/Td series (2 - Td) 9/13/2027 Allergies as of 9/26/2017  Review Complete On: 9/26/2017 By: Maia Stern LPN Severity Noted Reaction Type Reactions Sulfa (Sulfonamide Antibiotics)  06/22/2010    Other (comments)  
 nightmares Current Immunizations  Reviewed on 10/13/2014 Name Date Influenza High Dose Vaccine PF 9/13/2017 Influenza Vaccine 10/13/2014, 12/16/2013, 12/24/2012 Influenza Vaccine (Quad) PF 10/11/2016, 12/1/2015  2:30 PM  
 Influenza Vaccine Split 11/30/2011 Pneumococcal Conjugate (PCV-13) 4/19/2017 Pneumococcal Polysaccharide (PPSV-23) 6/11/2014 TD Vaccine 10/8/2008 Tdap 9/13/2017 Not reviewed this visit You Were Diagnosed With   
  
 Codes Comments Hyperlipidemia, unspecified hyperlipidemia type    -  Primary ICD-10-CM: E78.5 ICD-9-CM: 272.4 Essential hypertension     ICD-10-CM: I10 
ICD-9-CM: 401.9 Cardiomyopathy, unspecified type ([facility]     ICD-10-CM: I42.9 ICD-9-CM: 425.4 Vitals BP Pulse Resp Weight(growth percentile) SpO2 BMI  
 134/70 64 18 206 lb (93.4 kg) 97% 34.28 kg/m2 OB Status Smoking Status Hysterectomy Never Smoker BMI and BSA Data Body Mass Index Body Surface Area  
 34.28 kg/m 2 2.07 m 2 Preferred Pharmacy Pharmacy Name [facility] [address] [place] [address], [facility] [place] [address] [phone] Your Updated Medication List  
  
   
This list is accurate as of: 9/26/17  2:33 PM.  Always use your most recent med list.  
  
  
  
  
 allopurinol 300 mg tablet Commonly known as:  ZYLOPRIM  
take 1 tablet by mouth once daily  
  
 aspirin delayed-release 81 mg tablet Take  by mouth daily. atorvastatin 80 mg tablet Commonly known as:  LIPITOR Take 1 Tab by mouth daily. cholecalciferol 5,000 unit capsule Commonly known as:  VITAMIN D3 Take 5,000 Units by mouth daily. COREG 3.125 mg tablet Generic drug:  carvedilol Take  by mouth two (2) times daily (with meals). cyanocobalamin ER 1,000 mcg tablet Commonly known as:  VITAMIN B-12 Take 1 tablet by mouth daily. docusate sodium 100 mg capsule Commonly known as:  Vernard Kobi Take 1 capsule three times per week as needed for constipation  
  
 fenofibrate nanocrystallized 145 mg tablet Commonly known as:  [employer] Take 1 Tab by mouth daily. ferrous sulfate 325 mg (65 mg iron) tablet Take 1 Tab by mouth Daily (before breakfast). metFORMIN 1,000 mg tablet Commonly known as:  GLUCOPHAGE Take 1 Tab by mouth two (2) times daily (with meals). NORVASC 5 mg tablet Generic drug:  amLODIPine Take 5 mg by mouth daily. potassium chloride 20 mEq tablet Commonly known as:  K-DUR, KLOR-CON Take 1 Tab by mouth daily. PriLOSEC OTC 20 mg tablet Generic drug:  omeprazole Take 20 mg by mouth daily. SITagliptin 50 mg tablet Commonly known as:  Ky Peasant Take 1 Tab by mouth daily. Indications: type 2 diabetes mellitus  
  
 valsartan-hydroCHLOROthiazide 160-25 mg per tablet Commonly known as:  DIOVAN-HCT  
take 1 tablet by mouth once daily We Performed the Following AMB POC EKG ROUTINE W/ 12 LEADS, INTER & REP [41219 CPT(R)] To-Do List   
 09/26/2017 ECHO:  2D ECHO COMPLETE ADULT (TTE) W OR WO CONTR   
  
 09/26/2017 4:15 PM  
  Appointment with [facility] 1 at [address] [place] [address] ([phone]) OUTSIDE FILMS  - Any outside films related to the study being scheduled should be brought with you on the day of the exam.  If this cannot be done there may be a delay in the reading of the study. MEDICATIONS  - Patient must bring a complete list of all medications currently taking to include prescriptions, over-the-counter meds, herbals, vitamins & any dietary supplements  GENERAL INSTRUCTIONS  - On the day of your exam do not use any bath powder, deodorant or lotions on the armpit area. -Tenderness of breasts may cause an increase of discomfort during procedure. If you are experiencing breast tenderness on the day of your appointment and would like to reschedule, please call [phone].  
  
 03/07/2018 1:30 PM  
  Appointment with [facility]- [id number] MACHINE (WT ) at [facility] NON-INVASIVE CARD ([phone]) Age Limit for ALL Heart procedures @ [facility] facilities: 18 yrs and older only. Under the age of 25, refer to [address] [place] [address] ([phone]). Wt Limit: 350lbs. This study requires patient to bring a written physician's order or MD office may fax the order to [facility] at [phone]. Patient needs to bring a current list of all medications. No preparation is required for this study.   Patients should report 15 minutes prior to their appointment time to the [facility], [address]. Patient Instructions Begin Carvedilol 6.25 mg and Amlodipine 5mg If you have any further questions or concerns, please contact our office. [phone] Introducing [address]! Norma Dias introduces [employer] patient portal. Now you can access parts of your medical record, email your doctor's office, and request medication refills online. 1. In your internet browser, go to [email or web address] 2. Click on the First Time User? Click Here link in the Sign In box. You will see the New Member Sign Up page. 3. Enter your [employer] Access Code exactly as it appears below. You will not need to use this code after youve completed the sign-up process. If you do not sign up before the expiration date, you must request a new code. · [employer] Access Code: [email or web address] Expires: 12/12/2017  5:40 PM 
 
4. Enter the last four digits of your Social Security Number (xxxx) and Date of Birth (mm/dd/yyyy) as indicated and click Submit. You will be taken to the next sign-up page. 5. Create a [employer] ID. This will be your [employer] login ID and cannot be changed, so think of one that is secure and easy to remember. 6. Create a [employer] password. You can change your password at any time. 7. Enter your Password Reset Question and Answer. This can be used at a later time if you forget your password. 8. Enter your e-mail address. You will receive e-mail notification when new information is available in [address] 9. Click Sign Up. You can now view and download portions of your medical record. 10. Click the Download Summary menu link to download a portable copy of your medical information. If you have questions, please visit the Frequently Asked Questions section of the [employer] website. Remember, [employer] is NOT to be used for urgent needs. For medical emergencies, dial 911. Now available from your iPhone and Android! Please provide this summary of care documentation to your next provider. Your primary care clinician is listed as Karen Navarro. If you have any questions after today's visit, please call [phone].

## 2017-09-26 NOTE — PROGRESS NOTES
HISTORY OF PRESENT ILLNESS  Lolly Russell is a 75 y.o. female. HPI  She is referred to my office for continued cardiac care following a recent NSTEMI acute myocardial infarction. She was in her usual state of health until 08/18/2017 when she was admitted with the sudden onset of crushing substernal pain and left shoulder and arm pain. She was having a conversation with friends when she had the sudden onset of crushing substernal pain and left shoulder and arm pain. The chest pain subsided and she subsequently drove home, but the arm pain and shoulder pain persisted. She was subsequently seen in the emergency room and was transferred to [facility] where she was found to have troponin elevation and went on to have cardiac catheterization, which demonstrated:    1. Patent left main trunk                     2. Patent LAD with some intraluminal irregularities with 20% hazy plaquing in the proximal segment              3. Patent circumflex artery                  4. Patent dominant RCA                   5. Severe LV dysfunction with anterior and anterolateral ballooning with an ejection fraction in the 37% range. It was felt that she had a Takotsubo-type myocardial infarction. She underwent an echocardiogram on 08/19/2017, which demonstrated severe LV dysfunction with wall motion abnormalities in the anterior apex with ballooning and an ejection fraction in the 30% range. There was no significant valvular pathology. There was no major complication and she was subsequently discharged on carvedilol 3.125 mg b.i.d. and amlodipine 5 mg per day in addition to her baseline medications. She has since had no recurrence of chest pain or shoulder pain. She is a nonsmoker. She has history of hypertension and diabetes mellitus. She has had dyslipidemia for which she has been on Lipitor with good cholesterol control.   She has a strong family history of coronary artery disease in that her mother had a heart attack in her [de-identified] and three of her brothers had heart attacks in their forties, fifties and [de-identified]. Review of Systems   Constitutional: Negative for malaise/fatigue and weight loss. HENT: Negative for hearing loss. Eyes: Negative for blurred vision and double vision. Respiratory: Negative for shortness of breath. Cardiovascular: Negative for chest pain, palpitations, orthopnea, claudication, leg swelling and PND. Gastrointestinal: Negative for blood in stool, heartburn and melena. Genitourinary: Negative for dysuria, frequency, hematuria and urgency. Musculoskeletal: Negative for back pain and joint pain. Skin: Negative for itching and rash. Neurological: Negative for dizziness, loss of consciousness and weakness. Psychiatric/Behavioral: Negative for depression and memory loss. Physical Exam   Constitutional: She is oriented to person, place, and time. She appears well-developed and well-nourished. HENT:   Head: Normocephalic and atraumatic. Eyes: Conjunctivae are normal. Pupils are equal, round, and reactive to light. Neck: Normal range of motion. Neck supple. No JVD present. Cardiovascular: Normal rate, regular rhythm, S1 normal and S2 normal.   No extrasystoles are present. PMI is not displaced. Exam reveals no gallop and no friction rub. No murmur heard. Pulses:       Carotid pulses are 3+ on the right side, and 3+ on the left side. Pulmonary/Chest: Effort normal. She has no rales. Abdominal: Soft. There is no tenderness. Musculoskeletal: She exhibits no edema. Neurological: She is alert and oriented to person, place, and time. No cranial nerve deficit. Skin: Skin is warm and dry. Psychiatric: She has a normal mood and affect.  Her behavior is normal.     Visit Vitals    /70    Pulse 64    Resp 18    Wt 93.4 kg (206 lb)    SpO2 97%    BMI 34.28 kg/m2       Past Medical History:   Diagnosis Date    Arthritis     Candida intertrigo 7/11/2011    Diabetes (HCC)     GERD (gastroesophageal reflux disease)     Gout     Heart attack ([facility] [place] [address] 08/12/2017    mild    Hypercholesterolemia     Hypertension        Social History     Social History    Marital status:      Spouse name: N/A    Number of children: N/A    Years of education: N/A     Occupational History    Not on file. Social History Main Topics    Smoking status: Never Smoker    Smokeless tobacco: Never Used    Alcohol use No    Drug use: No    Sexual activity: Not on file     Other Topics Concern    Not on file     Social History Narrative       Family History   Problem Relation Age of Onset    Hypertension Father     Heart Disease Father     Hypertension Brother     Diabetes Brother     Heart Disease Mother     Breast Cancer Other        Past Surgical History:   Procedure Laterality Date    HX BREAST BIOPSY      Calcium deposits    HX DILATION AND CURETTAGE      HX HEART CATHETERIZATION  08/17/2017    HX HEENT  07/2016    cataract surgery bilaterally    HX HYSTERECTOMY      HX KNEE REPLACEMENT      Right    HX ORTHOPAEDIC      HX RADHA AND BSO      HX UROLOGICAL  1998; 1999    Bladder surgery    NV COLONOSCOPY FLX DX W/COLLJ SPEC WHEN PFRMD  7-23-09    normal;        Current Outpatient Prescriptions   Medication Sig Dispense Refill    carvedilol (COREG) 6.25 mg tablet Take 1 Tab by mouth two (2) times daily (with meals). 60 Tab 6    amLODIPine (NORVASC) 5 mg tablet Take 1 Tab by mouth daily. 90 Tab 3    SITagliptin (JANUVIA) 50 mg tablet Take 1 Tab by mouth daily. Indications: type 2 diabetes mellitus 90 Tab 2    allopurinol (ZYLOPRIM) 300 mg tablet take 1 tablet by mouth once daily 90 Tab 1    carvedilol (COREG) 3.125 mg tablet Take  by mouth two (2) times daily (with meals).  amLODIPine (NORVASC) 5 mg tablet Take 5 mg by mouth daily.       atorvastatin (LIPITOR) 80 mg tablet Take 1 Tab by mouth daily. 90 Tab 1    metFORMIN (GLUCOPHAGE) 1,000 mg tablet Take 1 Tab by mouth two (2) times daily (with meals). 180 Tab 3    fenofibrate nanocrystallized (TRICOR) 145 mg tablet Take 1 Tab by mouth daily. 90 Tab 1    ferrous sulfate 325 mg (65 mg iron) tablet Take 1 Tab by mouth Daily (before breakfast). 90 Tab 1    valsartan-hydroCHLOROthiazide (DIOVAN-HCT) 160-25 mg per tablet take 1 tablet by mouth once daily 90 Tab 1    potassium chloride (K-DUR, KLOR-CON) 20 mEq tablet Take 1 Tab by mouth daily. 90 Tab 1    docusate sodium (COLACE) 100 mg capsule Take 1 capsule three times per week as needed for constipation 60 Cap 2    aspirin delayed-release 81 mg tablet Take  by mouth daily.  cyanocobalamin ER (VITAMIN B-12) 1,000 mcg tablet Take 1 tablet by mouth daily. 90 tablet 3    cholecalciferol (VITAMIN D3) 5,000 unit capsule Take 5,000 Units by mouth daily.  omeprazole (PRILOSEC OTC) 20 mg tablet Take 20 mg by mouth daily. EKG: unchanged from previous tracings, normal sinus rhythm, nonspecific ST and T waves changes, incomplete RBBB  . ASSESSMENT and PLAN  Encounter Diagnoses   Name Primary?  Takotsubo cardiomyopathy Yes    Hyperlipidemia, unspecified hyperlipidemia type     Essential hypertension    She apparently had an anterior wall myocardial infarction with minimal enzyme elevation and no significant EKG changes with profound anteroapical wall motion abnormalities suggestive of Takotsubo cardiomyopathy, although, she did not have extreme stress or any type of broken-heart situation in her life. There is the possibility that she had acute thrombosis on top of the 20% plaquing of the LAD with spontaneous thrombolysis. Currently, she shows no signs of cardiac decompensation and she has had no symptoms of angina or cardiac arrhythmia. She is currently on an adequate medical regimen. The Takotsubo-type cardiomyopathy was discussed with the patient and her .   I would repeat the echocardiogram in six months or so to see if she would have complete resolution of the severe left ventricular  dysfunction as commonly expected from the Takotsubo cardiomyopathy.

## 2017-09-26 NOTE — PROGRESS NOTES
She is referred to my office for continued cardiac care following a recent acute myocardial infarction. She was doing well until 08/19/2017 with no previous history of chest pain, dyspnea, orthopnea, PND or palpitations. On 08/19/2017, she was having dinner and conversation when she suddenly started to experience severe crushing substernal chest pain and left shoulder and arm pain. The chest pain subsided and she drove home. However, her left arm pain and shoulder pain persisted and she was subsequently seen at [facility] and was transferred to [facility].  She had a positive troponin and went on to have cardiac catheterization, which demonstrated:    1. Patent left main trunk                   2. Patent LAD with some irregularities and some hazy 20% stenosis in the proximal segment              3. Patent circumflex artery                   4. Patent dominant RCA                  5. Severe LV dysfunction with anteroapical ballooning with overall EF in the 37% range. It was felt that she had a Takotsubo-type myocardial infarction or cardiomyopathy. She had an echocardiogram on 08/19/2017, which demonstrated severe LV dysfunction with an estimated EF in the 30% range with apical hypokinesis and akinesis. Ejection fraction was estimated in the 20% range. There was no significant valvular pathology. She was discharged on carvedilol 3.125 mg twice per day and amlodipine 5 mg per day in addition to her regular regimen. Since discharge, she has had no recurrence of chest pain. She has had no dyspnea or palpitations. She is a nonsmoker. She has history of hypertension, diabetes mellitus and hypercholesterolemia on Lipitor 80 mg per day. She has a strong family history of coronary artery disease in that her mother had a heart attack in her [de-identified] and she has three brothers that had heart attacks in their forties, fifties and [de-identified].

## 2017-09-26 NOTE — PATIENT INSTRUCTIONS
Begin Carvedilol 6.25 mg and Amlodipine 5mg  If you have any further questions or concerns, please contact our office. [phone]

## 2017-09-27 ENCOUNTER — OFFICE VISIT (OUTPATIENT)
Dept: INTERNAL MEDICINE CLINIC | Age: 67
End: 2017-09-27

## 2017-09-27 VITALS
TEMPERATURE: 96.6 F | OXYGEN SATURATION: 99 % | SYSTOLIC BLOOD PRESSURE: 143 MMHG | HEIGHT: 65 IN | RESPIRATION RATE: 16 BRPM | WEIGHT: 205 LBS | HEART RATE: 66 BPM | BODY MASS INDEX: 34.16 KG/M2 | DIASTOLIC BLOOD PRESSURE: 59 MMHG

## 2017-09-27 DIAGNOSIS — E55.9 VITAMIN D DEFICIENCY: ICD-10-CM

## 2017-09-27 DIAGNOSIS — E78.5 HYPERLIPIDEMIA, UNSPECIFIED HYPERLIPIDEMIA TYPE: Primary | ICD-10-CM

## 2017-09-27 DIAGNOSIS — R92.8 ABNORMAL MAMMOGRAM OF LEFT BREAST: ICD-10-CM

## 2017-09-27 DIAGNOSIS — I10 ESSENTIAL HYPERTENSION: ICD-10-CM

## 2017-09-27 DIAGNOSIS — K21.9 GASTROESOPHAGEAL REFLUX DISEASE WITHOUT ESOPHAGITIS: ICD-10-CM

## 2017-09-27 NOTE — PROGRESS NOTES
HPI:    Lolly Vides  is a 75 y.o. female  patient who comes in today for follow up on recent labs and MMG with no complaints. Patient denies SOB, CP, dizziness, headache. Current Outpatient Prescriptions   Medication Sig Dispense Refill    carvedilol (COREG) 6.25 mg tablet Take 1 Tab by mouth two (2) times daily (with meals). 60 Tab 6    amLODIPine (NORVASC) 5 mg tablet Take 1 Tab by mouth daily. 90 Tab 3    SITagliptin (JANUVIA) 50 mg tablet Take 1 Tab by mouth daily. Indications: type 2 diabetes mellitus 90 Tab 2    allopurinol (ZYLOPRIM) 300 mg tablet take 1 tablet by mouth once daily 90 Tab 1    carvedilol (COREG) 3.125 mg tablet Take  by mouth two (2) times daily (with meals).  amLODIPine (NORVASC) 5 mg tablet Take 5 mg by mouth daily.  atorvastatin (LIPITOR) 80 mg tablet Take 1 Tab by mouth daily. 90 Tab 1    metFORMIN (GLUCOPHAGE) 1,000 mg tablet Take 1 Tab by mouth two (2) times daily (with meals). 180 Tab 3    fenofibrate nanocrystallized (TRICOR) 145 mg tablet Take 1 Tab by mouth daily. 90 Tab 1    ferrous sulfate 325 mg (65 mg iron) tablet Take 1 Tab by mouth Daily (before breakfast). 90 Tab 1    valsartan-hydroCHLOROthiazide (DIOVAN-HCT) 160-25 mg per tablet take 1 tablet by mouth once daily 90 Tab 1    potassium chloride (K-DUR, KLOR-CON) 20 mEq tablet Take 1 Tab by mouth daily. 90 Tab 1    docusate sodium (COLACE) 100 mg capsule Take 1 capsule three times per week as needed for constipation 60 Cap 2    aspirin delayed-release 81 mg tablet Take  by mouth daily.  cyanocobalamin ER (VITAMIN B-12) 1,000 mcg tablet Take 1 tablet by mouth daily. 90 tablet 3    cholecalciferol (VITAMIN D3) 5,000 unit capsule Take 5,000 Units by mouth daily.  omeprazole (PRILOSEC OTC) 20 mg tablet Take 20 mg by mouth daily.         Allergies   Allergen Reactions    Sulfa (Sulfonamide Antibiotics) Other (comments)     nightmares      Past Medical History:   Diagnosis Date    Arthritis  Candida intertrigo 7/11/2011    Diabetes (HCC)     GERD (gastroesophageal reflux disease)     Gout     [facility]    mild    Hypercholesterolemia     Hypertension       Past Surgical History:   Procedure Laterality Date    HX BREAST BIOPSY      Calcium deposits    HX DILATION AND CURETTAGE      HX HEART CATHETERIZATION  08/17/2017    HX HEENT  07/2016    cataract surgery bilaterally    HX HYSTERECTOMY      HX KNEE REPLACEMENT      Right    HX ORTHOPAEDIC      HX RADHA AND BSO      HX UROLOGICAL  1998; 1999    Bladder surgery    MI COLONOSCOPY FLX DX W/COLLJ SPEC WHEN PFRMD  7-23-09    normal;       Social History     Social History    Marital status:      Spouse name: N/A    Number of children: N/A    Years of education: N/A     Occupational History    Not on file. Social History Main Topics    Smoking status: Never Smoker    Smokeless tobacco: Never Used    Alcohol use No    Drug use: No    Sexual activity: Not on file     Other Topics Concern    Not on file     Social History Narrative      Family History   Problem Relation Age of Onset    Hypertension Father     Heart Disease Father     Hypertension Brother     Diabetes Brother     Heart Disease Mother     Breast Cancer Other       Patient Active Problem List   Diagnosis Code    Hyperlipidemia E78.5    GERD (gastroesophageal reflux disease) K21.9    Gout M10.9    Vitamin D deficiency E55.9    Type 2 diabetes mellitus ([facility] [address] E11.9    HTN (hypertension) I10    Left thyroid nodule E04.1    Absolute anemia D64.9    Primary osteoarthritis of right knee M17.11    Arthritis of knee M17.10    Cataract of both eyes H26.9    Dry eyes, bilateral H04.123    Vitreous floaters of both eyes H43.393    Lung nodules R91.8    Takotsubo cardiomyopathy I51.81            Review of Systems   Constitutional: Negative for malaise/fatigue. Respiratory: Negative for shortness of breath. Cardiovascular: Negative for chest pain, palpitations and leg swelling. Neurological: Negative for dizziness and headaches. Psychiatric/Behavioral: Negative for depression. The patient is not nervous/anxious. Visit Vitals    /59 (BP 1 Location: Left arm, BP Patient Position: Sitting)    Pulse 66    Temp 96.6 °F (35.9 °C) (Oral)    Resp 16    Ht 5' 5\" (1.651 m)    Wt 205 lb (93 kg)    SpO2 99%    BMI 34.11 kg/m2        Physical Exam   Constitutional: She is oriented to person, place, and time and well-developed, well-nourished, and in no distress. HENT:   Head: Normocephalic and atraumatic. Eyes: Conjunctivae are normal. Pupils are equal, round, and reactive to light. Neck: Normal range of motion. Neck supple. Cardiovascular: Normal rate and regular rhythm. Pulmonary/Chest: Effort normal and breath sounds normal.   Abdominal: Soft. Bowel sounds are normal.   Musculoskeletal: She exhibits no edema. Neurological: She is alert and oriented to person, place, and time. Psychiatric: Affect normal.   Vitals reviewed. Assessment/Plan:      Diagnoses and all orders for this visit:    1. Hyperlipidemia, unspecified hyperlipidemia type  Continue current regimen, repeat labs x 4 months    2. Gastroesophageal reflux disease without esophagitis  Continue current regimen    3. Vitamin D deficiency  Continue current regimen    4. Essential hypertension  Continue current regimen, BP readings at home are within normal range. 5. Abnormal mammogram of left breast  Discussed results of MMG. Will order additional imaging. Answered all questions and patient acknowledged understanding. Additional Notes: Discussed today's diagnosis, treatment plans. Discussed medication indications and side effects.       Keyshawn Delgado PA-C     I reviewed the patient's medical history, the physician assistant's findings on physical examination, the patient's diagnoses, and treatment plan as documented in the progress note. I concur with the treatment plan as documented. There are no additional recommendations at this time.     Heidy Cruz MD

## 2017-09-27 NOTE — PATIENT INSTRUCTIONS
Do this 5 times a day:  Take 5 Breaths  Breathe in for a count of 5  Hold for a count of 5  Breathe out for a count of 5      Meditation: Three times a day. Sit in an area where you won't be disturbed for 5 - 10 min  Close your eyes  Visualize a huge anchor attached to a thick chain dropping from your feet to the center of the earth  Visualize a huge beam of light shooting out of the top of your head and melting into with the sun  Visualize a bright blue \"energy tube\"  surrounding your spine, running from the bottom of your feet to the crown of your head and connecting the anchor chain to the light beam  Next, visualize an bright blue, egg shaped energy shell surrounding your entire body; it extends 6\" below your feet, 6\" above your head and 6\" from your finger tips if your arms are fully extended  Inside this shell is a brilliant blue liquid energy swirling around      Reminders  Every hour tell yourself: \"No matter what happens, I can handle it\"  Every hour tell yourself: \"I accept all parts of me, even the parts I don't like and even the parts I don't want others to see\"      OTC Remedies:  [employer] Rescue Remedy - 4 drops in 4 oz of water 4 times a day  Way Calm Aid by [employer] - 1 capsule daily (80 mg food grade lavendar oil)  Theanine Serine by [employer] - 1 pill up to 3 times a day  Do this 5 times a day:  Take 5 Breaths  Breathe in for a count of 5  Hold for a count of 5  Breathe out for a count of 5      Meditation: Three times a day.   Sit in an area where you won't be disturbed for 5 - 10 min  Close your eyes  Visualize a huge anchor attached to a thick chain dropping from your feet to the center of the earth  Visualize a huge beam of light shooting out of the top of your head and melting into with the sun  Visualize a bright blue \"energy tube\"  surrounding your spine, running from the bottom of your feet to the crown of your head and connecting the anchor chain to the light beam  Next, visualize an bright blue, egg shaped energy shell surrounding your entire body; it extends 6\" below your feet, 6\" above your head and 6\" from your finger tips if your arms are fully extended  Inside this shell is a brilliant blue liquid energy swirling around      Reminders  Every hour tell yourself: \"No matter what happens, I can handle it\"  Every hour tell yourself: \"I accept all parts of me, even the parts I don't like and even the parts I don't want others to see\"      OTC Remedies:  [employer] Rescue Remedy - 4 drops in 4 oz of water 4 times a day  Way Calm Aid by [employer] - 1 capsule daily (80 mg food grade lavendar oil)  Theanine Serine by [employer] - 1 pill up to 3 times a day      Shaklee - stress complex

## 2017-09-27 NOTE — PROGRESS NOTES
Lolly Yousif is a  75 y.o. female presents today for office visit for routine follow up. 1. Have you been to the ER, urgent care clinic or hospitalized since your last visit? NO     2. Have you seen or consulted any other health care providers outside of the [address] [place] [address] since your last visit (Include any pap smears or colon screening)? YES DR Smith & Sarah Beth              .

## 2017-09-27 NOTE — MR AVS SNAPSHOT
Visit Information Date & Time Provider Department Dept. Phone Encounter #  
 9/27/2017  2:30 PM Dorinda Spencer PA-C [facility] [place] [address] [id number] Follow-up Instructions Return in about 3 months (around 12/27/2017), or if symptoms worsen or fail to improve, for one week prior. Your Appointments 3/26/2018  1:40 PM  
Follow Up with Annabelle Weaver MD  
[facility] Appt Note: 6 month f/up [place] [address] [address] [address] [phone] [address] [place] [address] [address] Upcoming Health Maintenance Date Due  
 MEDICARE YEARLY EXAM 10/12/2017 HEMOGLOBIN A1C Q6M 3/5/2018 EYE EXAM RETINAL OR DILATED Q1 3/20/2018 BREAST CANCER SCRN MAMMOGRAM 8/25/2018 LIPID PANEL Q1 9/5/2018 FOOT EXAM Q1 9/13/2018 MICROALBUMIN Q1 9/13/2018 GLAUCOMA SCREENING Q2Y 3/20/2019 COLONOSCOPY 7/23/2019 DTaP/Tdap/Td series (2 - Td) 9/13/2027 Allergies as of 9/27/2017  Review Complete On: 9/27/2017 By: Dorinda Spencer PA-C Severity Noted Reaction Type Reactions Sulfa (Sulfonamide Antibiotics)  06/22/2010    Other (comments)  
 nightmares Current Immunizations  Reviewed on 10/13/2014 Name Date Influenza High Dose Vaccine PF 9/13/2017 Influenza Vaccine 10/13/2014, 12/16/2013, 12/24/2012 Influenza Vaccine (Quad) PF 10/11/2016, 12/1/2015  2:30 PM  
 Influenza Vaccine Split 11/30/2011 Pneumococcal Conjugate (PCV-13) 4/19/2017 Pneumococcal Polysaccharide (PPSV-23) 6/11/2014 TD Vaccine 10/8/2008 Tdap 9/13/2017 Not reviewed this visit You Were Diagnosed With   
  
 Codes Comments Hyperlipidemia, unspecified hyperlipidemia type    -  Primary ICD-10-CM: E78.5 ICD-9-CM: 272.4 Gastroesophageal reflux disease without esophagitis     ICD-10-CM: K21.9 ICD-9-CM: 530.81   
 Vitamin D deficiency     ICD-10-CM: E55.9 ICD-9-CM: 268.9 Essential hypertension     ICD-10-CM: I10 
ICD-9-CM: 401.9 Abnormal mammogram of left breast     ICD-10-CM: R92.8 ICD-9-CM: 793.80 Vitals BP Pulse Temp Resp Height(growth percentile) Weight(growth percentile) 143/59 (BP 1 Location: Left arm, BP Patient Position: Sitting) 66 96.6 °F (35.9 °C) (Oral) 16 5' 5\" (1.651 m) 205 lb (93 kg) SpO2 BMI OB Status Smoking Status 99% 34.11 kg/m2 Hysterectomy Never Smoker Vitals History BMI and BSA Data Body Mass Index Body Surface Area  
 34.11 kg/m 2 2.07 m 2 Preferred Pharmacy Pharmacy Name [facility] [address] [place] [address], [facility] [place] [address] [phone] Your Updated Medication List  
  
   
This list is accurate as of: 9/27/17  3:14 PM.  Always use your most recent med list.  
  
  
  
  
 allopurinol 300 mg tablet Commonly known as:  ZYLOPRIM  
take 1 tablet by mouth once daily  
  
 aspirin delayed-release 81 mg tablet Take  by mouth daily. atorvastatin 80 mg tablet Commonly known as:  LIPITOR Take 1 Tab by mouth daily. cholecalciferol 5,000 unit capsule Commonly known as:  VITAMIN D3 Take 5,000 Units by mouth daily. * COREG 3.125 mg tablet Generic drug:  carvedilol Take  by mouth two (2) times daily (with meals). * carvedilol 6.25 mg tablet Commonly known as:  Ladan Alstrom Take 1 Tab by mouth two (2) times daily (with meals). cyanocobalamin ER 1,000 mcg tablet Commonly known as:  VITAMIN B-12 Take 1 tablet by mouth daily. docusate sodium 100 mg capsule Commonly known as:  Erica Booze Take 1 capsule three times per week as needed for constipation  
  
 fenofibrate nanocrystallized 145 mg tablet Commonly known as:  [employer] Take 1 Tab by mouth daily. ferrous sulfate 325 mg (65 mg iron) tablet Take 1 Tab by mouth Daily (before breakfast). metFORMIN 1,000 mg tablet Commonly known as:  GLUCOPHAGE Take 1 Tab by mouth two (2) times daily (with meals). * NORVASC 5 mg tablet Generic drug:  amLODIPine Take 5 mg by mouth daily. * amLODIPine 5 mg tablet Commonly known as:  Wellington Emerald Take 1 Tab by mouth daily. potassium chloride 20 mEq tablet Commonly known as:  K-DUR, KLOR-CON Take 1 Tab by mouth daily. PriLOSEC OTC 20 mg tablet Generic drug:  omeprazole Take 20 mg by mouth daily. SITagliptin 50 mg tablet Commonly known as:  Lizzette Seat Take 1 Tab by mouth daily. Indications: type 2 diabetes mellitus  
  
 valsartan-hydroCHLOROthiazide 160-25 mg per tablet Commonly known as:  DIOVAN-HCT  
take 1 tablet by mouth once daily * Notice: This list has 4 medication(s) that are the same as other medications prescribed for you. Read the directions carefully, and ask your doctor or other care provider to review them with you. Follow-up Instructions Return in about 3 months (around 12/27/2017), or if symptoms worsen or fail to improve, for one week prior. To-Do List   
 03/07/2018 1:30 PM  
  Appointment with [facility]- IE33 MACHINE (WT ) at [facility] NON-INVASIVE CARD ([phone]) Age Limit for ALL Heart procedures @ all [facility] facilities: 18 yrs and older only. Under the age of 25, refer to [facility] ([phone]). Wt Limit: 350lbs. This study requires patient to bring a written physician's order or MD office may fax the order to [facility] at [phone]. Patient needs to bring a current list of all medications. No preparation is required for this study. Patients should report 15 minutes prior to their appointment time to the [facility], [address] [place] [address]/[address]. Patient Instructions Do this 5 times a day: 
Take 5 Breaths Breathe in for a count of 5 Hold for a count of 5 Breathe out for a count of 5 Meditation: Three times a day. Sit in an area where you won't be disturbed for 5 - 10 min Close your eyes Visualize a huge anchor attached to a thick chain dropping from your feet to the center of the earth Visualize a huge beam of light shooting out of the top of your head and melting into with the sun Visualize a bright blue \"energy tube\"  surrounding your spine, running from the bottom of your feet to the crown of your head and connecting the anchor chain to the light beam 
Next, visualize an bright blue, egg shaped energy shell surrounding your entire body; it extends 6\" below your feet, 6\" above your head and 6\" from your finger tips if your arms are fully extended Inside this shell is a brilliant blue liquid energy swirling around Reminders Every hour tell yourself: \"No matter what happens, I can handle it\" Every hour tell yourself: \"I accept all parts of me, even the parts I don't like and even the parts I don't want others to see\" OTC Remedies: 
Bach Rescue Remedy - 4 drops in 4 oz of water 4 times a day Way Calm Aid by [address] - 1 capsule daily (80 mg food grade lavendar oil) Theanine Serine by Source Naturals - 1 pill up to 3 times a day Do this 5 times a day: 
Take 5 Breaths Breathe in for a count of 5 Hold for a count of 5 Breathe out for a count of 5 Meditation: Three times a day. Sit in an area where you won't be disturbed for 5 - 10 min Close your eyes Visualize a huge anchor attached to a thick chain dropping from your feet to the center of the earth Visualize a huge beam of light shooting out of the top of your head and melting into with the sun Visualize a bright blue \"energy tube\"  surrounding your spine, running from the bottom of your feet to the crown of your head and connecting the anchor chain to the light beam 
Next, visualize an bright blue, egg shaped energy shell surrounding your entire body; it extends 6\" below your feet, 6\" above your head and 6\" from your finger tips if your arms are fully extended Inside this shell is a brilliant blue liquid energy swirling around Reminders Every hour tell yourself: \"No matter what happens, I can handle it\" Every hour tell yourself: \"I accept all parts of me, even the parts I don't like and even the parts I don't want others to see\" OTC Remedies: 
Bach Rescue Remedy - 4 drops in 4 oz of water 4 times a day Way Calm Aid by Nature[employer] Way - 1 capsule daily (80 mg food grade lavendar oil) Theanine Serine by Source Naturals - 1 pill up to 3 times a day Shaklee - stress complex Introducing [facility] [place] [facility]! [facility] [employer] introduces [employer] patient portal. Now you can access parts of your medical record, email your doctor's office, and request medication refills online. 1. In your internet browser, go to [email or web address] 2. Click on the First Time User? Click Here link in the Sign In box. You will see the New Member Sign Up page. 3. Enter your [employer] Access Code exactly as it appears below. You will not need to use this code after youve completed the sign-up process. If you do not sign up before the expiration date, you must request a new code. · [employer] Access Code: [email or web address] Expires: 12/12/2017  5:40 PM 
 
4. Enter the last four digits of your Social Security Number (xxxx) and Date of Birth (mm/dd/yyyy) as indicated and click Submit. You will be taken to the next sign-up page. 5. Create a CoWare ID. This will be your [employer] login ID and cannot be changed, so think of one that is secure and easy to remember. 6. Create a [employer] password. You can change your password at any time. 7. Enter your Password Reset Question and Answer. This can be used at a later time if you forget your password. 8. Enter your e-mail address. You will receive e-mail notification when new information is available in [address] 9. Click Sign Up. You can now view and download portions of your medical record. 10. Click the Download Summary menu link to download a portable copy of your medical information. If you have questions, please visit the Frequently Asked Questions section of the [employer] website. Remember, [employer] is NOT to be used for urgent needs. For medical emergencies, dial 911. Now available from your iPhone and Android! Please provide this summary of care documentation to your next provider. Your primary care clinician is listed as Hung Smith. If you have any questions after today's visit, please call [phone].

## 2017-09-29 DIAGNOSIS — R92.8 ABNORMAL MAMMOGRAM OF LEFT BREAST: Primary | ICD-10-CM

## 2017-09-29 NOTE — PROGRESS NOTES
Please let Mrs. Calles know that orders have been placed for a diagnostic mammogram of the left breast and they should be calling her to set up the appointment.

## 2017-10-06 DIAGNOSIS — D64.9 ANEMIA, UNSPECIFIED TYPE: ICD-10-CM

## 2017-10-06 RX ORDER — LANOLIN ALCOHOL/MO/W.PET/CERES
CREAM (GRAM) TOPICAL
Qty: 90 TAB | Refills: 0 | Status: SHIPPED | OUTPATIENT
Start: 2017-10-06 | End: 2017-11-13

## 2017-10-11 ENCOUNTER — NURSE NAVIGATOR (OUTPATIENT)
Dept: MAMMOGRAPHY | Age: 67
End: 2017-10-11

## 2017-10-11 ENCOUNTER — HOSPITAL ENCOUNTER (OUTPATIENT)
Dept: MAMMOGRAPHY | Age: 67
Discharge: HOME | End: 2017-10-11
Attending: PHYSICIAN ASSISTANT
Payer: COMMERCIAL

## 2017-10-11 ENCOUNTER — HOSPITAL ENCOUNTER (OUTPATIENT)
Dept: ULTRASOUND IMAGING | Age: 67
Discharge: HOME | End: 2017-10-11
Attending: PHYSICIAN ASSISTANT
Payer: COMMERCIAL

## 2017-10-11 DIAGNOSIS — R92.8 ABNORMAL MAMMOGRAM OF LEFT BREAST: ICD-10-CM

## 2017-10-11 PROCEDURE — 77065 DX MAMMO INCL CAD UNI: CPT

## 2017-10-11 PROCEDURE — 76642 ULTRASOUND BREAST LIMITED: CPT

## 2017-10-11 NOTE — PROGRESS NOTES
Met with Ms. Calles regarding recommendation for Left Breast Ultrasound Biopsy. Explained procedure and answered all questions. Patient requested to have the biopsy done by a Radiologist and has been scheduled at the breast center on Friday 10/13/17 @ 1pm    Her follow up appointment with the Radiologist and Nurse Navigator to receive biopsy results is scheduled on: 10/25/17      ULTRASOUND GUIDED BREAST BIOPSY   PRE-PROCEDURE PATIENT INSTRUCTIONS      · Patient should arrive 15 minutes before scheduled procedure. · Patient may eat a regular breakfast and/or lunch - NO fasting required. · Patient should take all regular medication - EXCEPT BLOOD THINNERS. Blood thinners must be stopped at least 5 DAYS before procedure. *Coumadin, Heparin, Lovenox, Plavix   · Patient should bring a good support bra to wear after the biopsy to reduce breast motion and be more comfortable. · Only the breast will be numbed for procedure. Nothing that will make the patient drowsy or light headed so they may drive. · There may be some bruising to breast - thats normal. The body should absorb it in 5 - 7 days. · Ice should be applied to the biopsy site intermittently through out the day. · Patient may take Tylenol after procedure for pain relief. · Avoid heavy lifting and strenuous exercise for 24 hours. · Leave steri-strip on biopsy site - may shower over site and pat dry.

## 2017-10-12 ENCOUNTER — TELEPHONE (OUTPATIENT)
Dept: FAMILY MEDICINE CLINIC | Age: 67
End: 2017-10-12

## 2017-10-12 DIAGNOSIS — D50.9 IRON DEFICIENCY ANEMIA, UNSPECIFIED IRON DEFICIENCY ANEMIA TYPE: Primary | ICD-10-CM

## 2017-10-12 NOTE — TELEPHONE ENCOUNTER
Requested Prescriptions     Pending Prescriptions Disp Refills    carvedilol (COREG) 3.125 mg tablet 180 Tab 1     Sig: Take  by mouth two (2) times daily (with meals).         Pt request 90 day supply

## 2017-10-12 NOTE — TELEPHONE ENCOUNTER
Pt request to speak with SOFIA Marquez, re lab test that checks for inherited issue for silent heart attack.      Said called \"hemochrometosis\"

## 2017-10-13 ENCOUNTER — HOSPITAL ENCOUNTER (OUTPATIENT)
Dept: ULTRASOUND IMAGING | Age: 67
Discharge: HOME | End: 2017-10-13
Attending: PHYSICIAN ASSISTANT
Payer: COMMERCIAL

## 2017-10-13 ENCOUNTER — HOSPITAL ENCOUNTER (OUTPATIENT)
Dept: MAMMOGRAPHY | Age: 67
Discharge: HOME | End: 2017-10-13
Attending: PHYSICIAN ASSISTANT
Payer: COMMERCIAL

## 2017-10-13 DIAGNOSIS — Z98.890 STATUS POST BREAST BIOPSY: ICD-10-CM

## 2017-10-13 DIAGNOSIS — N63.0 LUMP OR MASS IN BREAST: ICD-10-CM

## 2017-10-13 DIAGNOSIS — R92.8 ABNORMAL MAMMOGRAM: ICD-10-CM

## 2017-10-13 PROCEDURE — 88360 TUMOR IMMUNOHISTOCHEM/MANUAL: CPT | Performed by: PHYSICIAN ASSISTANT

## 2017-10-13 PROCEDURE — 74011000250 HC RX REV CODE- 250: Performed by: PHYSICIAN ASSISTANT

## 2017-10-13 PROCEDURE — 88305 TISSUE EXAM BY PATHOLOGIST: CPT | Performed by: PHYSICIAN ASSISTANT

## 2017-10-13 PROCEDURE — 77065 DX MAMMO INCL CAD UNI: CPT

## 2017-10-13 PROCEDURE — 88374 M/PHMTRC ALYS ISHQUANT/SEMIQ: CPT | Performed by: PHYSICIAN ASSISTANT

## 2017-10-13 PROCEDURE — 19083 BX BREAST 1ST LESION US IMAG: CPT

## 2017-10-13 RX ORDER — LIDOCAINE HYDROCHLORIDE AND EPINEPHRINE 15; 5 MG/ML; UG/ML
7 INJECTION, SOLUTION EPIDURAL ONCE
Status: COMPLETED | OUTPATIENT
Start: 2017-10-13 | End: 2017-10-13

## 2017-10-13 RX ORDER — CARVEDILOL 3.12 MG/1
3.12 TABLET ORAL 2 TIMES DAILY WITH MEALS
Qty: 180 TAB | Refills: 1 | Status: CANCELLED | OUTPATIENT
Start: 2017-10-13

## 2017-10-13 RX ORDER — LIDOCAINE HYDROCHLORIDE 10 MG/ML
10 INJECTION, SOLUTION EPIDURAL; INFILTRATION; INTRACAUDAL; PERINEURAL
Status: COMPLETED | OUTPATIENT
Start: 2017-10-13 | End: 2017-10-13

## 2017-10-13 RX ADMIN — LIDOCAINE HYDROCHLORIDE AND EPINEPHRINE 105 MG: 15; 5 INJECTION, SOLUTION EPIDURAL at 13:42

## 2017-10-13 RX ADMIN — LIDOCAINE HYDROCHLORIDE 10 ML: 10 INJECTION, SOLUTION EPIDURAL; INFILTRATION; INTRACAUDAL; PERINEURAL at 13:42

## 2017-10-13 NOTE — TELEPHONE ENCOUNTER
Spoke with patient and she was requesting medication that was old. We reviewed medication and updated her medication list.  She does not need Coreg 3.125. Also, she has some concerns over possible hemochromatosis in relation to her heart attack. I explained that last year she was iron deficient. We will re-test with anemia panel. Patient will come in next week for labs. Side note: she is doing well with recovery from breast biopsy.

## 2017-10-18 ENCOUNTER — DOCUMENTATION ONLY (OUTPATIENT)
Dept: SURGERY | Age: 67
End: 2017-10-18

## 2017-10-18 NOTE — PROGRESS NOTES
Met with patient and radiologist, Dr. Roseann Antonio to review breast biopsy results. Answered all questions, and reviewed Breast cancer education manual with patient. She has my contact information to reach me with any questions or concerns.  She has an appointment with Dr. Shola Mccurdy 10/23/17 at 11 am.

## 2017-10-23 ENCOUNTER — OFFICE VISIT (OUTPATIENT)
Dept: SURGERY | Age: 67
End: 2017-10-23

## 2017-10-23 VITALS — DIASTOLIC BLOOD PRESSURE: 59 MMHG | RESPIRATION RATE: 18 BRPM | SYSTOLIC BLOOD PRESSURE: 134 MMHG

## 2017-10-23 DIAGNOSIS — I51.81 TAKOTSUBO CARDIOMYOPATHY: Primary | ICD-10-CM

## 2017-10-23 NOTE — PROGRESS NOTES
Progress Note    Patient: Lolly LALA [facility]  MRN: [id number]  SSN: [id number]   YOB: 1950  Age: 75 y.o. Sex: female     Chief Complaint   Patient presents with    Breast Cancer     newly diagnosed       HPI    Ms. Beatrice Callaway is a 56-year-old woman who presents with a history of a left breast biopsy that adenocarcinoma of intraductal type. She has no memory relatives with breast cancer but does have a niece that has had it. Her menarche was 14 her first child at 26. She has had a right breast biopsy decades ago that was negative. She has had an non-STEMI myocardial infarction in August of this year with takotsubo's anomaly. She is done well from that and is not on any anticoagulants. She does take a calcium channel blocker and a beta-blocker. She has not had any more chest pain since then. I discussed this with her cardiology group. They would like to get another echo and we will order that today. I spent 45 minutes discussing with her and her  the current treatment for breast cancer. I discussed the surgery for the disease both breast conservation and mastectomy as well as bilateral mastectomy. I discussed radiation therapy, chemotherapy, and hormone therapy as well. I have personally reviewed the images of her ultrasound as well as her mammogram.  I have answered all questions. Greater than 50% of this visit was devoted to counseling.     Past Medical History:   Diagnosis Date    Arthritis     Candida intertrigo 7/11/2011    Diabetes ([address]     GERD (gastroesophageal reflux disease)     Gout     Heart attack 08/12/2017    mild    Hypercholesterolemia     Hypertension      Past Surgical History:   Procedure Laterality Date    HX BREAST BIOPSY      Calcium deposits    HX DILATION AND CURETTAGE      HX HEART CATHETERIZATION  08/17/2017    HX HEENT  07/2016    cataract surgery bilaterally    HX HYSTERECTOMY      HX KNEE REPLACEMENT      Right    HX ORTHOPAEDIC      HX RADHA AND BSO  HX UROLOGICAL  1998; 1999    Bladder surgery    WA COLONOSCOPY FLX DX W/COLLJ SPEC WHEN PFRMD  7-23-09    normal;      Allergies   Allergen Reactions    Sulfa (Sulfonamide Antibiotics) Other (comments)     nightmares     Current Outpatient Prescriptions   Medication Sig Dispense Refill    carvedilol (COREG) 6.25 mg tablet Take 1 Tab by mouth two (2) times daily (with meals). 60 Tab 6    amLODIPine (NORVASC) 5 mg tablet Take 1 Tab by mouth daily. 90 Tab 3    SITagliptin (JANUVIA) 50 mg tablet Take 1 Tab by mouth daily. Indications: type 2 diabetes mellitus 90 Tab 2    allopurinol (ZYLOPRIM) 300 mg tablet take 1 tablet by mouth once daily 90 Tab 1    atorvastatin (LIPITOR) 80 mg tablet Take 1 Tab by mouth daily. 90 Tab 1    metFORMIN (GLUCOPHAGE) 1,000 mg tablet Take 1 Tab by mouth two (2) times daily (with meals). 180 Tab 3    fenofibrate nanocrystallized (TRICOR) 145 mg tablet Take 1 Tab by mouth daily. 90 Tab 1    valsartan-hydroCHLOROthiazide (DIOVAN-HCT) 160-25 mg per tablet take 1 tablet by mouth once daily 90 Tab 1    potassium chloride (K-DUR, KLOR-CON) 20 mEq tablet Take 1 Tab by mouth daily. 90 Tab 1    docusate sodium (COLACE) 100 mg capsule Take 1 capsule three times per week as needed for constipation 60 Cap 2    aspirin delayed-release 81 mg tablet Take  by mouth daily.  cyanocobalamin ER (VITAMIN B-12) 1,000 mcg tablet Take 1 tablet by mouth daily. 90 tablet 3    cholecalciferol (VITAMIN D3) 5,000 unit capsule Take 5,000 Units by mouth daily.  omeprazole (PRILOSEC OTC) 20 mg tablet Take 20 mg by mouth daily.  ferrous sulfate 325 mg (65 mg iron) tablet take 1 tablet by mouth once daily (BEFORE BREAKFAST) 90 Tab 0     Social History     Social History    Marital status:      Spouse name: N/A    Number of children: N/A    Years of education: N/A     Occupational History    Not on file.      Social History Main Topics    Smoking status: Never Smoker  Smokeless tobacco: Never Used    Alcohol use No    Drug use: No    Sexual activity: Not on file     Other Topics Concern    Not on file     Social History Narrative     Family History   Problem Relation Age of Onset    Hypertension Father     Heart Disease Father     Hypertension Brother     Diabetes Brother     Heart Disease Mother     Breast Cancer Other          Review of systems:  Patient denies any reflux, emesis, abdominal pain, change in bowel habits, hematochezia, melena, fever, weight loss, fatigue chills, dermatitis, abnormal moles, change in vision, vertigo, epistaxis, dysphagia, hoarseness, chest pain, palpitations, hypertension, edema, cough, shortness of breath, wheezing, hemoptysis, snoring, hematuria, diabetes, thyroid disease, anemia, bruising, history of blood transfusion, dizziness, headache, or fainting. Physical Examination    Well developed well nourished female in no apparent distress  Visit Vitals    /59    Resp 18      Head: normocephalic, atraumatic  Mouth: Clear, no overt lesions, oral mucosa pink and moist  Neck: supple, no masses, no adenopathy or carotid bruits, trachea midline  Resp: clear to auscultation bilaterally, no wheeze, rhonchi or rales, excursions normal and symmetrical  Cardio: Regular rate and rhythm, no murmurs, clicks, gallops or rubs, no edema or varicosities  Abdomen: soft, nontender, nondistended, normoactive bowel sounds, no hernias, no hepatosplenomegaly,   Back: Deferred  Extremeties: warm, well-perfused, no tenderness or swelling, normal gait/station  Neuro: sensation and strength grossly intact and symmetrical  Psych: alert and oriented to person, place and time  Breast exam lateral breast exam without dominant mass, skin change, nipple discharge, or lymphadenopathy.   Contusion left breast.  Well-healed biopsy site    IMPRESSION  Small left breast cancer stage undetermined    PLAN  No orders of the defined types were placed in this encounter.     Echocardiogram and then breast conservation surgery with a lumpectomy and sentinel lymph node biopsy  Michael Garnica MD

## 2017-10-24 ENCOUNTER — HOSPITAL ENCOUNTER (OUTPATIENT)
Dept: LAB | Age: 67
Discharge: HOME | End: 2017-10-24
Payer: COMMERCIAL

## 2017-10-24 DIAGNOSIS — D50.9 IRON DEFICIENCY ANEMIA, UNSPECIFIED IRON DEFICIENCY ANEMIA TYPE: ICD-10-CM

## 2017-10-24 LAB
FERRITIN SERPL-MCNC: 36 NG/ML (ref 8–388)
IRON SERPL-MCNC: 48 UG/DL (ref 50–175)

## 2017-10-24 PROCEDURE — 84466 ASSAY OF TRANSFERRIN: CPT | Performed by: PHYSICIAN ASSISTANT

## 2017-10-24 PROCEDURE — 82728 ASSAY OF FERRITIN: CPT | Performed by: PHYSICIAN ASSISTANT

## 2017-10-24 PROCEDURE — 36415 COLL VENOUS BLD VENIPUNCTURE: CPT | Performed by: PHYSICIAN ASSISTANT

## 2017-10-24 PROCEDURE — 83540 ASSAY OF IRON: CPT | Performed by: PHYSICIAN ASSISTANT

## 2017-10-25 LAB — TRANSFERRIN SERPL-MCNC: 404 MG/DL (ref 200–370)

## 2017-10-27 ENCOUNTER — TELEPHONE (OUTPATIENT)
Dept: FAMILY MEDICINE CLINIC | Age: 67
End: 2017-10-27

## 2017-10-27 NOTE — PROGRESS NOTES
Spoke with Naomi Frederick to understand patient's concerns before calling the patient. I was updated on her response to the recent diagnosis and questions this patient had.

## 2017-10-27 NOTE — TELEPHONE ENCOUNTER
Called and left vm to talk to this patient to check on her and answer any questions I can about her recent diagnosis and plan of treatment.

## 2017-10-30 ENCOUNTER — HOSPITAL ENCOUNTER (OUTPATIENT)
Dept: NON INVASIVE DIAGNOSTICS | Age: 67
Discharge: HOME | End: 2017-10-30
Payer: COMMERCIAL

## 2017-10-30 ENCOUNTER — TELEPHONE (OUTPATIENT)
Dept: FAMILY MEDICINE CLINIC | Age: 67
End: 2017-10-30

## 2017-10-30 DIAGNOSIS — I51.81 TAKOTSUBO CARDIOMYOPATHY: ICD-10-CM

## 2017-10-30 PROCEDURE — 93306 TTE W/DOPPLER COMPLETE: CPT

## 2017-10-30 NOTE — TELEPHONE ENCOUNTER
Pt returned your call wanting to know if you gotten lab results and wanted to check with you please advise [phone]

## 2017-10-31 ENCOUNTER — TELEPHONE (OUTPATIENT)
Dept: CARDIOLOGY CLINIC | Age: 67
End: 2017-10-31

## 2017-10-31 NOTE — LETTER
10/31/2017 9:48 AM 
 
Ms. Lolly LALA [address] 
[address] [facility] [id number] Lolly SPIKE Calles was seen in our office on 09/26/2017 for cardiac evaluation. From a cardiac standpoint she is cleared to have breast surgery with Dr Stew Suárez. Please feel free to contact our office if you have any questions regarding this patient.   
 
 
 
 
Sincerely, 
 
 
 
Johana Yi, DO

## 2017-10-31 NOTE — TELEPHONE ENCOUNTER
Matilda Mcgowan from Dr Rodriguez's office contacted us regarding clearance for breast surgery. Dr Jacob Khan is currently out of the office. Dr Hernesto Hunt reviewed last encounter along with ECHO. Patient is cleared for surgery with Dr Marsha Reese per Dr Hernesto Hunt.

## 2017-11-01 NOTE — TELEPHONE ENCOUNTER
Please let patient know that her labs look stable. I just wanted to chat with her. I will be back in the office on 11/6/2017.

## 2017-11-02 NOTE — TELEPHONE ENCOUNTER
Left detailed message with SOFIA Chow comment concerning stable labs & that she will return to the office on 11/6/17 and to call Pt then.

## 2017-11-08 DIAGNOSIS — E87.6 HYPOKALEMIA: ICD-10-CM

## 2017-11-09 RX ORDER — POTASSIUM CHLORIDE 20 MEQ/1
TABLET, EXTENDED RELEASE ORAL
Qty: 90 TAB | Refills: 1 | Status: SHIPPED | OUTPATIENT
Start: 2017-11-09 | End: 2018-05-02 | Stop reason: SDUPTHER

## 2017-11-10 ENCOUNTER — HOSPITAL ENCOUNTER (OUTPATIENT)
Dept: PREADMISSION TESTING | Age: 67
Discharge: HOME | End: 2017-11-10
Payer: COMMERCIAL

## 2017-11-10 ENCOUNTER — OFFICE VISIT (OUTPATIENT)
Dept: SURGERY | Age: 67
End: 2017-11-10

## 2017-11-10 VITALS — DIASTOLIC BLOOD PRESSURE: 70 MMHG | RESPIRATION RATE: 18 BRPM | SYSTOLIC BLOOD PRESSURE: 123 MMHG

## 2017-11-10 DIAGNOSIS — C50.912 MALIGNANT NEOPLASM OF LEFT FEMALE BREAST, UNSPECIFIED ESTROGEN RECEPTOR STATUS, UNSPECIFIED SITE OF BREAST (HCC): ICD-10-CM

## 2017-11-10 DIAGNOSIS — C50.912 MALIGNANT NEOPLASM OF LEFT FEMALE BREAST, UNSPECIFIED ESTROGEN RECEPTOR STATUS, UNSPECIFIED SITE OF BREAST (HCC): Primary | ICD-10-CM

## 2017-11-10 LAB
ANION GAP SERPL CALC-SCNC: 6 MMOL/L (ref 3–18)
BASOPHILS # BLD: 0 K/UL (ref 0–0.1)
BASOPHILS NFR BLD: 1 % (ref 0–2)
BUN SERPL-MCNC: 19 MG/DL (ref 7–18)
BUN/CREAT SERPL: 19 (ref 12–20)
CALCIUM SERPL-MCNC: 9.4 MG/DL (ref 8.5–10.1)
CHLORIDE SERPL-SCNC: 102 MMOL/L (ref 100–108)
CO2 SERPL-SCNC: 29 MMOL/L (ref 21–32)
CREAT SERPL-MCNC: 1 MG/DL (ref 0.6–1.3)
DIFFERENTIAL METHOD BLD: ABNORMAL
EOSINOPHIL # BLD: 0.2 K/UL (ref 0–0.4)
EOSINOPHIL NFR BLD: 3 % (ref 0–5)
ERYTHROCYTE [DISTWIDTH] IN BLOOD BY AUTOMATED COUNT: 13.8 % (ref 11.6–14.5)
GLUCOSE SERPL-MCNC: 125 MG/DL (ref 74–99)
HCT VFR BLD AUTO: 31.9 % (ref 35–45)
HGB BLD-MCNC: 10.2 G/DL (ref 12–16)
LYMPHOCYTES # BLD: 2.2 K/UL (ref 0.9–3.6)
LYMPHOCYTES NFR BLD: 35 % (ref 21–52)
MCH RBC QN AUTO: 27.1 PG (ref 24–34)
MCHC RBC AUTO-ENTMCNC: 32 G/DL (ref 31–37)
MCV RBC AUTO: 84.6 FL (ref 74–97)
MONOCYTES # BLD: 0.4 K/UL (ref 0.05–1.2)
MONOCYTES NFR BLD: 6 % (ref 3–10)
NEUTS SEG # BLD: 3.5 K/UL (ref 1.8–8)
NEUTS SEG NFR BLD: 55 % (ref 40–73)
PLATELET # BLD AUTO: 291 K/UL (ref 135–420)
PMV BLD AUTO: 11 FL (ref 9.2–11.8)
POTASSIUM SERPL-SCNC: 4.8 MMOL/L (ref 3.5–5.5)
RBC # BLD AUTO: 3.77 M/UL (ref 4.2–5.3)
SODIUM SERPL-SCNC: 137 MMOL/L (ref 136–145)
WBC # BLD AUTO: 6.4 K/UL (ref 4.6–13.2)

## 2017-11-10 PROCEDURE — 36415 COLL VENOUS BLD VENIPUNCTURE: CPT

## 2017-11-10 PROCEDURE — 80048 BASIC METABOLIC PNL TOTAL CA: CPT

## 2017-11-10 PROCEDURE — 85025 COMPLETE CBC W/AUTO DIFF WBC: CPT

## 2017-11-10 NOTE — PROGRESS NOTES
Progress Note    Patient: [facility]  MRN: [id number]  SSN: [id number]   YOB: 1950  Age: 75 y.o. Sex: female     Chief Complaint   Patient presents with    Follow-up     echo results       HPI    Ms Clovis Cox is a 48-year-old woman with a left breast cancer who had a MI in August followed by Yulissa ramos. She is had a repeat echo since then and has an ejection fraction of 55% and essentially a normal echo. She has been cleared by cardiology for her lumpectomy and sentinel lymph node biopsy. She is back today to talk about that and we spent 45 minutes or more talking about the treatment, the surgery, and the risks from a cardiac standpoint. Past Medical History:   Diagnosis Date    Arthritis     Candida intertrigo 7/11/2011    Diabetes ([address]     GERD (gastroesophageal reflux disease)     Gout     Heart attack 08/12/2017    mild    Hypercholesterolemia     Hypertension      Past Surgical History:   Procedure Laterality Date    HX BREAST BIOPSY      Calcium deposits    HX DILATION AND CURETTAGE      HX HEART CATHETERIZATION  08/17/2017    HX HEENT  07/2016    cataract surgery bilaterally    HX HYSTERECTOMY      HX KNEE REPLACEMENT      Right    HX ORTHOPAEDIC      HX RADHA AND BSO      HX UROLOGICAL  1998; 1999    Bladder surgery    VT COLONOSCOPY FLX DX W/COLLJ SPEC WHEN PFRMD  7-23-09    normal;      Allergies   Allergen Reactions    Sulfa (Sulfonamide Antibiotics) Other (comments)     nightmares     Current Outpatient Prescriptions   Medication Sig Dispense Refill    potassium chloride (K-DUR, KLOR-CON) 20 mEq tablet take 1 tablet by mouth once daily 90 Tab 1    carvedilol (COREG) 6.25 mg tablet Take 1 Tab by mouth two (2) times daily (with meals). 60 Tab 6    amLODIPine (NORVASC) 5 mg tablet Take 1 Tab by mouth daily. 90 Tab 3    SITagliptin (JANUVIA) 50 mg tablet Take 1 Tab by mouth daily.  Indications: type 2 diabetes mellitus 90 Tab 2    allopurinol (ZYLOPRIM) 300 mg tablet take 1 tablet by mouth once daily 90 Tab 1    atorvastatin (LIPITOR) 80 mg tablet Take 1 Tab by mouth daily. 90 Tab 1    metFORMIN (GLUCOPHAGE) 1,000 mg tablet Take 1 Tab by mouth two (2) times daily (with meals). 180 Tab 3    fenofibrate nanocrystallized (TRICOR) 145 mg tablet Take 1 Tab by mouth daily. 90 Tab 1    valsartan-hydroCHLOROthiazide (DIOVAN-HCT) 160-25 mg per tablet take 1 tablet by mouth once daily 90 Tab 1    aspirin delayed-release 81 mg tablet Take  by mouth daily.  cyanocobalamin ER (VITAMIN B-12) 1,000 mcg tablet Take 1 tablet by mouth daily. 90 tablet 3    cholecalciferol (VITAMIN D3) 5,000 unit capsule Take 5,000 Units by mouth daily.  omeprazole (PRILOSEC OTC) 20 mg tablet Take 20 mg by mouth daily.  ferrous sulfate 325 mg (65 mg iron) tablet take 1 tablet by mouth once daily (BEFORE BREAKFAST) 90 Tab 0    docusate sodium (COLACE) 100 mg capsule Take 1 capsule three times per week as needed for constipation 60 Cap 2     Social History     Social History    Marital status:      Spouse name: N/A    Number of children: N/A    Years of education: N/A     Occupational History    Not on file.      Social History Main Topics    Smoking status: Never Smoker    Smokeless tobacco: Never Used    Alcohol use No    Drug use: No    Sexual activity: Not on file     Other Topics Concern    Not on file     Social History Narrative     Family History   Problem Relation Age of Onset    Hypertension Father     Heart Disease Father     Hypertension Brother     Diabetes Brother     Heart Disease Mother     Breast Cancer Other          Review of systems:  Patient denies any reflux, emesis, abdominal pain, change in bowel habits, hematochezia, melena, fever, weight loss, fatigue chills, dermatitis, abnormal moles, change in vision, vertigo, epistaxis, dysphagia, hoarseness, chest pain, palpitations, hypertension, edema, cough, shortness of breath, wheezing, hemoptysis, snoring, hematuria, diabetes, thyroid disease, anemia, bruising, history of blood transfusion, dizziness, headache, or fainting.     Physical Examination    Well developed well nourished female in no apparent distress  Visit Vitals    /70    Resp 18      Head: normocephalic, atraumatic  Mouth: Clear, no overt lesions, oral mucosa pink and moist  Neck: supple, no masses, no adenopathy or carotid bruits, trachea midline  Resp: clear to auscultation bilaterally, no wheeze, rhonchi or rales, excursions normal and symmetrical  Cardio: Regular rate and rhythm, no murmurs, clicks, gallops or rubs, no edema or varicosities  Abdomen: soft, nontender, nondistended, normoactive bowel sounds, no hernias, no hepatosplenomegaly,   Back: Deferred  Extremeties: warm, well-perfused, no tenderness or swelling, normal gait/station  Neuro: sensation and strength grossly intact and symmetrical  Psych: alert and oriented to person, place and time  Breast exam deferred    IMPRESSION  Known left breast cancer    PLAN  Orders Placed This Encounter    [facility]ER NDL/WIRE/CLIP/SEED BREAST LT     Standing Status:   Future     Standing Expiration Date:   12/10/2018     Order Specific Question:   Reason for Exam     Answer:   L breast cancer    NM LYMPHOSCINTIG LT BREAST     Standing Status:   Future     Standing Expiration Date:   12/10/2018     Order Specific Question:   Reason for Exam     Answer:   L breast cancer    CBC WITH AUTOMATED DIFF     Standing Status:   Future     Number of Occurrences:   1     Standing Expiration Date:   39/27/8984    METABOLIC PANEL, BASIC     Standing Status:   Future     Number of Occurrences:   1     Standing Expiration Date:   11/11/2018      lumpectomy with needle localization and lymphoscintigraphy the day before  Meghan Jorgensen MD

## 2017-11-13 ENCOUNTER — ANESTHESIA EVENT (OUTPATIENT)
Dept: SURGERY | Age: 67
End: 2017-11-13
Payer: COMMERCIAL

## 2017-11-13 ENCOUNTER — HOSPITAL ENCOUNTER (OUTPATIENT)
Dept: NUCLEAR MEDICINE | Age: 67
Discharge: HOME | End: 2017-11-13
Payer: COMMERCIAL

## 2017-11-13 DIAGNOSIS — C50.912 MALIGNANT NEOPLASM OF LEFT FEMALE BREAST, UNSPECIFIED ESTROGEN RECEPTOR STATUS, UNSPECIFIED SITE OF BREAST (HCC): ICD-10-CM

## 2017-11-13 PROCEDURE — 74011000250 HC RX REV CODE- 250

## 2017-11-13 PROCEDURE — 78195 LYMPH SYSTEM IMAGING: CPT

## 2017-11-13 RX ORDER — LIDOCAINE HYDROCHLORIDE 10 MG/ML
INJECTION, SOLUTION EPIDURAL; INFILTRATION; INTRACAUDAL; PERINEURAL
Status: COMPLETED
Start: 2017-11-13 | End: 2017-11-13

## 2017-11-13 RX ORDER — SODIUM BICARBONATE 84 MG/ML
INJECTION, SOLUTION INTRAVENOUS
Status: COMPLETED
Start: 2017-11-13 | End: 2017-11-13

## 2017-11-13 RX ADMIN — SODIUM BICARBONATE: 84 INJECTION, SOLUTION INTRAVENOUS at 14:00

## 2017-11-13 RX ADMIN — LIDOCAINE HYDROCHLORIDE: 10 INJECTION, SOLUTION EPIDURAL; INFILTRATION; INTRACAUDAL; PERINEURAL at 15:42

## 2017-11-14 ENCOUNTER — APPOINTMENT (OUTPATIENT)
Dept: MAMMOGRAPHY | Age: 67
End: 2017-11-14
Payer: COMMERCIAL

## 2017-11-14 ENCOUNTER — HOSPITAL ENCOUNTER (OUTPATIENT)
Age: 67
Setting detail: OUTPATIENT SURGERY
Discharge: HOME | End: 2017-11-14
Payer: COMMERCIAL

## 2017-11-14 ENCOUNTER — ANESTHESIA (OUTPATIENT)
Dept: SURGERY | Age: 67
End: 2017-11-14
Payer: COMMERCIAL

## 2017-11-14 VITALS
OXYGEN SATURATION: 94 % | WEIGHT: 200.13 LBS | HEIGHT: 65 IN | SYSTOLIC BLOOD PRESSURE: 132 MMHG | RESPIRATION RATE: 20 BRPM | DIASTOLIC BLOOD PRESSURE: 69 MMHG | TEMPERATURE: 96.8 F | HEART RATE: 57 BPM | BODY MASS INDEX: 33.34 KG/M2

## 2017-11-14 DIAGNOSIS — C50.912 MALIGNANT NEOPLASM OF LEFT FEMALE BREAST, UNSPECIFIED ESTROGEN RECEPTOR STATUS, UNSPECIFIED SITE OF BREAST (HCC): ICD-10-CM

## 2017-11-14 LAB
GLUCOSE BLD STRIP.AUTO-MCNC: 104 MG/DL (ref 70–110)
GLUCOSE BLD STRIP.AUTO-MCNC: 126 MG/DL (ref 70–110)

## 2017-11-14 PROCEDURE — 77030031139 HC SUT VCRL2 J&J -A

## 2017-11-14 PROCEDURE — 77030010512 HC APPL CLP LIG J&J -C

## 2017-11-14 PROCEDURE — 74011000250 HC RX REV CODE- 250

## 2017-11-14 PROCEDURE — 77030013079 HC BLNKT BAIR HGGR 3M -A

## 2017-11-14 PROCEDURE — 76210000016 HC OR PH I REC 1 TO 1.5 HR

## 2017-11-14 PROCEDURE — 77030003028 HC SUT VCRL J&J -A

## 2017-11-14 PROCEDURE — 74011250636 HC RX REV CODE- 250/636

## 2017-11-14 PROCEDURE — 77030010514 HC APPL CLP LIG COVD -B

## 2017-11-14 PROCEDURE — 76060000034 HC ANESTHESIA 1.5 TO 2 HR

## 2017-11-14 PROCEDURE — 77030008467 HC STPLR SKN COVD -B

## 2017-11-14 PROCEDURE — 74011636320 HC RX REV CODE- 636/320

## 2017-11-14 PROCEDURE — 88307 TISSUE EXAM BY PATHOLOGIST: CPT

## 2017-11-14 PROCEDURE — 77030002933 HC SUT MCRYL J&J -A

## 2017-11-14 PROCEDURE — 77030002996 HC SUT SLK J&J -A

## 2017-11-14 PROCEDURE — 77030003464 MAM PLC NDL/WIRE/CLIP/SEED BREAST LT

## 2017-11-14 PROCEDURE — 77030011640 HC PAD GRND REM COVD -A

## 2017-11-14 PROCEDURE — 77030020782 HC GWN BAIR PAWS FLX 3M -B

## 2017-11-14 PROCEDURE — 88305 TISSUE EXAM BY PATHOLOGIST: CPT

## 2017-11-14 PROCEDURE — 82962 GLUCOSE BLOOD TEST: CPT

## 2017-11-14 PROCEDURE — 74011250636 HC RX REV CODE- 250/636: Performed by: NURSE ANESTHETIST, CERTIFIED REGISTERED

## 2017-11-14 PROCEDURE — 76210000021 HC REC RM PH II 0.5 TO 1 HR

## 2017-11-14 PROCEDURE — 76010000153 HC OR TIME 1.5 TO 2 HR

## 2017-11-14 PROCEDURE — 88342 IMHCHEM/IMCYTCHM 1ST ANTB: CPT

## 2017-11-14 PROCEDURE — 74011000250 HC RX REV CODE- 250: Performed by: NURSE ANESTHETIST, CERTIFIED REGISTERED

## 2017-11-14 RX ORDER — BUPIVACAINE HYDROCHLORIDE 2.5 MG/ML
INJECTION, SOLUTION EPIDURAL; INFILTRATION; INTRACAUDAL AS NEEDED
Status: DISCONTINUED | OUTPATIENT
Start: 2017-11-14 | End: 2017-11-14 | Stop reason: HOSPADM

## 2017-11-14 RX ORDER — MIDAZOLAM HYDROCHLORIDE 1 MG/ML
INJECTION, SOLUTION INTRAMUSCULAR; INTRAVENOUS AS NEEDED
Status: DISCONTINUED | OUTPATIENT
Start: 2017-11-14 | End: 2017-11-14 | Stop reason: HOSPADM

## 2017-11-14 RX ORDER — SODIUM CHLORIDE, SODIUM LACTATE, POTASSIUM CHLORIDE, CALCIUM CHLORIDE 600; 310; 30; 20 MG/100ML; MG/100ML; MG/100ML; MG/100ML
75 INJECTION, SOLUTION INTRAVENOUS CONTINUOUS
Status: DISCONTINUED | OUTPATIENT
Start: 2017-11-14 | End: 2017-11-14 | Stop reason: HOSPADM

## 2017-11-14 RX ORDER — DEXTROSE 50 % IN WATER (D50W) INTRAVENOUS SYRINGE
25-50 AS NEEDED
Status: CANCELLED | OUTPATIENT
Start: 2017-11-14

## 2017-11-14 RX ORDER — FENTANYL CITRATE 50 UG/ML
INJECTION, SOLUTION INTRAMUSCULAR; INTRAVENOUS AS NEEDED
Status: DISCONTINUED | OUTPATIENT
Start: 2017-11-14 | End: 2017-11-14 | Stop reason: HOSPADM

## 2017-11-14 RX ORDER — CEFAZOLIN SODIUM 2 G/50ML
2 SOLUTION INTRAVENOUS ONCE
Status: COMPLETED | OUTPATIENT
Start: 2017-11-14 | End: 2017-11-14

## 2017-11-14 RX ORDER — INSULIN LISPRO 100 [IU]/ML
INJECTION, SOLUTION INTRAVENOUS; SUBCUTANEOUS ONCE
Status: CANCELLED | OUTPATIENT
Start: 2017-11-14 | End: 2017-11-15

## 2017-11-14 RX ORDER — LIDOCAINE HYDROCHLORIDE 20 MG/ML
INJECTION, SOLUTION EPIDURAL; INFILTRATION; INTRACAUDAL; PERINEURAL AS NEEDED
Status: DISCONTINUED | OUTPATIENT
Start: 2017-11-14 | End: 2017-11-14 | Stop reason: HOSPADM

## 2017-11-14 RX ORDER — ISOSULFAN BLUE 50 MG/5ML
INJECTION, SOLUTION SUBCUTANEOUS AS NEEDED
Status: DISCONTINUED | OUTPATIENT
Start: 2017-11-14 | End: 2017-11-14 | Stop reason: HOSPADM

## 2017-11-14 RX ORDER — HYDROMORPHONE HYDROCHLORIDE 2 MG/ML
0.5 INJECTION, SOLUTION INTRAMUSCULAR; INTRAVENOUS; SUBCUTANEOUS
Status: CANCELLED | OUTPATIENT
Start: 2017-11-14

## 2017-11-14 RX ORDER — INSULIN LISPRO 100 [IU]/ML
INJECTION, SOLUTION INTRAVENOUS; SUBCUTANEOUS ONCE
Status: DISCONTINUED | OUTPATIENT
Start: 2017-11-14 | End: 2017-11-14 | Stop reason: HOSPADM

## 2017-11-14 RX ORDER — MAGNESIUM SULFATE 100 %
4 CRYSTALS MISCELLANEOUS AS NEEDED
Status: CANCELLED | OUTPATIENT
Start: 2017-11-14

## 2017-11-14 RX ORDER — ONDANSETRON 2 MG/ML
INJECTION INTRAMUSCULAR; INTRAVENOUS AS NEEDED
Status: DISCONTINUED | OUTPATIENT
Start: 2017-11-14 | End: 2017-11-14 | Stop reason: HOSPADM

## 2017-11-14 RX ORDER — SODIUM CHLORIDE 0.9 % (FLUSH) 0.9 %
5-10 SYRINGE (ML) INJECTION AS NEEDED
Status: DISCONTINUED | OUTPATIENT
Start: 2017-11-14 | End: 2017-11-14 | Stop reason: HOSPADM

## 2017-11-14 RX ORDER — PROPOFOL 10 MG/ML
INJECTION, EMULSION INTRAVENOUS AS NEEDED
Status: DISCONTINUED | OUTPATIENT
Start: 2017-11-14 | End: 2017-11-14 | Stop reason: HOSPADM

## 2017-11-14 RX ORDER — ONDANSETRON 2 MG/ML
4 INJECTION INTRAMUSCULAR; INTRAVENOUS ONCE
Status: CANCELLED | OUTPATIENT
Start: 2017-11-14 | End: 2017-11-14

## 2017-11-14 RX ORDER — SODIUM CHLORIDE 0.9 % (FLUSH) 0.9 %
5-10 SYRINGE (ML) INJECTION EVERY 8 HOURS
Status: DISCONTINUED | OUTPATIENT
Start: 2017-11-14 | End: 2017-11-14 | Stop reason: HOSPADM

## 2017-11-14 RX ORDER — OXYCODONE AND ACETAMINOPHEN 5; 325 MG/1; MG/1
1 TABLET ORAL
Qty: 30 TAB | Refills: 0 | Status: SHIPPED | OUTPATIENT
Start: 2017-11-14 | End: 2018-01-09

## 2017-11-14 RX ORDER — SODIUM CHLORIDE 0.9 % (FLUSH) 0.9 %
5-10 SYRINGE (ML) INJECTION AS NEEDED
Status: CANCELLED | OUTPATIENT
Start: 2017-11-14

## 2017-11-14 RX ADMIN — FENTANYL CITRATE 50 MCG: 50 INJECTION, SOLUTION INTRAMUSCULAR; INTRAVENOUS at 12:17

## 2017-11-14 RX ADMIN — SODIUM CHLORIDE, SODIUM LACTATE, POTASSIUM CHLORIDE, AND CALCIUM CHLORIDE 75 ML/HR: 600; 310; 30; 20 INJECTION, SOLUTION INTRAVENOUS at 11:05

## 2017-11-14 RX ADMIN — PROPOFOL 200 MG: 10 INJECTION, EMULSION INTRAVENOUS at 11:49

## 2017-11-14 RX ADMIN — CEFAZOLIN SODIUM 2 G: 2 SOLUTION INTRAVENOUS at 11:43

## 2017-11-14 RX ADMIN — LIDOCAINE HYDROCHLORIDE 100 MG: 20 INJECTION, SOLUTION EPIDURAL; INFILTRATION; INTRACAUDAL; PERINEURAL at 11:49

## 2017-11-14 RX ADMIN — MIDAZOLAM HYDROCHLORIDE 2 MG: 1 INJECTION, SOLUTION INTRAMUSCULAR; INTRAVENOUS at 11:43

## 2017-11-14 RX ADMIN — FENTANYL CITRATE 50 MCG: 50 INJECTION, SOLUTION INTRAMUSCULAR; INTRAVENOUS at 13:15

## 2017-11-14 RX ADMIN — FAMOTIDINE 20 MG: 10 INJECTION INTRAVENOUS at 11:05

## 2017-11-14 RX ADMIN — ONDANSETRON 4 MG: 2 INJECTION INTRAMUSCULAR; INTRAVENOUS at 12:00

## 2017-11-14 NOTE — H&P (VIEW-ONLY)
Progress Note    Patient: Lolly L Marli  MRN: [id number]  SSN: [id number]   YOB: 1950  Age: 75 y.o. Sex: female     Chief Complaint   Patient presents with    Follow-up     echo results       HPI    Ms Alma Anderson is a 18-year-old woman with a left breast cancer who had a MI in August followed by Aria ramos. She is had a repeat echo since then and has an ejection fraction of 55% and essentially a normal echo. She has been cleared by cardiology for her lumpectomy and sentinel lymph node biopsy. She is back today to talk about that and we spent 45 minutes or more talking about the treatment, the surgery, and the risks from a cardiac standpoint. Past Medical History:   Diagnosis Date    Arthritis     Candida intertrigo 7/11/2011    Diabetes ([address]     GERD (gastroesophageal reflux disease)     Gout     Heart attack 08/12/2017    mild    Hypercholesterolemia     Hypertension      Past Surgical History:   Procedure Laterality Date    HX BREAST BIOPSY      Calcium deposits    HX DILATION AND CURETTAGE      HX HEART CATHETERIZATION  08/17/2017    HX HEENT  07/2016    cataract surgery bilaterally    HX HYSTERECTOMY      HX KNEE REPLACEMENT      Right    HX ORTHOPAEDIC      HX RADHA AND BSO      HX UROLOGICAL  1998; 1999    Bladder surgery    DE COLONOSCOPY FLX DX W/COLLJ SPEC WHEN PFRMD  7-23-09    normal;      Allergies   Allergen Reactions    Sulfa (Sulfonamide Antibiotics) Other (comments)     nightmares     Current Outpatient Prescriptions   Medication Sig Dispense Refill    potassium chloride (K-DUR, KLOR-CON) 20 mEq tablet take 1 tablet by mouth once daily 90 Tab 1    carvedilol (COREG) 6.25 mg tablet Take 1 Tab by mouth two (2) times daily (with meals). 60 Tab 6    amLODIPine (NORVASC) 5 mg tablet Take 1 Tab by mouth daily. 90 Tab 3    SITagliptin (JANUVIA) 50 mg tablet Take 1 Tab by mouth daily.  Indications: type 2 diabetes mellitus 90 Tab 2    allopurinol (ZYLOPRIM) 300 mg tablet take 1 tablet by mouth once daily 90 Tab 1    atorvastatin (LIPITOR) 80 mg tablet Take 1 Tab by mouth daily. 90 Tab 1    metFORMIN (GLUCOPHAGE) 1,000 mg tablet Take 1 Tab by mouth two (2) times daily (with meals). 180 Tab 3    fenofibrate nanocrystallized (TRICOR) 145 mg tablet Take 1 Tab by mouth daily. 90 Tab 1    valsartan-hydroCHLOROthiazide (DIOVAN-HCT) 160-25 mg per tablet take 1 tablet by mouth once daily 90 Tab 1    aspirin delayed-release 81 mg tablet Take  by mouth daily.  cyanocobalamin ER (VITAMIN B-12) 1,000 mcg tablet Take 1 tablet by mouth daily. 90 tablet 3    cholecalciferol (VITAMIN D3) 5,000 unit capsule Take 5,000 Units by mouth daily.  omeprazole (PRILOSEC OTC) 20 mg tablet Take 20 mg by mouth daily.  ferrous sulfate 325 mg (65 mg iron) tablet take 1 tablet by mouth once daily (BEFORE BREAKFAST) 90 Tab 0    docusate sodium (COLACE) 100 mg capsule Take 1 capsule three times per week as needed for constipation 60 Cap 2     Social History     Social History    Marital status:      Spouse name: N/A    Number of children: N/A    Years of education: N/A     Occupational History    Not on file.      Social History Main Topics    Smoking status: Never Smoker    Smokeless tobacco: Never Used    Alcohol use No    Drug use: No    Sexual activity: Not on file     Other Topics Concern    Not on file     Social History Narrative     Family History   Problem Relation Age of Onset    Hypertension Father     Heart Disease Father     Hypertension Brother     Diabetes Brother     Heart Disease Mother     Breast Cancer Other          Review of systems:  Patient denies any reflux, emesis, abdominal pain, change in bowel habits, hematochezia, melena, fever, weight loss, fatigue chills, dermatitis, abnormal moles, change in vision, vertigo, epistaxis, dysphagia, hoarseness, chest pain, palpitations, hypertension, edema, cough, shortness of breath, wheezing, hemoptysis, snoring, hematuria, diabetes, thyroid disease, anemia, bruising, history of blood transfusion, dizziness, headache, or fainting.     Physical Examination    Well developed well nourished female in no apparent distress  Visit Vitals    /70    Resp 18      Head: normocephalic, atraumatic  Mouth: Clear, no overt lesions, oral mucosa pink and moist  Neck: supple, no masses, no adenopathy or carotid bruits, trachea midline  Resp: clear to auscultation bilaterally, no wheeze, rhonchi or rales, excursions normal and symmetrical  Cardio: Regular rate and rhythm, no murmurs, clicks, gallops or rubs, no edema or varicosities  Abdomen: soft, nontender, nondistended, normoactive bowel sounds, no hernias, no hepatosplenomegaly,   Back: Deferred  Extremeties: warm, well-perfused, no tenderness or swelling, normal gait/station  Neuro: sensation and strength grossly intact and symmetrical  Psych: alert and oriented to person, place and time  Breast exam deferred    IMPRESSION  Known left breast cancer    PLAN  Orders Placed This Encounter    [facility]ER NDL/WIRE/CLIP/SEED BREAST LT     Standing Status:   Future     Standing Expiration Date:   12/10/2018     Order Specific Question:   Reason for Exam     Answer:   L breast cancer    NM LYMPHOSCINTIG LT BREAST     Standing Status:   Future     Standing Expiration Date:   12/10/2018     Order Specific Question:   Reason for Exam     Answer:   L breast cancer    CBC WITH AUTOMATED DIFF     Standing Status:   Future     Number of Occurrences:   1     Standing Expiration Date:   35/11/9464    METABOLIC PANEL, BASIC     Standing Status:   Future     Number of Occurrences:   1     Standing Expiration Date:   11/11/2018      lumpectomy with needle localization and lymphoscintigraphy the day before  Jj Drummond MD

## 2017-11-14 NOTE — PERIOP NOTES
Patient armband removed and shredded    Patient confirmed by two identifiers with discharge instructions prior to being provided to patient and daughter

## 2017-11-14 NOTE — DISCHARGE INSTRUCTIONS
Open Breast Biopsy: What to Expect at Home  Your Recovery  An open breast biopsy is surgery to take a sample of breast tissue. A breast biopsy may be done to check a lump found during a breast exam. Or it may be done to check an area of concern found on a mammogram or ultrasound. The breast tissue will be sent to a lab, where a doctor will look at the tissue under a microscope to check for breast cancer. Your doctor may have some answers right away. But it can take up to 1 to 2 weeks to get the final results. Your doctor will discuss the results with you. For a few days after the surgery, you will probably feel tired and have some pain. The skin around the cut (incision) may feel firm, swollen, and tender. The area may be bruised. Tenderness usually goes away in a few days, and the bruising within 2 weeks. Firmness and swelling may take 3 to 6 months to go away. The stitches in your incision may dissolve on their own, or the doctor may take them out 7 to 10 days after surgery. This care sheet gives you a general idea about how long it will take for you to recover. But each person recovers at a different pace. Follow the steps below to get better as quickly as possible. How can you care for yourself at home? Activity  ? · Rest when you feel tired. Getting enough sleep will help you recover. ? · Try to walk each day. Start by walking a little more than you did the day before. Bit by bit, increase the amount you walk. Walking boosts blood flow and helps prevent pneumonia and constipation. ? · For 2 weeks, avoid strenuous activities that put pressure on your chest or that involve vigorous movement of your upper body and arm on the side of the biopsy. Examples of these might include strenuous housework, holding an active child, jogging, or aerobic exercise. ? · For 2 weeks, avoid lifting anything that would make you strain.  This may include heavy grocery bags and milk containers, a heavy briefcase or backpack, cat litter or dog food bags, a vacuum , or a child. ? · Ask your doctor when you can drive again. ? · You will probably need to take 1 or 2 days off from work. This depends on the type of work you do and how you feel. Diet  ? · You can eat your normal diet. If your stomach is upset, try bland, low-fat foods like plain rice, broiled chicken, toast, and yogurt. Medicines  ? · Your doctor will tell you if and when you can restart your medicines. He or she will also give you instructions about taking any new medicines. ? · If you take blood thinners, such as warfarin (Coumadin), clopidogrel (Plavix), or aspirin, be sure to talk to your doctor. He or she will tell you if and when to start taking those medicines again. Make sure that you understand exactly what your doctor wants you to do. ? · Be safe with medicines. Take pain medicines exactly as directed. ¨ If the doctor gave you a prescription medicine for pain, take it as prescribed. ¨ If you are not taking a prescription pain medicine, ask your doctor if you can take an over-the-counter medicine. ? · If you think your pain medicine is making you sick to your stomach:  ¨ Take your medicine after meals (unless your doctor has told you not to). ¨ Ask your doctor for a different pain medicine. ? · If your doctor prescribed antibiotics, take them as directed. Do not stop taking them just because you feel better. You need to take the full course of antibiotics. Incision care  ? · If you have strips of tape on the incision, leave the tape on for a week or until it falls off.   ? · Wash the area daily with warm, soapy water, and pat it dry. Don't use hydrogen peroxide or alcohol, which can slow healing. You may cover the area with a gauze bandage if it weeps or rubs against clothing. Change the bandage every day. ? · Keep the area clean and dry. Other instructions  ?  · For the first 3 days after surgery, wear a supportive bra all the time, even at night. Follow-up care is a key part of your treatment and safety. Be sure to make and go to all appointments, and call your doctor if you are having problems. It's also a good idea to know your test results and keep a list of the medicines you take. When should you call for help? Call 911 anytime you think you may need emergency care. For example, call if:  ? · You passed out (lost consciousness). ? · You have chest pain, are short of breath, or cough up blood. ?Call your doctor now or seek immediate medical care if:  ? · You are sick to your stomach or cannot drink fluids. ? · You have pain that does not get better after you take pain medicine. ? · You cannot pass stools or gas. ? · You have signs of a blood clot in your leg (called a deep vein thrombosis), such as:  ¨ Pain in your calf, back of the knee, thigh, or groin. ¨ Redness or swelling in your leg. ? · You have signs of infection, such as:  ¨ Increased pain, swelling, warmth, or redness. ¨ Red streaks leading from the incision. ¨ Pus draining from the incision. ¨ A fever. ? · You have loose stitches, or your incision comes open. ? · Bright red blood has soaked through the bandage over your incision. ? Watch closely for changes in your health, and be sure to contact your doctor if:  ? · You have any problems. ? · You have new or worse swelling or pain in your arm. Where can you learn more? Go to http://sravan-arcadio.info/. Enter A813 in the search box to learn more about \"Open Breast Biopsy: What to Expect at Home. \"  Current as of: May 12, 2017  Content Version: 11.4  © 0797-5588 Healthwise, "[employer]. Care instructions adapted under license by [employer] (which disclaims liability or warranty for this information).  If you have questions about a medical condition or this instruction, always ask your healthcare professional. Henok Marquez disclaims any warranty or liability for your use of this information. Narcotic-Analgesic/Acetaminophen (Percocet, Norco, Lorcet HD, Lortab 10/325) - (By mouth)   Why this medicine is used:   Relieves pain. Contact a nurse or doctor right away if you have:  · Extreme weakness, shallow breathing, slow heartbeat  · Severe confusion, lightheadedness, dizziness, fainting  · Yellow skin or eyes, dark urine or pale stools  · Severe constipation, severe stomach pain, nausea, vomiting, loss of appetite  · Sweating or cold, clammy skin     Common side effects:  · Mild constipation, nausea, vomiting  · Sleepiness, tiredness  · Itching, rash  © 2017 [facility] Information is for End User's use only and may not be sold, redistributed or otherwise used for commercial purposes. DISCHARGE SUMMARY from Nurse    PATIENT INSTRUCTIONS:    After general anesthesia or intravenous sedation, for 24 hours or while taking prescription Narcotics:  · Limit your activities  · Do not drive and operate hazardous machinery  · Do not make important personal or business decisions  · Do  not drink alcoholic beverages  · If you have not urinated within 8 hours after discharge, please contact your surgeon on call. Report the following to your surgeon:  · Excessive pain, swelling, redness or odor of or around the surgical area  · Temperature over 100.5  · Nausea and vomiting lasting longer than 4 hours or if unable to take medications  · Any signs of decreased circulation or nerve impairment to extremity: change in color, persistent  numbness, tingling, coldness or increase pain  · Any questions    *  Please give a list of your current medications to your Primary Care Provider. *  Please update this list whenever your medications are discontinued, doses are      changed, or new medications (including over-the-counter products) are added. *  Please carry medication information at all times in case of emergency situations.     These are general instructions for a healthy lifestyle:    No smoking/ No tobacco products/ Avoid exposure to second hand smoke  Surgeon General's Warning:  Quitting smoking now greatly reduces serious risk to your health. Obesity, smoking, and sedentary lifestyle greatly increases your risk for illness    A healthy diet, regular physical exercise & weight monitoring are important for maintaining a healthy lifestyle    You may be retaining fluid if you have a history of heart failure or if you experience any of the following symptoms:  Weight gain of 3 pounds or more overnight or 5 pounds in a week, increased swelling in our hands or feet or shortness of breath while lying flat in bed. Please call your doctor as soon as you notice any of these symptoms; do not wait until your next office visit. Recognize signs and symptoms of STROKE:    F-face looks uneven    A-arms unable to move or move unevenly    S-speech slurred or non-existent    T-time-call 911 as soon as signs and symptoms begin-DO NOT go       Back to bed or wait to see if you get better-TIME IS BRAIN. Warning Signs of HEART ATTACK     Call 911 if you have these symptoms:   Chest discomfort. Most heart attacks involve discomfort in the center of the chest that lasts more than a few minutes, or that goes away and comes back. It can feel like uncomfortable pressure, squeezing, fullness, or pain.  Discomfort in other areas of the upper body. Symptoms can include pain or discomfort in one or both arms, the back, neck, jaw, or stomach.  Shortness of breath with or without chest discomfort.  Other signs may include breaking out in a cold sweat, nausea, or lightheadedness. Don't wait more than five minutes to call 911 - MINUTES MATTER! Fast action can save your life. Calling 911 is almost always the fastest way to get lifesaving treatment.  Emergency Medical Services staff can begin treatment when they arrive -- up to an hour sooner than if someone gets to the [facility] by car. The discharge information has been reviewed with the patient and spouse. The patient and spouse verbalized understanding. Discharge medications reviewed with the patient and spouse and appropriate educational materials and side effects teaching were provided.   ___________________________________________________________________________________________________________________________________

## 2017-11-14 NOTE — BRIEF OP NOTE
BRIEF OPERATIVE NOTE    Date of Procedure: 11/14/2017   Preoperative Diagnosis: MALIGNANT NEOPLASM OF LEFT FEMALE BREAST  Postoperative Diagnosis: MALIGNANT NEOPLASM OF LEFT FEMALE BREAST    Procedure(s):  LEFT BREAST LUMPECTOMY WITH NEEDLE LOCALIZATION Natasha Score LYMPH NODE BIOPSY   Surgeon(s) and Role:     * Fredy Whiteside MD - Primary         Assistant Staff:       Surgical Staff:  Circ-1: Shilpi Fritz  Scrub Tech-1: Scottie Pellet  Surg Asst-1: Savanah Morel  Event Time In   Incision Start 1217   Incision Close 1320     Anesthesia: General   Estimated Blood Loss: 0  Specimens:   ID Type Source Tests Collected by Time Destination   1 : left breast needle loc Needle Loc Breast Mass, Left  Fredy Whiteside MD 11/14/2017 1223 Pathology   2 : LEFT AXILLARY NODE CONTENTS Preservative Node  Fredy Whiteside MD 11/14/2017 1259 Pathology   3 : LEFT SENTINAL NODE Preservative Node  Fredy Whiteside MD 11/14/2017 1259 Pathology      Findings: target in specimen   Complications: 0  Implants: * No implants in log *

## 2017-11-14 NOTE — IP AVS SNAPSHOT
[place] [address] [facility] [address] [phone] Patient: Lolly Calles MRN: [id number] About your hospitalization You were admitted on:  November 14, 2017 You last received care in the:  [facility] PACU You were discharged on:  November 14, 2017 Why you were hospitalized Your primary diagnosis was:  Not on File Things You Need To Do (next 8 weeks) Follow up with Bravo Garces PA-C Phone:  [phone] Where:  [address] [facility], [address] [facility], [address] [address] Follow up with Lulu Jaimes MD  
Follow up in 2 weeks as scheduled Phone:  [phone] Where:  [address], [address] , [facility], [address] Wednesday Nov 29, 2017 Follow Up with Lulu Jaimes MD at  8:30 AM  
Where: [address] ([facility]) Discharge Orders Procedure Order Date Status Priority Quantity Spec Type Associated Dx DIET REGULAR 11/14/17 1455 Normal Routine 1  Malignant neoplasm of left female breast, unspecified estrogen receptor status, unspecified site of breast ([facility] [[id number]] A check samir indicates which time of day the medication should be taken. My Medications TAKE these medications as instructed Instructions Each Dose to Equal  
 Morning Noon Evening Bedtime  
 allopurinol 300 mg tablet Commonly known as:  Colette Anaya Your last dose was: Your next dose is:    
   
   
 take 1 tablet by mouth once daily  
     
   
   
   
  
 amLODIPine 5 mg tablet Commonly known as:  Graciela Fraction Your last dose was: Your next dose is: Take 1 Tab by mouth daily. 5 mg  
    
   
   
   
  
 aspirin delayed-release 81 mg tablet Your last dose was: Your next dose is: Take  by mouth daily. atorvastatin 80 mg tablet Commonly known as:  LIPITOR Your last dose was: Your next dose is: Take 1 Tab by mouth daily. 80 mg  
    
   
   
   
  
 carvedilol 6.25 mg tablet Commonly known as:  Hasrona Camacho Your last dose was: Your next dose is: Take 1 Tab by mouth two (2) times daily (with meals). 6.25 mg  
    
   
   
   
  
 cholecalciferol 5,000 unit capsule Commonly known as:  VITAMIN D3 Your last dose was: Your next dose is: Take 5,000 Units by mouth daily. 5000 Units  
    
   
   
   
  
 cyanocobalamin ER 1,000 mcg tablet Commonly known as:  VITAMIN B-12 Your last dose was: Your next dose is: Take 1 tablet by mouth daily. 1000 mcg  
    
   
   
   
  
 docusate sodium 100 mg capsule Commonly known as:  Radha French Your last dose was: Your next dose is: Take 1 capsule three times per week as needed for constipation  
     
   
   
   
  
 fenofibrate nanocrystallized 145 mg tablet Commonly known as:  [employer] Your last dose was: Your next dose is: Take 1 Tab by mouth daily. 145 mg  
    
   
   
   
  
 metFORMIN 1,000 mg tablet Commonly known as:  GLUCOPHAGE Your last dose was: Your next dose is: Take 1 Tab by mouth two (2) times daily (with meals). 1000 mg  
    
   
   
   
  
 oxyCODONE-acetaminophen 5-325 mg per tablet Commonly known as:  PERCOCET Your last dose was: Your next dose is: Take 1 Tab by mouth every six (6) hours as needed for Pain. Max Daily Amount: 4 Tabs. 1 Tab  
    
   
   
   
  
 potassium chloride 20 mEq tablet Commonly known as:  K-DUR, KLOR-CON Your last dose was: Your next dose is:    
   
   
 take 1 tablet by mouth once daily PriLOSEC OTC 20 mg tablet Generic drug:  omeprazole Your last dose was: Your next dose is: Take 20 mg by mouth daily. 20 mg SITagliptin 50 mg tablet Commonly known as:  Rocael Cooper Your last dose was: Your next dose is: Take 1 Tab by mouth daily. Indications: type 2 diabetes mellitus 50 mg  
    
   
   
   
  
 valsartan-hydroCHLOROthiazide 160-25 mg per tablet Commonly known as:  DIOVAN-HCT Your last dose was: Your next dose is:    
   
   
 take 1 tablet by mouth once daily Where to Get Your Medications Information on where to get these meds will be given to you by the nurse or doctor. ! Ask your nurse or doctor about these medications  
  oxyCODONE-acetaminophen 5-325 mg per tablet Discharge Instructions Open Breast Biopsy: What to Expect at Home Your Recovery An open breast biopsy is surgery to take a sample of breast tissue. A breast biopsy may be done to check a lump found during a breast exam. Or it may be done to check an area of concern found on a mammogram or ultrasound. The breast tissue will be sent to a lab, where a doctor will look at the tissue under a microscope to check for breast cancer. Your doctor may have some answers right away. But it can take up to 1 to 2 weeks to get the final results. Your doctor will discuss the results with you. For a few days after the surgery, you will probably feel tired and have some pain. The skin around the cut (incision) may feel firm, swollen, and tender. The area may be bruised. Tenderness usually goes away in a few days, and the bruising within 2 weeks. Firmness and swelling may take 3 to 6 months to go away. The stitches in your incision may dissolve on their own, or the doctor may take them out 7 to 10 days after surgery. This care sheet gives you a general idea about how long it will take for you to recover. But each person recovers at a different pace.  Follow the steps below to get better as quickly as possible. How can you care for yourself at home? Activity ? · Rest when you feel tired. Getting enough sleep will help you recover. ? · Try to walk each day. Start by walking a little more than you did the day before. Bit by bit, increase the amount you walk. Walking boosts blood flow and helps prevent pneumonia and constipation. ? · For 2 weeks, avoid strenuous activities that put pressure on your chest or that involve vigorous movement of your upper body and arm on the side of the biopsy. Examples of these might include strenuous housework, holding an active child, jogging, or aerobic exercise. ? · For 2 weeks, avoid lifting anything that would make you strain. This may include heavy grocery bags and milk containers, a heavy briefcase or backpack, cat litter or dog food bags, a vacuum , or a child. ? · Ask your doctor when you can drive again. ? · You will probably need to take 1 or 2 days off from work. This depends on the type of work you do and how you feel. Diet ? · You can eat your normal diet. If your stomach is upset, try bland, low-fat foods like plain rice, broiled chicken, toast, and yogurt. Medicines ? · Your doctor will tell you if and when you can restart your medicines. He or she will also give you instructions about taking any new medicines. ? · If you take blood thinners, such as warfarin (Coumadin), clopidogrel (Plavix), or aspirin, be sure to talk to your doctor. He or she will tell you if and when to start taking those medicines again. Make sure that you understand exactly what your doctor wants you to do. ? · Be safe with medicines. Take pain medicines exactly as directed. ¨ If the doctor gave you a prescription medicine for pain, take it as prescribed. ¨ If you are not taking a prescription pain medicine, ask your doctor if you can take an over-the-counter medicine. ? · If you think your pain medicine is making you sick to your stomach: 
¨ Take your medicine after meals (unless your doctor has told you not to). ¨ Ask your doctor for a different pain medicine. ? · If your doctor prescribed antibiotics, take them as directed. Do not stop taking them just because you feel better. You need to take the full course of antibiotics. Incision care ? · If you have strips of tape on the incision, leave the tape on for a week or until it falls off.  
? · Wash the area daily with warm, soapy water, and pat it dry. Don't use hydrogen peroxide or alcohol, which can slow healing. You may cover the area with a gauze bandage if it weeps or rubs against clothing. Change the bandage every day. ? · Keep the area clean and dry. Other instructions ? · For the first 3 days after surgery, wear a supportive bra all the time, even at night. Follow-up care is a key part of your treatment and safety. Be sure to make and go to all appointments, and call your doctor if you are having problems. It's also a good idea to know your test results and keep a list of the medicines you take. When should you call for help? Call 911 anytime you think you may need emergency care. For example, call if: 
? · You passed out (lost consciousness). ? · You have chest pain, are short of breath, or cough up blood. ?Call your doctor now or seek immediate medical care if: 
? · You are sick to your stomach or cannot drink fluids. ? · You have pain that does not get better after you take pain medicine. ? · You cannot pass stools or gas. ? · You have signs of a blood clot in your leg (called a deep vein thrombosis), such as: 
¨ Pain in your calf, back of the knee, thigh, or groin. ¨ Redness or swelling in your leg. ? · You have signs of infection, such as: 
¨ Increased pain, swelling, warmth, or redness. ¨ Red streaks leading from the incision. ¨ Pus draining from the incision. ¨ A fever. ? · You have loose stitches, or your incision comes open. ? · Bright red blood has soaked through the bandage over your incision. ? Watch closely for changes in your health, and be sure to contact your doctor if: 
? · You have any problems. ? · You have new or worse swelling or pain in your arm. Where can you learn more? Go to http://sravan-arcadio.info/. Enter W804 in the search box to learn more about \"Open Breast Biopsy: What to Expect at Home. \" Current as of: May 12, 2017 Content Version: 11.4 © 5419-9911 [employer]. Care instructions adapted under license by [employer] (which disclaims liability or warranty for this information). If you have questions about a medical condition or this instruction, always ask your healthcare professional. [address] any warranty or liability for your use of this information. Narcotic-Analgesic/Acetaminophen (Percocet, Norco, Lorcet HD, Lortab 10/325) - (By mouth) Why this medicine is used:  
Relieves pain. Contact a nurse or doctor right away if you have: 
· Extreme weakness, shallow breathing, slow heartbeat · Severe confusion, lightheadedness, dizziness, fainting · Yellow skin or eyes, dark urine or pale stools · Severe constipation, severe stomach pain, nausea, vomiting, loss of appetite · Sweating or cold, clammy skin Common side effects: · Mild constipation, nausea, vomiting · Sleepiness, tiredness · Itching, rash © 2017 [address]  Information is for End User's use only and may not be sold, redistributed or otherwise used for commercial purposes. DISCHARGE SUMMARY from Nurse PATIENT INSTRUCTIONS: 
 
 
F-face looks uneven A-arms unable to move or move unevenly S-speech slurred or non-existent T-time-call 911 as soon as signs and symptoms begin-DO NOT go  
 Back to bed or wait to see if you get better-TIME IS BRAIN. Warning Signs of HEART ATTACK Call 911 if you have these symptoms: 
? Chest discomfort. Most heart attacks involve discomfort in the center of the chest that lasts more than a few minutes, or that goes away and comes back. It can feel like uncomfortable pressure, squeezing, fullness, or pain. ? Discomfort in other areas of the upper body. Symptoms can include pain or discomfort in one or both arms, the back, neck, jaw, or stomach. ? Shortness of breath with or without chest discomfort. ? Other signs may include breaking out in a cold sweat, nausea, or lightheadedness. Don't wait more than five minutes to call [address]! Fast action can save your life. Calling 911 is almost always the fastest way to get lifesaving treatment. Emergency Medical Services staff can begin treatment when they arrive  up to an hour sooner than if someone gets to the hospital by car. The discharge information has been reviewed with the patient and spouse. The patient and spouse verbalized understanding. Discharge medications reviewed with the patient and spouse and appropriate educational materials and side effects teaching were provided. ___________________________________________________________________________________________________________________________________ Introducing [facility]! [facility] introduces LensAR patient portal. Now you can access parts of your medical record, email your doctor's office, and request medication refills online. 1. In your internet browser, go to [email or web address] 2. Click on the First Time User? Click Here link in the Sign In box. You will see the New Member Sign Up page. 3. Enter your LensAR Access Code exactly as it appears below. You will not need to use this code after youve completed the sign-up process.  If you do not sign up before the expiration date, you must request a new code. · IfOnly Access Code: [email or web address] Expires: 12/12/2017  4:40 PM 
 
4. Enter the last four digits of your Social Security Number (xxxx) and Date of Birth (mm/dd/yyyy) as indicated and click Submit. You will be taken to the next sign-up page. 5. Create a IfOnly ID. This will be your IfOnly login ID and cannot be changed, so think of one that is secure and easy to remember. 6. Create a IfOnly password. You can change your password at any time. 7. Enter your Password Reset Question and Answer. This can be used at a later time if you forget your password. 8. Enter your e-mail address. You will receive e-mail notification when new information is available in [address] 9. Click Sign Up. You can now view and download portions of your medical record. 10. Click the Download Summary menu link to download a portable copy of your medical information. If you have questions, please visit the Frequently Asked Questions section of the IfOnly website. Remember, IfOnly is NOT to be used for urgent needs. For medical emergencies, dial 911. Now available from your iPhone and Android! Providers Seen During Your Hospitalization Provider Specialty Primary office phone Santa Zapata MD Surgery [phone] Your Primary Care Physician (PCP) Primary Care Physician Office Phone Office Fax Kelsey Mendieta [phone] You are allergic to the following Allergen Reactions Sulfa (Sulfonamide Antibiotics) Other (comments)  
 nightmares Recent Documentation Height Weight BMI OB Status Smoking Status 1.651 m 90.8 kg 33.3 kg/m2 Hysterectomy Never Smoker Emergency Contacts Name Discharge Info Relation Home Work Mobile Santiago Calles CAREGIVER [3] Spouse [3] [phone] Patient Belongings The following personal items are in your possession at time of discharge: 
  Dental Appliances: Uppers  Visual Aid: None      Home Medications: None   Jewelry: None  Clothing: Footwear, Shirt, Undergarments, Pants, Jacket/Coat, Socks    Other Valuables: None Please provide this summary of care documentation to your next provider. Signatures-by signing, you are acknowledging that this After Visit Summary has been reviewed with you and you have received a copy. Patient Signature:  ____________________________________________________________ Date:  ____________________________________________________________  
  
Shelley Usha Provider Signature:  ____________________________________________________________ Date:  ____________________________________________________________

## 2017-11-14 NOTE — ANESTHESIA POSTPROCEDURE EVALUATION
Post-Anesthesia Evaluation and Assessment    Patient: Lolly Calles MRN: [id number]  SSN: [id number]    YOB: 1950  Age: 75 y.o. Sex: female     VS from flow sheet    Cardiovascular Function/Vital Signs  Visit Vitals    /69 (BP 1 Location: Right arm, BP Patient Position: At rest)    Pulse (!) 57    Temp 36 °C (96.8 °F)    Resp 20    Ht 5' 5\" (1.651 m)    Wt 90.8 kg (200 lb 2 oz)    SpO2 94%    BMI 33.3 kg/m2       Patient is status post general anesthesia for Procedure(s):  LEFT BREAST LUMPECTOMY WITH NEEDLE LOCALIZATION /SENTINEL LYMPH NODE BIOPSY . Nausea/Vomiting: None    Postoperative hydration reviewed and adequate. Pain:  Pain Scale 1: Numeric (0 - 10) (11/14/17 1518)  Pain Intensity 1: 0 (11/14/17 1518)   Managed    Neurological Status:   Neuro (WDL): Within Defined Limits (11/14/17 1328)   At baseline    Mental Status and Level of Consciousness: Arousable    Pulmonary Status:   O2 Device: Room air (11/14/17 1518)   Adequate oxygenation and airway patent    Complications related to anesthesia: None    Post-anesthesia assessment completed.  No concerns    Signed By: Mallika Clark MD     November 14, 2017

## 2017-11-14 NOTE — ANESTHESIA PREPROCEDURE EVALUATION
Anesthetic History   No history of anesthetic complications            Review of Systems / Medical History  Patient summary reviewed and pertinent labs reviewed    Pulmonary  Within defined limits                 Neuro/Psych   Within defined limits           Cardiovascular    Hypertension          Past MI and CAD    Exercise tolerance: >4 METS     GI/Hepatic/Renal     GERD           Endo/Other    Diabetes: type 2  Hypothyroidism  Obesity and arthritis     Other Findings   Comments:   Risk Factors for Postoperative nausea/vomiting:       History of postoperative nausea/vomiting? NO       Female? YES       Motion sickness? NO       Intended opioid administration for postoperative analgesia? YES      Smoking Abstinence  Current Smoker? NO  Elective Surgery? YES  Seen preoperatively by anesthesiologist or proxy prior to day of surgery? YES  Pt abstained from smoking 24 hours prior to anesthesia?  N/A           Physical Exam    Airway  Mallampati: II  TM Distance: 4 - 6 cm  Neck ROM: normal range of motion   Mouth opening: Diminished (comment)     Cardiovascular    Rhythm: irregular  Rate: normal         Dental    Dentition: Upper partial plate and Poor dentition     Pulmonary  Breath sounds clear to auscultation               Abdominal  GI exam deferred       Other Findings            Anesthetic Plan    ASA: 3  Anesthesia type: general          Induction: Intravenous  Anesthetic plan and risks discussed with: Patient

## 2017-11-14 NOTE — INTERVAL H&P NOTE
H&P Update:  Lolly L Marli was seen and examined. History and physical has been reviewed. The patient has been examined.  There have been no significant clinical changes since the completion of the originally dated History and Physical.    Signed By: Jacinta Khan MD     November 14, 2017 11:12 AM

## 2017-11-15 NOTE — OP NOTES
[address]    Name:  Bessy Montez  MR#:  [id number]  :  1950  Account #:  [de-identified]  Date of Adm:  2017  Date of Surgery:  2017      PREOPERATIVE DIAGNOSIS: Left breast cancer. POSTOPERATIVE DIAGNOSIS: Left breast cancer. PROCEDURES PERFORMED: Needle localized lumpectomy with  sentinel lymph node biopsy. SURGEON: Brayan Philipr, MD    ASSISTANT: .    ESTIMATED BLOOD LOSS: Minimal.    SPECIMENS REMOVED: Left breast tissue and left axillary sentinel  lymph node. ANESTHESIA: General.    COMPLICATIONS: None. INDICATIONS: The patient is a 57-year-old woman who presents with  a known left breast cancer biopsy by Radiology. One day prior to her  surgery, she had a nuclear medicine lymphoscintigraphy showing a  single sentinel lymph node. The day of surgery she came, was sent to  Radiology and a needle localization of her left breast clip was  performed. She then came to the preop holding area. DESCRIPTION OF PROCEDURE: After appropriate antibiotics and  sequential compression stockings, the patient was taken to the  operating room, placed in supine position on the OR table. After  adequate general anesthesia, her left breast and chest were prepped  and draped to CDC standard. Lymphazurin blue was then injected  around Sappey's plexus near her nipple and a periareolar incision was  created. A moderate size area at the 2 o'clock position to her nipple  was removed including the wire and it was marked for orientation with  suture. This was sent for specimen mammography and the target was  indeed inside the specimen. Copious irrigation around the wound was  performed and hemostasis was achieved with the Bovie. The wound  was then closed with 3-0 Vicryl and 4-0 Monocryl. Attention was then turned to her axilla and using the gamma probe, an  area of approximately 31 counts per second was localized.  An axillary  incision was created and Bovie electrocautery was used to take down  the subcutaneous tissue. The axilla was entered bluntly and using the  gamma probe, the area of high counts per second was found. A hot  blue node was found in this area and it was excised. It had  approximately 60 counts per second once excised, This was indeed  the sentinel lymph node. Copious irrigation of the axillary wound was  performed and it was closed with 3-0 Vicryl and 4-0 Monocryl. Steri-  Strips and sterile dressings were applied to both wounds and 0.25%  Marcaine without epinephrine was used around the wounds. She  tolerated the procedure well and was taken to recovery in stable  condition.         MD Jose Roberto Serrano / TAMMY  D:  11/14/2017   15:03  T:  11/14/2017   23:29  Job #:  [id number]

## 2017-11-29 ENCOUNTER — OFFICE VISIT (OUTPATIENT)
Dept: SURGERY | Age: 67
End: 2017-11-29

## 2017-11-29 VITALS — SYSTOLIC BLOOD PRESSURE: 130 MMHG | DIASTOLIC BLOOD PRESSURE: 70 MMHG | RESPIRATION RATE: 16 BRPM

## 2017-11-29 DIAGNOSIS — C50.212 MALIGNANT NEOPLASM OF UPPER-INNER QUADRANT OF LEFT FEMALE BREAST, UNSPECIFIED ESTROGEN RECEPTOR STATUS (HCC): Primary | ICD-10-CM

## 2017-11-29 NOTE — PROGRESS NOTES
Progress Note    Patient: Lolly LALA Marli  MRN: [id number]  SSN: [id number]   YOB: 1950  Age: 75 y.o. Sex: female     Chief Complaint   Patient presents with    Post OP Follow Up     left lumectomy 11/14       HPI    Ms. Vernon Plummer is a 57-year-old woman status post a left lumpectomy and sentinel lymph node biopsy for a T1a N(mi) MX lesion. She is doing well postop. Her wounds look good. I am surprised she had micro metastases to 1 of  her sentinel nodes  We discussed this at length. I will get her appointments with medical oncology and radiation oncology. I have answered all her questions. We discussed Oncotype DX.     Past Medical History:   Diagnosis Date    Arthritis     Candida intertrigo 7/11/2011    Diabetes ([facility] [address]     GERD (gastroesophageal reflux disease)     Gout     Heart attack 08/12/2017    mild (tx'd medically)    Hypercholesterolemia     Hypertension      Past Surgical History:   Procedure Laterality Date    BX/REMV,LYMPH NODE,DEEP AXILL N/A 11/14/2017    Dr. Rubina Hernández      Calcium deposits    HX DILATION AND CURETTAGE      HX HEART CATHETERIZATION  08/17/2017    no blockages    HX HEENT  07/2016    cataract surgery bilaterally    HX HYSTERECTOMY      HX KNEE REPLACEMENT      Right    HX MASTECTOMY Left 11/14/2017    LEFT BREAST LUMPECTOMY WITH NEEDLE LOCALIZATION Clemencia Pleasure LYMPH NODE BIOPSY  performed by Gomez Varela MD at [address] [place] [address] HX ORTHOPAEDIC      HX RADHA AND BSO      HX [facility]; 1999    Bladder surgery    LA COLONOSCOPY FLX DX W/COLLJ SPEC WHEN PFRMD  7-23-09    normal;     LA MASTECTOMY, PARTIAL Left 11/14/2017    Dr. Dash Revelucius     Allergies   Allergen Reactions    Sulfa (Sulfonamide Antibiotics) Other (comments)     nightmares     Current Outpatient Prescriptions   Medication Sig Dispense Refill    potassium chloride (K-DUR, KLOR-CON) 20 mEq tablet take 1 tablet by mouth once daily 90 Tab 1    carvedilol (COREG) 6.25 mg tablet Take 1 Tab by mouth two (2) times daily (with meals). 60 Tab 6    amLODIPine (NORVASC) 5 mg tablet Take 1 Tab by mouth daily. 90 Tab 3    SITagliptin (JANUVIA) 50 mg tablet Take 1 Tab by mouth daily. Indications: type 2 diabetes mellitus 90 Tab 2    allopurinol (ZYLOPRIM) 300 mg tablet take 1 tablet by mouth once daily 90 Tab 1    atorvastatin (LIPITOR) 80 mg tablet Take 1 Tab by mouth daily. 90 Tab 1    metFORMIN (GLUCOPHAGE) 1,000 mg tablet Take 1 Tab by mouth two (2) times daily (with meals). 180 Tab 3    fenofibrate nanocrystallized (TRICOR) 145 mg tablet Take 1 Tab by mouth daily. 90 Tab 1    valsartan-hydroCHLOROthiazide (DIOVAN-HCT) 160-25 mg per tablet take 1 tablet by mouth once daily 90 Tab 1    docusate sodium (COLACE) 100 mg capsule Take 1 capsule three times per week as needed for constipation 60 Cap 2    aspirin delayed-release 81 mg tablet Take  by mouth daily.  cyanocobalamin ER (VITAMIN B-12) 1,000 mcg tablet Take 1 tablet by mouth daily. 90 tablet 3    cholecalciferol (VITAMIN D3) 5,000 unit capsule Take 5,000 Units by mouth daily.  omeprazole (PRILOSEC OTC) 20 mg tablet Take 20 mg by mouth daily.  oxyCODONE-acetaminophen (PERCOCET) 5-325 mg per tablet Take 1 Tab by mouth every six (6) hours as needed for Pain. Max Daily Amount: 4 Tabs. 30 Tab 0     Social History     Social History    Marital status:      Spouse name: N/A    Number of children: N/A    Years of education: N/A     Occupational History    Not on file.      Social History Main Topics    Smoking status: Never Smoker    Smokeless tobacco: Never Used    Alcohol use No    Drug use: No    Sexual activity: Not on file     Other Topics Concern    Not on file     Social History Narrative     Family History   Problem Relation Age of Onset    Hypertension Father     Heart Disease Father     Hypertension Brother     Diabetes Brother     Heart Disease Mother     Breast Cancer Other Review of systems:  Patient denies any reflux, emesis, abdominal pain, change in bowel habits, hematochezia, melena, fever, weight loss, fatigue chills, dermatitis, abnormal moles, change in vision, vertigo, epistaxis, dysphagia, hoarseness, chest pain, palpitations, hypertension, edema, cough, shortness of breath, wheezing, hemoptysis, snoring, hematuria, diabetes, thyroid disease, anemia, bruising, history of blood transfusion, dizziness, headache, or fainting. Physical Examination    Well developed well nourished female in no apparent distress  Visit Vitals    /70    Resp 16      Head: normocephalic, atraumatic  Mouth: Clear, no overt lesions, oral mucosa pink and moist  Neck: supple, no masses, no adenopathy or carotid bruits, trachea midline  Resp: clear to auscultation bilaterally, no wheeze, rhonchi or rales, excursions normal and symmetrical  Cardio: Regular rate and rhythm, no murmurs, clicks, gallops or rubs, no edema or varicosities  Abdomen: soft, nontender, nondistended, normoactive bowel sounds, no hernias, no hepatosplenomegaly,   Back: Deferred  Extremeties: warm, well-perfused, no tenderness or swelling, normal gait/station  Neuro: sensation and strength grossly intact and symmetrical  Psych: alert and oriented to person, place and time  Breast exam left breast wound well-healed as is her axillary lymph node wound    IMPRESSION  Early stage breast cancer with micro metastases to one sentinel lymph node    PLAN  No orders of the defined types were placed in this encounter.     Radiation oncology and medical oncology appointments follow-up in 3 weeks  Meliton Vivar MD

## 2017-12-07 ENCOUNTER — HOSPITAL ENCOUNTER (OUTPATIENT)
Dept: LAB | Age: 67
Discharge: HOME | End: 2017-12-07

## 2017-12-14 ENCOUNTER — HOSPITAL ENCOUNTER (OUTPATIENT)
Dept: RADIATION THERAPY | Age: 67
Discharge: HOME | End: 2017-12-14
Payer: MEDICARE

## 2017-12-14 PROCEDURE — 99211 OFF/OP EST MAY X REQ PHY/QHP: CPT

## 2017-12-15 DIAGNOSIS — E78.5 HYPERLIPIDEMIA, UNSPECIFIED HYPERLIPIDEMIA TYPE: ICD-10-CM

## 2017-12-15 RX ORDER — FENOFIBRATE 145 MG/1
145 TABLET, COATED ORAL DAILY
Qty: 90 TAB | Refills: 1 | Status: SHIPPED | OUTPATIENT
Start: 2017-12-15 | End: 2018-06-14 | Stop reason: SDUPTHER

## 2017-12-15 NOTE — TELEPHONE ENCOUNTER
LOV 9- NOV 1- 9/5/2017  7:41 PM - [facility], [facility]   Component Results   Component Value Flag Ref Range Units Status   LIPID PROFILE         Final   Cholesterol, total 150  <200 MG/DL Final   Triglyceride 136  <150 MG/DL Final   Comment:   The drugs N-acetylcysteine (NAC) and   Metamiszole have been found to cause falsely   low results in this chemical assay. Please   be sure to submit blood samples obtained   BEFORE administration of either of these   drugs to assure correct results.       HDL Cholesterol 51  40 - 60 MG/DL Final   LDL, calculated 71.8  0 - 100 MG/DL Final   VLDL, calculated 27.2   MG/DL Final   CHOL/HDL Ratio 2.9  0 - 5.0   Final

## 2017-12-21 ENCOUNTER — TELEPHONE (OUTPATIENT)
Dept: FAMILY MEDICINE CLINIC | Age: 67
End: 2017-12-21

## 2018-01-03 ENCOUNTER — OFFICE VISIT (OUTPATIENT)
Dept: SURGERY | Age: 68
End: 2018-01-03

## 2018-01-03 VITALS — SYSTOLIC BLOOD PRESSURE: 130 MMHG | DIASTOLIC BLOOD PRESSURE: 64 MMHG | RESPIRATION RATE: 18 BRPM

## 2018-01-03 DIAGNOSIS — Z85.3 PERSONAL HISTORY OF BREAST CANCER: Primary | ICD-10-CM

## 2018-01-03 NOTE — PROGRESS NOTES
Progress Note    Patient: Lolly LALA Marli  MRN: [id number]  SSN: [id number]   YOB: 1950  Age: 75 y.o. Sex: female     Chief Complaint   Patient presents with    Follow-up     left lumpectomy        HPI    Ms. Melia Pickett is a 54-year-old woman who is a history of a left breast lumpectomy sentinel lymph node biopsy in November 2017. He had a T1cN1(mi)M0 tumor which was ER AK positive and HER-2 equivocal.  She has had a Oncotype DX sent. She is back today for a wound check and her wound looks great. She is been to see radiation oncology and they are waiting on whether she is going to get chemotherapy or not. Her Oncotype is not back    Past Medical History:   Diagnosis Date    Arthritis     Candida intertrigo 7/11/2011    Diabetes ([address]     GERD (gastroesophageal reflux disease)     Gout     Heart attack 08/12/2017    mild (tx'd medically)    Hypercholesterolemia     Hypertension      Past Surgical History:   Procedure Laterality Date    BX/REMV,LYMPH NODE,DEEP AXILL N/A 11/14/2017    Dr. Willson Goods      Calcium deposits    HX DILATION AND CURETTAGE      HX HEART CATHETERIZATION  08/17/2017    no blockages    HX HEENT  07/2016    cataract surgery bilaterally    HX HYSTERECTOMY      HX KNEE REPLACEMENT      Right    HX MASTECTOMY Left 11/14/2017    LEFT BREAST LUMPECTOMY WITH NEEDLE LOCALIZATION Miguel Car LYMPH NODE BIOPSY  performed by Nathan Shannon MD at [address] [place] [address] HX ORTHOPAEDIC      HX RADHA AND BSO      [facility]; 1999    Bladder surgery    AK COLONOSCOPY FLX DX W/COLLJ SPEC WHEN PFRMD  7-23-09    normal;     AK MASTECTOMY, PARTIAL Left 11/14/2017    Dr. Debbie Case     Allergies   Allergen Reactions    Sulfa (Sulfonamide Antibiotics) Other (comments)     nightmares     Current Outpatient Prescriptions   Medication Sig Dispense Refill    fenofibrate nanocrystallized (TRICOR) 145 mg tablet Take 1 Tab by mouth daily.  90 Tab 1    oxyCODONE-acetaminophen (PERCOCET) 5-325 mg per tablet Take 1 Tab by mouth every six (6) hours as needed for Pain. Max Daily Amount: 4 Tabs. 30 Tab 0    potassium chloride (K-DUR, KLOR-CON) 20 mEq tablet take 1 tablet by mouth once daily 90 Tab 1    carvedilol (COREG) 6.25 mg tablet Take 1 Tab by mouth two (2) times daily (with meals). 60 Tab 6    amLODIPine (NORVASC) 5 mg tablet Take 1 Tab by mouth daily. 90 Tab 3    SITagliptin (JANUVIA) 50 mg tablet Take 1 Tab by mouth daily. Indications: type 2 diabetes mellitus 90 Tab 2    allopurinol (ZYLOPRIM) 300 mg tablet take 1 tablet by mouth once daily 90 Tab 1    atorvastatin (LIPITOR) 80 mg tablet Take 1 Tab by mouth daily. 90 Tab 1    metFORMIN (GLUCOPHAGE) 1,000 mg tablet Take 1 Tab by mouth two (2) times daily (with meals). 180 Tab 3    valsartan-hydroCHLOROthiazide (DIOVAN-HCT) 160-25 mg per tablet take 1 tablet by mouth once daily 90 Tab 1    docusate sodium (COLACE) 100 mg capsule Take 1 capsule three times per week as needed for constipation 60 Cap 2    aspirin delayed-release 81 mg tablet Take  by mouth daily.  cyanocobalamin ER (VITAMIN B-12) 1,000 mcg tablet Take 1 tablet by mouth daily. 90 tablet 3    cholecalciferol (VITAMIN D3) 5,000 unit capsule Take 5,000 Units by mouth daily.  omeprazole (PRILOSEC OTC) 20 mg tablet Take 20 mg by mouth daily. Social History     Social History    Marital status:      Spouse name: N/A    Number of children: N/A    Years of education: N/A     Occupational History    Not on file.      Social History Main Topics    Smoking status: Never Smoker    Smokeless tobacco: Never Used    Alcohol use No    Drug use: No    Sexual activity: Not on file     Other Topics Concern    Not on file     Social History Narrative     Family History   Problem Relation Age of Onset    Hypertension Father     Heart Disease Father     Hypertension Brother     Diabetes Brother     Heart Disease Mother  Breast Cancer Other          Review of systems:  Patient denies any reflux, emesis, abdominal pain, change in bowel habits, hematochezia, melena, fever, weight loss, fatigue chills, dermatitis, abnormal moles, change in vision, vertigo, epistaxis, dysphagia, hoarseness, chest pain, palpitations, hypertension, edema, cough, shortness of breath, wheezing, hemoptysis, snoring, hematuria, diabetes, thyroid disease, anemia, bruising, history of blood transfusion, dizziness, headache, or fainting. Physical Examination    Well developed well nourished female in no apparent distress  Visit Vitals    /64    Resp 18      Head: normocephalic, atraumatic  Mouth: Clear, no overt lesions, oral mucosa pink and moist  Neck: supple, no masses, no adenopathy or carotid bruits, trachea midline  Resp: clear to auscultation bilaterally, no wheeze, rhonchi or rales, excursions normal and symmetrical  Cardio: Regular rate and rhythm, no murmurs, clicks, gallops or rubs, no edema or varicosities  Abdomen: soft, nontender, nondistended, normoactive bowel sounds, no hernias, no hepatosplenomegaly,   Back: Deferred  Extremeties: warm, well-perfused, no tenderness or swelling, normal gait/station  Neuro: sensation and strength grossly intact and symmetrical  Psych: alert and oriented to person, place and time  Breast exam well-healed breast incisions    IMPRESSION  Early stage left breast cancer    PLAN  No orders of the defined types were placed in this encounter.     Follow-up after medical and radiation on appointments  Velma Landeros MD

## 2018-01-09 ENCOUNTER — OFFICE VISIT (OUTPATIENT)
Dept: FAMILY MEDICINE CLINIC | Age: 68
End: 2018-01-09

## 2018-01-09 VITALS
BODY MASS INDEX: 33.95 KG/M2 | OXYGEN SATURATION: 96 % | TEMPERATURE: 98.8 F | HEART RATE: 75 BPM | HEIGHT: 65 IN | SYSTOLIC BLOOD PRESSURE: 159 MMHG | WEIGHT: 203.8 LBS | DIASTOLIC BLOOD PRESSURE: 82 MMHG | RESPIRATION RATE: 18 BRPM

## 2018-01-09 DIAGNOSIS — E78.5 HYPERLIPIDEMIA, UNSPECIFIED HYPERLIPIDEMIA TYPE: ICD-10-CM

## 2018-01-09 DIAGNOSIS — N89.8 VAGINAL IRRITATION: Primary | ICD-10-CM

## 2018-01-09 DIAGNOSIS — J06.9 UPPER RESPIRATORY TRACT INFECTION, UNSPECIFIED TYPE: ICD-10-CM

## 2018-01-09 DIAGNOSIS — R05.9 COUGH: ICD-10-CM

## 2018-01-09 PROBLEM — E11.21 TYPE 2 DIABETES MELLITUS WITH NEPHROPATHY (HCC): Status: ACTIVE | Noted: 2018-01-09

## 2018-01-09 LAB
BILIRUB UR QL STRIP: NEGATIVE
GLUCOSE UR-MCNC: NEGATIVE MG/DL
KETONES P FAST UR STRIP-MCNC: NEGATIVE MG/DL
PH UR STRIP: 7.5 [PH] (ref 4.6–8)
PROT UR QL STRIP: NEGATIVE
SP GR UR STRIP: 1.01 (ref 1–1.03)
UA UROBILINOGEN AMB POC: NORMAL (ref 0.2–1)
URINALYSIS CLARITY POC: CLEAR
URINALYSIS COLOR POC: YELLOW
URINE BLOOD POC: NEGATIVE
URINE LEUKOCYTES POC: NORMAL
URINE NITRITES POC: NEGATIVE

## 2018-01-09 RX ORDER — ALBUTEROL SULFATE 90 UG/1
2 AEROSOL, METERED RESPIRATORY (INHALATION)
Qty: 1 INHALER | Refills: 0 | Status: SHIPPED | OUTPATIENT
Start: 2018-01-09 | End: 2018-01-24

## 2018-01-09 RX ORDER — CLOTRIMAZOLE AND BETAMETHASONE DIPROPIONATE 10; .64 MG/G; MG/G
CREAM TOPICAL
Qty: 15 G | Refills: 0 | Status: SHIPPED | OUTPATIENT
Start: 2018-01-09 | End: 2018-01-24

## 2018-01-09 NOTE — PROGRESS NOTES
Lolly Nicholas is a  75 y.o. female presents today for office visit for possible UTI x 2 days, just irritation and a cold like symptons x 1 week      1. Have you been to the ER, urgent care clinic or hospitalized since your last visit? YES 11-      2. Have you seen or consulted any other health care providers outside of the [facility] since your last visit (Include any pap smears or colon screening)?  YES, ONCOLOGY

## 2018-01-09 NOTE — PROGRESS NOTES
CC:  Cough, vaginal dryness    HPI:  Lolly Landeros is a 75 y.o. female   who presents today with complaints of cough:  Productive, sore throat : irritated with cough for 1 weeks. She tried cough lozenges with mild relief of symptoms. She can not attribute to exposure. The patient has been vaccinated for influenza this year. The patient is not a smoker. She denies CP, SOB, wheezing or dyspnea. She denies fever, chills, headache, dizziness, nausea, vomiting, backache, dysuria. She complains of vaginal irritation x 2 days, denies vaginal discharge. She has taken BP medications today. ROS: As pertinent in HPI. PHYSICAL EXAM:    Visit Vitals    /82 (BP 1 Location: Left arm, BP Patient Position: Sitting)    Pulse 75    Temp 98.8 °F (37.1 °C) (Oral)    Resp 18    Ht 5' 5\" (1.651 m)    Wt 203 lb 12.8 oz (92.4 kg)    SpO2 96%    BMI 33.91 kg/m2       Physical Exam    Diagnoses and all orders for this visit:    1. Vaginal irritation  -     clotrimazole-betamethasone (LOTRISONE) topical cream; Use 2-3 times daily as needed for irritation.  -     AMB POC URINALYSIS DIP STICK AUTO W/O MICRO    2. Cough  -     albuterol (PROVENTIL HFA, VENTOLIN HFA, PROAIR HFA) 90 mcg/actuation inhaler; Take 2 Puffs by inhalation every four (4) hours as needed for Wheezing. Indications: BRONCHOSPASM PREVENTION  -     codeine salvador-chlorphenir salvador (TUZISTRA XR) 14.7-2.8 mg/5 mL su12; Take 10 mL by mouth two (2) times a day. Max Daily Amount: 20 mL. Indications: Cough    3. Upper respiratory tract infection, unspecified type - antihistamine in cough medicine. Hydrate well. May use vicks, humidifier at home, ayr nasal spray. Medication and side effects, including risk and signs of allergy were discussed. Patient given printed information with diagnoses and medication information. Answered all questions and patient acknowledged understanding.      Nany Oneil MPA, PA-C  Internists at [facility] reviewed the patient's medical history, the physician assistant's findings on physical examination, the patient's diagnoses, and treatment plan as documented in the progress note. I concur with the treatment plan as documented. There are no additional recommendations at this time.     Julius Gotti MD              .

## 2018-01-09 NOTE — MR AVS SNAPSHOT
Visit Information Date & Time Provider Department Dept. Phone Encounter #  
 1/9/2018  9:30 AM Elsy Leyva PA-C [facility] [phone] [id number] Follow-up Instructions Return if symptoms worsen or fail to improve. Your Appointments 1/17/2018  9:00 AM  
LAB with [facility] NURSE [facility] ([facility]) Appt Note: 3 mos labs [address] [facility] [address] [address]  
[phone]  
  
   
 [address], [facility]  
  
    
 1/24/2018  2:00 PM  
Office Visit with Elsy Leyva PA-C [facility] (Ellsworth Love) Appt Note: 3 mos fu per [address] [facility], [facility] [address] [address]  
[phone]  
  
   
 [address], [address] [address]  
  
    
 3/26/2018  1:40 PM  
Follow Up with Elena Flannery MD  
[facility] [place] [address] ([facility]) Appt Note: 6 month f/up [place] [address] [address] [address]  
[phone] [address] [facility] [address]  [address] 4/11/2018  1:30 PM  
Office Visit with Scarlett Wesley MD  
[address]) Appt Note: 3 month f/u  
 [address] [facility] [address] [facility] [address]  
[phone]  
  
   
 [address] [facility] [address] [address] Upcoming Health Maintenance Date Due  
 MEDICARE YEARLY EXAM 1/18/2018* HEMOGLOBIN A1C Q6M 3/5/2018 EYE EXAM RETINAL OR DILATED Q1 3/20/2018 LIPID PANEL Q1 9/5/2018 FOOT EXAM Q1 9/13/2018 MICROALBUMIN Q1 9/13/2018 GLAUCOMA SCREENING Q2Y 3/20/2019 COLONOSCOPY 7/23/2019 BREAST CANCER SCRN MAMMOGRAM 10/11/2019 DTaP/Tdap/Td series (2 - Td) 9/13/2027 *Topic was postponed. The date shown is not the original due date.    
  
Allergies as of 1/9/2018  Review Complete On: 1/9/2018 By: Elsy Leyva PA-C  
  
 Severity Noted Reaction Type Reactions Sulfa (Sulfonamide Antibiotics)  06/22/2010    Other (comments)  
 nightmares Current Immunizations  Reviewed on 10/13/2014 Name Date Influenza High Dose Vaccine PF 9/13/2017 Influenza Vaccine 10/13/2014, 12/16/2013, 12/24/2012 Influenza Vaccine (Quad) PF 10/11/2016, 12/1/2015  2:30 PM  
 Influenza Vaccine Split 11/30/2011 Pneumococcal Conjugate (PCV-13) 4/19/2017 Pneumococcal Polysaccharide (PPSV-23) 6/11/2014 TD Vaccine 10/8/2008 Tdap 9/13/2017 Not reviewed this visit You Were Diagnosed With   
  
 Codes Comments Vaginal irritation    -  Primary ICD-10-CM: F30.1 ICD-9-CM: 623.9 Cough     ICD-10-CM: R05 ICD-9-CM: 459. 2 Upper respiratory tract infection, unspecified type     ICD-10-CM: J06.9 ICD-9-CM: 465.9 Vitals BP Pulse Temp Resp Height(growth percentile) Weight(growth percentile) 159/82 (BP 1 Location: Left arm, BP Patient Position: Sitting) 75 98.8 °F (37.1 °C) (Oral) 18 5' 5\" (1.651 m) 203 lb 12.8 oz (92.4 kg) SpO2 BMI OB Status Smoking Status 96% 33.91 kg/m2 Hysterectomy Never Smoker BMI and BSA Data Body Mass Index Body Surface Area  
 33.91 kg/m 2 2.06 m 2 Preferred Pharmacy [facility] Name [facility] [address] [place] [address] [phone] Your Updated Medication List  
  
   
This list is accurate as of: 1/9/18 10:39 AM.  Always use your most recent med list.  
  
  
  
  
 albuterol 90 mcg/actuation inhaler Commonly known as:  PROVENTIL HFA, VENTOLIN HFA, PROAIR HFA Take 2 Puffs by inhalation every four (4) hours as needed for Wheezing. Indications: BRONCHOSPASM PREVENTION  
  
 allopurinol 300 mg tablet Commonly known as:  ZYLOPRIM  
take 1 tablet by mouth once daily  
  
 amLODIPine 5 mg tablet Commonly known as:  Rulon Zoe Take 1 Tab by mouth daily. aspirin delayed-release 81 mg tablet Take  by mouth daily. atorvastatin 80 mg tablet Commonly known as:  LIPITOR Take 1 Tab by mouth daily. carvedilol 6.25 mg tablet Commonly known as:  Ching Locus Take 1 Tab by mouth two (2) times daily (with meals). cholecalciferol 5,000 unit capsule Commonly known as:  VITAMIN D3 Take 5,000 Units by mouth daily. clotrimazole-betamethasone topical cream  
Commonly known as:  Elvira Hayes Use 2-3 times daily as needed for irritation. codeine salvador-chlorphenir salvador 14.7-2.8 mg/5 mL Su12 Commonly known as:  TUZISTRA XR Take 10 mL by mouth two (2) times a day. Max Daily Amount: 20 mL. Indications: Cough  
  
 cyanocobalamin ER 1,000 mcg tablet Commonly known as:  VITAMIN B-12 Take 1 tablet by mouth daily. fenofibrate nanocrystallized 145 mg tablet Commonly known as:  [employer] Take 1 Tab by mouth daily. metFORMIN 1,000 mg tablet Commonly known as:  GLUCOPHAGE Take 1 Tab by mouth two (2) times daily (with meals). potassium chloride 20 mEq tablet Commonly known as:  K-DUR, KLOR-CON  
take 1 tablet by mouth once daily PriLOSEC OTC 20 mg tablet Generic drug:  omeprazole Take 20 mg by mouth daily. SITagliptin 50 mg tablet Commonly known as:  Glenn Halon Take 1 Tab by mouth daily. Indications: type 2 diabetes mellitus  
  
 valsartan-hydroCHLOROthiazide 160-25 mg per tablet Commonly known as:  DIOVAN-HCT  
take 1 tablet by mouth once daily Prescriptions Printed Refills  
 codeine salvador-chlorphenir salvador (TUZISTRA XR) 14.7-2.8 mg/5 mL su12 0 Sig: Take 10 mL by mouth two (2) times a day. Max Daily Amount: 20 mL. Indications: Cough Class: Print Route: Oral  
  
Prescriptions Sent to Pharmacy Refills  
 clotrimazole-betamethasone (LOTRISONE) topical cream 0 Sig: Use 2-3 times daily as needed for irritation.   
 Class: Normal  
 [facility]: [id number] [facility] [address], [address] [facility] [address] #: [phone]  
 albuterol (PROVENTIL HFA, VENTOLIN HFA, PROAIR HFA) 90 mcg/actuation inhaler 0 Sig: Take 2 Puffs by inhalation every four (4) hours as needed for Wheezing. Indications: BRONCHOSPASM PREVENTION Class: Normal  
 Pharmacy: [id number] [address] [place] [address] Ph #: [phone] Route: Inhalation We Performed the Following AMB POC URINALYSIS DIP STICK AUTO W/O MICRO [19054 CPT(R)] Follow-up Instructions Return if symptoms worsen or fail to improve. To-Do List   
 03/07/2018 1:30 PM  
  Appointment with [facility] IE[facility] MACHINE (WT ) at [facility] NON-INVASIVE CARD ([phone]) Age Limit for ALL Heart procedures @ all [facility] facilities: 18 yrs and older only. Under the age of 25, refer to [address] ([phone]). Wt Limit: 350lbs. This study requires patient to bring a written physician's order or MD office may fax the order to [facility] at [phone]. Patient needs to bring a current list of all medications. No preparation is required for this study. Patients should report 15 minutes prior to their appointment time to the [address]. Patient Instructions Learning About High Blood Pressure What is high blood pressure? Blood pressure is a measure of how hard the blood pushes against the walls of your arteries. It's normal for blood pressure to go up and down throughout the day, but if it stays up, you have high blood pressure. Another name for high blood pressure is hypertension. Two numbers tell you your blood pressure. The first number is the systolic pressure. It shows how hard the blood pushes when your heart is pumping. The second number is the diastolic pressure. It shows how hard the blood pushes between heartbeats, when your heart is relaxed and filling with blood. A blood pressure of less than 120/80 (say \"120 over 80\") is ideal for an adult. High blood pressure is 140/90 or higher. You have high blood pressure if your top number is 140 or higher or your bottom number is 90 or higher, or both. Many people fall into the category in between, called prehypertension. People with prehypertension need to make lifestyle changes to bring their blood pressure down and help prevent or delay high blood pressure. What happens when you have high blood pressure? · Blood flows through your arteries with too much force. Over time, this damages the walls of your arteries. But you can't feel it. High blood pressure usually doesn't cause symptoms. · Fat and calcium start to build up in your arteries. This buildup is called plaque. Plaque makes your arteries narrower and stiffer. Blood can't flow through them as easily. · This lack of good blood flow starts to damage some of the organs in your body. This can lead to problems such as coronary artery disease and heart attack, heart failure, stroke, kidney failure, and eye damage. How can you prevent high blood pressure? · Stay at a healthy weight. · Try to limit how much sodium you eat to less than 2,300 milligrams (mg) a day. If you limit your sodium to 1,500 mg a day, you can lower your blood pressure even more. ¨ Buy foods that are labeled \"unsalted,\" \"sodium-free,\" or \"low-sodium. \" Foods labeled \"reduced-sodium\" and \"light sodium\" may still have too much sodium. ¨ Flavor your food with garlic, lemon juice, onion, vinegar, herbs, and spices instead of salt. Do not use soy sauce, steak sauce, onion salt, garlic salt, mustard, or ketchup on your food. ¨ Use less salt (or none) when recipes call for it. You can often use half the salt a recipe calls for without losing flavor. · Be physically active. Get at least 30 minutes of exercise on most days of the week. Walking is a good choice.  You also may want to do other activities, such as running, swimming, cycling, or playing tennis or team sports. · Limit alcohol to 2 drinks a day for men and 1 drink a day for women. · Eat plenty of fruits, vegetables, and low-fat dairy products. Eat less saturated and total fats. How is high blood pressure treated? · Your doctor will suggest making lifestyle changes. For example, your doctor may ask you to eat healthy foods, quit smoking, lose extra weight, and be more active. · If lifestyle changes don't help enough or your blood pressure is very high, you will have to take medicine every day. Follow-up care is a key part of your treatment and safety. Be sure to make and go to all appointments, and call your doctor if you are having problems. It's also a good idea to know your test results and keep a list of the medicines you take. Where can you learn more? Go to http://sravan-arcadio.info/. Enter P501 in the search box to learn more about \"Learning About High Blood Pressure. \" Current as of: September 21, 2016 Content Version: 11.4 © 7811-3404 [employer]. Care instructions adapted under license by [employer] (which disclaims liability or warranty for this information). If you have questions about a medical condition or this instruction, always ask your healthcare professional. [address] any warranty or liability for your use of this information. Learning About High Cholesterol What is high cholesterol? Cholesterol is a type of fat in your blood. It is needed for many body functions, such as making new cells. Cholesterol is made by your body. It also comes from food you eat. If you have too much cholesterol, it starts to build up in your arteries. This is called hardening of the arteries, or atherosclerosis. High cholesterol raises your risk of a heart attack and stroke. There are different types of cholesterol. LDL is the \"bad\" cholesterol. High LDL can raise your risk for heart disease, heart attack, and stroke. HDL is the \"good\" cholesterol. High HDL is linked with a lower risk for heart disease, heart attack, and stroke. Your cholesterol levels help your doctor find out your risk for having a heart attack or stroke. How can you prevent high cholesterol? A heart-healthy lifestyle can help you prevent high cholesterol. This lifestyle helps lower your risk for a heart attack and stroke. · Eat heart-healthy foods. ¨ Eat fruits, vegetables, whole grains (like oatmeal), dried beans and peas, nuts and seeds, soy products (like tofu), and fat-free or low-fat dairy products. ¨ Replace butter, margarine, and hydrogenated or partially hydrogenated oils with olive and canola oils. (Canola oil margarine without trans fat is fine.) ¨ Replace red meat with fish, poultry, and soy protein (like tofu). ¨ Limit processed and packaged foods like chips, crackers, and cookies. · Be active. Exercise can improve your cholesterol level. Get at least 30 minutes of exercise on most days of the week. Walking is a good choice. You also may want to do other activities, such as running, swimming, cycling, or playing tennis or team sports. · Stay at a healthy weight. Lose weight if you need to. · Don't smoke. If you need help quitting, talk to your doctor about stop-smoking programs and medicines. These can increase your chances of quitting for good. How is high cholesterol treated? The goal of treatment is to reduce your chances of having a heart attack or stroke. The goal is not to lower your cholesterol numbers only. · You may make lifestyle changes, such as eating healthy foods, not smoking, losing weight, and being more active. · You may have to take medicine. Follow-up care is a key part of your treatment and safety.  Be sure to make and go to all appointments, and call your doctor if you are having problems. It's also a good idea to know your test results and keep a list of the medicines you take. Where can you learn more? Go to http://sravan-arcadio.info/. Enter D596 in the search box to learn more about \"Learning About High Cholesterol. \" Current as of: September 21, 2016 Content Version: 11.4 © 6025-0370 [employer]. Care instructions adapted under license by [employer] (which disclaims liability or warranty for this information). If you have questions about a medical condition or this instruction, always ask your healthcare professional. [place] [address] any warranty or liability for your use of this information. Learning About High Cholesterol What is high cholesterol? Cholesterol is a type of fat in your blood. It is needed for many body functions, such as making new cells. Cholesterol is made by your body. It also comes from food you eat. If you have too much cholesterol, it starts to build up in your arteries. This is called hardening of the arteries, or atherosclerosis. High cholesterol raises your risk of a heart attack and stroke. There are different types of cholesterol. LDL is the \"bad\" cholesterol. High LDL can raise your risk for heart disease, heart attack, and stroke. HDL is the \"good\" cholesterol. High HDL is linked with a lower risk for heart disease, heart attack, and stroke. Your cholesterol levels help your doctor find out your risk for having a heart attack or stroke. How can you prevent high cholesterol? A heart-healthy lifestyle can help you prevent high cholesterol. This lifestyle helps lower your risk for a heart attack and stroke. · Eat heart-healthy foods. ¨ Eat fruits, vegetables, whole grains (like oatmeal), dried beans and peas, nuts and seeds, soy products (like tofu), and fat-free or low-fat dairy products.  
¨ Replace butter, margarine, and hydrogenated or partially hydrogenated oils with olive and canola oils. (Canola oil margarine without trans fat is fine.) ¨ Replace red meat with fish, poultry, and soy protein (like tofu). ¨ Limit processed and packaged foods like chips, crackers, and cookies. · Be active. Exercise can improve your cholesterol level. Get at least 30 minutes of exercise on most days of the week. Walking is a good choice. You also may want to do other activities, such as running, swimming, cycling, or playing tennis or team sports. · Stay at a healthy weight. Lose weight if you need to. · Don't smoke. If you need help quitting, talk to your doctor about stop-smoking programs and medicines. These can increase your chances of quitting for good. How is high cholesterol treated? The goal of treatment is to reduce your chances of having a heart attack or stroke. The goal is not to lower your cholesterol numbers only. · You may make lifestyle changes, such as eating healthy foods, not smoking, losing weight, and being more active. · You may have to take medicine. Follow-up care is a key part of your treatment and safety. Be sure to make and go to all appointments, and call your doctor if you are having problems. It's also a good idea to know your test results and keep a list of the medicines you take. Where can you learn more? Go to http://sravan-arcadio.info/. Enter A487 in the search box to learn more about \"Learning About High Cholesterol. \" Current as of: September 21, 2016 Content Version: 11.4 © 0294-0377 [employer]wise[employer] Inc[employer]. Care instructions adapted under license by [employer] (which disclaims liability or warranty for this information). If you have questions about a medical condition or this instruction, always ask your healthcare professional. [place] [address] any warranty or liability for your use of this information. Introducing [facility]! [employer] introduces RapidMind patient portal. Now you can access parts of your medical record, email your doctor's office, and request medication refills online. 1. In your internet browser, go to [email or web address] 2. Click on the First Time User? Click Here link in the Sign In box. You will see the New Member Sign Up page. 3. Enter your RapidMind Access Code exactly as it appears below. You will not need to use this code after youve completed the sign-up process. If you do not sign up before the expiration date, you must request a new code. · RapidMind Access Code: [email or web address] Expires: 4/9/2018  9:27 AM 
 
4. Enter the last four digits of your Social Security Number (xxxx) and Date of Birth (mm/dd/yyyy) as indicated and click Submit. You will be taken to the next sign-up page. 5. Create a RapidMind ID. This will be your RapidMind login ID and cannot be changed, so think of one that is secure and easy to remember. 6. Create a RapidMind password. You can change your password at any time. 7. Enter your Password Reset Question and Answer. This can be used at a later time if you forget your password. 8. Enter your e-mail address. You will receive e-mail notification when new information is available in [address] 9. Click Sign Up. You can now view and download portions of your medical record. 10. Click the Download Summary menu link to download a portable copy of your medical information. If you have questions, please visit the Frequently Asked Questions section of the RapidMind website. Remember, RapidMind is NOT to be used for urgent needs. For medical emergencies, dial 911. Now available from your iPhone and Android! Please provide this summary of care documentation to your next provider. Your primary care clinician is listed as Kaye Gray. If you have any questions after today's visit, please call [phone].

## 2018-01-09 NOTE — PATIENT INSTRUCTIONS
Learning About High Blood Pressure  What is high blood pressure? Blood pressure is a measure of how hard the blood pushes against the walls of your arteries. It's normal for blood pressure to go up and down throughout the day, but if it stays up, you have high blood pressure. Another name for high blood pressure is hypertension. Two numbers tell you your blood pressure. The first number is the systolic pressure. It shows how hard the blood pushes when your heart is pumping. The second number is the diastolic pressure. It shows how hard the blood pushes between heartbeats, when your heart is relaxed and filling with blood. A blood pressure of less than 120/80 (say \"120 over 80\") is ideal for an adult. High blood pressure is 140/90 or higher. You have high blood pressure if your top number is 140 or higher or your bottom number is 90 or higher, or both. Many people fall into the category in between, called prehypertension. People with prehypertension need to make lifestyle changes to bring their blood pressure down and help prevent or delay high blood pressure. What happens when you have high blood pressure? · Blood flows through your arteries with too much force. Over time, this damages the walls of your arteries. But you can't feel it. High blood pressure usually doesn't cause symptoms. · Fat and calcium start to build up in your arteries. This buildup is called plaque. Plaque makes your arteries narrower and stiffer. Blood can't flow through them as easily. · This lack of good blood flow starts to damage some of the organs in your body. This can lead to problems such as coronary artery disease and heart attack, heart failure, stroke, kidney failure, and eye damage. How can you prevent high blood pressure? · Stay at a healthy weight. · Try to limit how much sodium you eat to less than 2,300 milligrams (mg) a day.  If you limit your sodium to 1,500 mg a day, you can lower your blood pressure even more.  ¨ Buy foods that are labeled \"unsalted,\" \"sodium-free,\" or \"low-sodium. \" Foods labeled \"reduced-sodium\" and \"light sodium\" may still have too much sodium. ¨ Flavor your food with garlic, lemon juice, onion, vinegar, herbs, and spices instead of salt. Do not use soy sauce, steak sauce, onion salt, garlic salt, mustard, or ketchup on your food. ¨ Use less salt (or none) when recipes call for it. You can often use half the salt a recipe calls for without losing flavor. · Be physically active. Get at least 30 minutes of exercise on most days of the week. Walking is a good choice. You also may want to do other activities, such as running, swimming, cycling, or playing tennis or team sports. · Limit alcohol to 2 drinks a day for men and 1 drink a day for women. · Eat plenty of fruits, vegetables, and low-fat dairy products. Eat less saturated and total fats. How is high blood pressure treated? · Your doctor will suggest making lifestyle changes. For example, your doctor may ask you to eat healthy foods, quit smoking, lose extra weight, and be more active. · If lifestyle changes don't help enough or your blood pressure is very high, you will have to take medicine every day. Follow-up care is a key part of your treatment and safety. Be sure to make and go to all appointments, and call your doctor if you are having problems. It's also a good idea to know your test results and keep a list of the medicines you take. Where can you learn more? Go to http://sravan-arcadio.info/. Enter P501 in the search box to learn more about \"Learning About High Blood Pressure. \"  Current as of: September 21, 2016  Content Version: 11.4  © 9011-1365 [employer]. Care instructions adapted under license by [employer] (which disclaims liability or warranty for this information).  If you have questions about a medical condition or this instruction, always ask your healthcare professional. [employer], [facility] disclaims any warranty or liability for your use of this information. Learning About High Cholesterol  What is high cholesterol? Cholesterol is a type of fat in your blood. It is needed for many body functions, such as making new cells. Cholesterol is made by your body. It also comes from food you eat. If you have too much cholesterol, it starts to build up in your arteries. This is called hardening of the arteries, or atherosclerosis. High cholesterol raises your risk of a heart attack and stroke. There are different types of cholesterol. LDL is the \"bad\" cholesterol. High LDL can raise your risk for heart disease, heart attack, and stroke. HDL is the \"good\" cholesterol. High HDL is linked with a lower risk for heart disease, heart attack, and stroke. Your cholesterol levels help your doctor find out your risk for having a heart attack or stroke. How can you prevent high cholesterol? A heart-healthy lifestyle can help you prevent high cholesterol. This lifestyle helps lower your risk for a heart attack and stroke. · Eat heart-healthy foods. ¨ Eat fruits, vegetables, whole grains (like oatmeal), dried beans and peas, nuts and seeds, soy products (like tofu), and fat-free or low-fat dairy products. ¨ Replace butter, margarine, and hydrogenated or partially hydrogenated oils with olive and canola oils. (Canola oil margarine without trans fat is fine.)  ¨ Replace red meat with fish, poultry, and soy protein (like tofu). ¨ Limit processed and packaged foods like chips, crackers, and cookies. · Be active. Exercise can improve your cholesterol level. Get at least 30 minutes of exercise on most days of the week. Walking is a good choice. You also may want to do other activities, such as running, swimming, cycling, or playing tennis or team sports. · Stay at a healthy weight. Lose weight if you need to. · Don't smoke.  If you need help quitting, talk to your doctor about stop-smoking programs and medicines. These can increase your chances of quitting for good. How is high cholesterol treated? The goal of treatment is to reduce your chances of having a heart attack or stroke. The goal is not to lower your cholesterol numbers only. · You may make lifestyle changes, such as eating healthy foods, not smoking, losing weight, and being more active. · You may have to take medicine. Follow-up care is a key part of your treatment and safety. Be sure to make and go to all appointments, and call your doctor if you are having problems. It's also a good idea to know your test results and keep a list of the medicines you take. Where can you learn more? Go to http://sravanNamo Mediaarcadio.info/. Enter A976 in the search box to learn more about \"Learning About High Cholesterol. \"  Current as of: September 21, 2016  Content Version: 11.4  © 6974-8658 [employer]. Care instructions adapted under license by [employer] (which disclaims liability or warranty for this information). If you have questions about a medical condition or this instruction, always ask your healthcare professional. [address] any warranty or liability for your use of this information. Learning About High Cholesterol  What is high cholesterol? Cholesterol is a type of fat in your blood. It is needed for many body functions, such as making new cells. Cholesterol is made by your body. It also comes from food you eat. If you have too much cholesterol, it starts to build up in your arteries. This is called hardening of the arteries, or atherosclerosis. High cholesterol raises your risk of a heart attack and stroke. There are different types of cholesterol. LDL is the \"bad\" cholesterol. High LDL can raise your risk for heart disease, heart attack, and stroke. HDL is the \"good\" cholesterol.  High HDL is linked with a lower risk for heart disease, heart attack, and stroke. Your cholesterol levels help your doctor find out your risk for having a heart attack or stroke. How can you prevent high cholesterol? A heart-healthy lifestyle can help you prevent high cholesterol. This lifestyle helps lower your risk for a heart attack and stroke. · Eat heart-healthy foods. ¨ Eat fruits, vegetables, whole grains (like oatmeal), dried beans and peas, nuts and seeds, soy products (like tofu), and fat-free or low-fat dairy products. ¨ Replace butter, margarine, and hydrogenated or partially hydrogenated oils with olive and canola oils. (Canola oil margarine without trans fat is fine.)  ¨ Replace red meat with fish, poultry, and soy protein (like tofu). ¨ Limit processed and packaged foods like chips, crackers, and cookies. · Be active. Exercise can improve your cholesterol level. Get at least 30 minutes of exercise on most days of the week. Walking is a good choice. You also may want to do other activities, such as running, swimming, cycling, or playing tennis or team sports. · Stay at a healthy weight. Lose weight if you need to. · Don't smoke. If you need help quitting, talk to your doctor about stop-smoking programs and medicines. These can increase your chances of quitting for good. How is high cholesterol treated? The goal of treatment is to reduce your chances of having a heart attack or stroke. The goal is not to lower your cholesterol numbers only. · You may make lifestyle changes, such as eating healthy foods, not smoking, losing weight, and being more active. · You may have to take medicine. Follow-up care is a key part of your treatment and safety. Be sure to make and go to all appointments, and call your doctor if you are having problems. It's also a good idea to know your test results and keep a list of the medicines you take. Where can you learn more? Go to http://sravan-arcadio.info/.   Enter P770 in the search box to learn more about \"Learning About High Cholesterol. \"  Current as of: September 21, 2016  Content Version: 11.4  © 5199-0893 [employer]. Care instructions adapted under license by [employer] (which disclaims liability or warranty for this information). If you have questions about a medical condition or this instruction, always ask your healthcare professional. [place] [address] any warranty or liability for your use of this information.

## 2018-01-10 RX ORDER — ATORVASTATIN CALCIUM 80 MG/1
TABLET, FILM COATED ORAL
Qty: 90 TAB | Refills: 1 | Status: SHIPPED | OUTPATIENT
Start: 2018-01-10 | End: 2018-07-17 | Stop reason: SDUPTHER

## 2018-01-17 ENCOUNTER — HOSPITAL ENCOUNTER (OUTPATIENT)
Dept: LAB | Age: 68
Discharge: HOME | End: 2018-01-17
Payer: COMMERCIAL

## 2018-01-17 ENCOUNTER — LAB ONLY (OUTPATIENT)
Dept: FAMILY MEDICINE CLINIC | Age: 68
End: 2018-01-17

## 2018-01-17 DIAGNOSIS — E11.9 TYPE 2 DIABETES MELLITUS WITHOUT COMPLICATION, WITHOUT LONG-TERM CURRENT USE OF INSULIN (HCC): ICD-10-CM

## 2018-01-17 DIAGNOSIS — I10 ESSENTIAL HYPERTENSION: ICD-10-CM

## 2018-01-17 DIAGNOSIS — E78.5 HYPERLIPIDEMIA, UNSPECIFIED HYPERLIPIDEMIA TYPE: ICD-10-CM

## 2018-01-17 DIAGNOSIS — Z79.899 LONG TERM USE OF DRUG: ICD-10-CM

## 2018-01-17 DIAGNOSIS — E55.9 VITAMIN D DEFICIENCY: ICD-10-CM

## 2018-01-17 DIAGNOSIS — E04.1 LEFT THYROID NODULE: ICD-10-CM

## 2018-01-17 DIAGNOSIS — E11.9 TYPE 2 DIABETES MELLITUS WITHOUT COMPLICATION, WITHOUT LONG-TERM CURRENT USE OF INSULIN (HCC): Primary | ICD-10-CM

## 2018-01-17 LAB
ALBUMIN SERPL-MCNC: 3.9 G/DL (ref 3.4–5)
ALBUMIN/GLOB SERPL: 1.4 {RATIO} (ref 0.8–1.7)
ALP SERPL-CCNC: 43 U/L (ref 45–117)
ALT SERPL-CCNC: 22 U/L (ref 13–56)
ANION GAP SERPL CALC-SCNC: 8 MMOL/L (ref 3–18)
AST SERPL-CCNC: 16 U/L (ref 15–37)
BASOPHILS # BLD: 0.1 K/UL (ref 0–0.06)
BASOPHILS NFR BLD: 1 % (ref 0–2)
BILIRUB SERPL-MCNC: 0.3 MG/DL (ref 0.2–1)
BUN SERPL-MCNC: 19 MG/DL (ref 7–18)
BUN/CREAT SERPL: 22 (ref 12–20)
CALCIUM SERPL-MCNC: 9.3 MG/DL (ref 8.5–10.1)
CHLORIDE SERPL-SCNC: 102 MMOL/L (ref 100–108)
CHOLEST SERPL-MCNC: 137 MG/DL
CO2 SERPL-SCNC: 30 MMOL/L (ref 21–32)
CREAT SERPL-MCNC: 0.88 MG/DL (ref 0.6–1.3)
DIFFERENTIAL METHOD BLD: ABNORMAL
EOSINOPHIL # BLD: 0.2 K/UL (ref 0–0.4)
EOSINOPHIL NFR BLD: 4 % (ref 0–5)
ERYTHROCYTE [DISTWIDTH] IN BLOOD BY AUTOMATED COUNT: 14.4 % (ref 11.6–14.5)
GLOBULIN SER CALC-MCNC: 2.8 G/DL (ref 2–4)
GLUCOSE SERPL-MCNC: 136 MG/DL (ref 74–99)
HBA1C MFR BLD: 7 % (ref 4.2–5.6)
HCT VFR BLD AUTO: 34.3 % (ref 35–45)
HDLC SERPL-MCNC: 45 MG/DL (ref 40–60)
HDLC SERPL: 3 {RATIO} (ref 0–5)
HGB BLD-MCNC: 10.5 G/DL (ref 12–16)
LDLC SERPL CALC-MCNC: 61.2 MG/DL (ref 0–100)
LIPID PROFILE,FLP: ABNORMAL
LYMPHOCYTES # BLD: 2 K/UL (ref 0.9–3.6)
LYMPHOCYTES NFR BLD: 33 % (ref 21–52)
MCH RBC QN AUTO: 26.9 PG (ref 24–34)
MCHC RBC AUTO-ENTMCNC: 30.6 G/DL (ref 31–37)
MCV RBC AUTO: 87.7 FL (ref 74–97)
MONOCYTES # BLD: 0.6 K/UL (ref 0.05–1.2)
MONOCYTES NFR BLD: 9 % (ref 3–10)
NEUTS SEG # BLD: 3.2 K/UL (ref 1.8–8)
NEUTS SEG NFR BLD: 53 % (ref 40–73)
PLATELET # BLD AUTO: 250 K/UL (ref 135–420)
PMV BLD AUTO: 11.2 FL (ref 9.2–11.8)
POTASSIUM SERPL-SCNC: 4.4 MMOL/L (ref 3.5–5.5)
PROT SERPL-MCNC: 6.7 G/DL (ref 6.4–8.2)
RBC # BLD AUTO: 3.91 M/UL (ref 4.2–5.3)
SODIUM SERPL-SCNC: 140 MMOL/L (ref 136–145)
TRIGL SERPL-MCNC: 154 MG/DL (ref ?–150)
TSH SERPL DL<=0.05 MIU/L-ACNC: 3.29 UIU/ML (ref 0.36–3.74)
VIT B12 SERPL-MCNC: 737 PG/ML (ref 211–911)
VLDLC SERPL CALC-MCNC: 30.8 MG/DL
WBC # BLD AUTO: 6.1 K/UL (ref 4.6–13.2)

## 2018-01-17 PROCEDURE — 82607 VITAMIN B-12: CPT | Performed by: PHYSICIAN ASSISTANT

## 2018-01-17 PROCEDURE — 83036 HEMOGLOBIN GLYCOSYLATED A1C: CPT | Performed by: PHYSICIAN ASSISTANT

## 2018-01-17 PROCEDURE — 80061 LIPID PANEL: CPT | Performed by: PHYSICIAN ASSISTANT

## 2018-01-17 PROCEDURE — 85025 COMPLETE CBC W/AUTO DIFF WBC: CPT | Performed by: PHYSICIAN ASSISTANT

## 2018-01-17 PROCEDURE — 82306 VITAMIN D 25 HYDROXY: CPT | Performed by: PHYSICIAN ASSISTANT

## 2018-01-17 PROCEDURE — 80053 COMPREHEN METABOLIC PANEL: CPT | Performed by: PHYSICIAN ASSISTANT

## 2018-01-17 PROCEDURE — 36415 COLL VENOUS BLD VENIPUNCTURE: CPT | Performed by: PHYSICIAN ASSISTANT

## 2018-01-17 PROCEDURE — 84443 ASSAY THYROID STIM HORMONE: CPT | Performed by: PHYSICIAN ASSISTANT

## 2018-01-18 LAB — 25(OH)D3 SERPL-MCNC: 35.3 NG/ML (ref 30–100)

## 2018-01-23 ENCOUNTER — HOSPITAL ENCOUNTER (OUTPATIENT)
Dept: RADIATION THERAPY | Age: 68
Discharge: HOME | End: 2018-01-23
Payer: COMMERCIAL

## 2018-01-23 PROCEDURE — 77290 THER RAD SIMULAJ FIELD CPLX: CPT

## 2018-01-23 PROCEDURE — 77332 RADIATION TREATMENT AID(S): CPT

## 2018-01-24 ENCOUNTER — OFFICE VISIT (OUTPATIENT)
Dept: FAMILY MEDICINE CLINIC | Age: 68
End: 2018-01-24

## 2018-01-24 VITALS
RESPIRATION RATE: 16 BRPM | HEIGHT: 65 IN | WEIGHT: 204.4 LBS | BODY MASS INDEX: 34.05 KG/M2 | TEMPERATURE: 97.7 F | OXYGEN SATURATION: 97 % | HEART RATE: 75 BPM | SYSTOLIC BLOOD PRESSURE: 127 MMHG | DIASTOLIC BLOOD PRESSURE: 75 MMHG

## 2018-01-24 DIAGNOSIS — E78.1 HYPERTRIGLYCERIDEMIA: ICD-10-CM

## 2018-01-24 DIAGNOSIS — E55.9 VITAMIN D DEFICIENCY: ICD-10-CM

## 2018-01-24 DIAGNOSIS — M1A.9XX0 CHRONIC GOUT WITHOUT TOPHUS, UNSPECIFIED CAUSE, UNSPECIFIED SITE: ICD-10-CM

## 2018-01-24 DIAGNOSIS — I10 ESSENTIAL HYPERTENSION: ICD-10-CM

## 2018-01-24 DIAGNOSIS — E11.21 TYPE 2 DIABETES MELLITUS WITH NEPHROPATHY (HCC): ICD-10-CM

## 2018-01-24 DIAGNOSIS — Z00.00 MEDICARE ANNUAL WELLNESS VISIT, SUBSEQUENT: Primary | ICD-10-CM

## 2018-01-24 DIAGNOSIS — C50.212 MALIGNANT NEOPLASM OF UPPER-INNER QUADRANT OF LEFT FEMALE BREAST, UNSPECIFIED ESTROGEN RECEPTOR STATUS (HCC): ICD-10-CM

## 2018-01-24 RX ORDER — LETROZOLE 2.5 MG/1
2.5 TABLET, FILM COATED ORAL DAILY
Refills: 0 | COMMUNITY
Start: 2018-01-16

## 2018-01-24 NOTE — PROGRESS NOTES
HPI:    Lolly Mejia  is a 75 y.o.  female  patient who comes in today for routine care and results of lab work. She presents with no complaints. She has had no family health changes. Patient states S[place]oes not exercise regularly. Patient states that  She does not follow a particular diet plan. Patient states that She is compliant with medications. Patient denies SOB, CP, dizziness, headache. She is followed by Jennifer and Katelynn Healy for breast cancer. She will start radiation  1/3/49352    Current Outpatient Prescriptions   Medication Sig Dispense Refill    letrozole (FEMARA) 2.5 mg tablet Take 2.5 mg by mouth daily. 0    atorvastatin (LIPITOR) 80 mg tablet take 1 tablet by mouth once daily 90 Tab 1    fenofibrate nanocrystallized (TRICOR) 145 mg tablet Take 1 Tab by mouth daily. 90 Tab 1    potassium chloride (K-DUR, KLOR-CON) 20 mEq tablet take 1 tablet by mouth once daily 90 Tab 1    carvedilol (COREG) 6.25 mg tablet Take 1 Tab by mouth two (2) times daily (with meals). (Patient taking differently: Take 3.125 mg by mouth two (2) times daily (with meals). ) 60 Tab 6    amLODIPine (NORVASC) 5 mg tablet Take 1 Tab by mouth daily. 90 Tab 3    SITagliptin (JANUVIA) 50 mg tablet Take 1 Tab by mouth daily. Indications: type 2 diabetes mellitus 90 Tab 2    allopurinol (ZYLOPRIM) 300 mg tablet take 1 tablet by mouth once daily 90 Tab 1    metFORMIN (GLUCOPHAGE) 1,000 mg tablet Take 1 Tab by mouth two (2) times daily (with meals). 180 Tab 3    valsartan-hydroCHLOROthiazide (DIOVAN-HCT) 160-25 mg per tablet take 1 tablet by mouth once daily 90 Tab 1    aspirin delayed-release 81 mg tablet Take  by mouth daily.  cyanocobalamin ER (VITAMIN B-12) 1,000 mcg tablet Take 1 tablet by mouth daily. 90 tablet 3    cholecalciferol (VITAMIN D3) 5,000 unit capsule Take 5,000 Units by mouth daily.  omeprazole (PRILOSEC OTC) 20 mg tablet Take 20 mg by mouth daily.         Allergies   Allergen Reactions    Sulfa (Sulfonamide Antibiotics) Other (comments)     nightmares      Past Medical History:   Diagnosis Date    Arthritis     Candida intertrigo 7/11/2011    Diabetes ([address]     GERD (gastroesophageal reflux disease)     Gout     Heart attack 08/12/2017    mild (tx'd medically)    Hypercholesterolemia     Hypertension       Family History   Problem Relation Age of Onset    Hypertension Father     Heart Disease Father     Hypertension Brother     Diabetes Brother     Heart Disease Mother     Breast Cancer Other       Patient Active Problem List   Diagnosis Code    Hyperlipidemia E78.5    GERD (gastroesophageal reflux disease) K21.9    Gout M10.9    Vitamin D deficiency E55.9    Type 2 diabetes mellitus ([address] E11.9    HTN (hypertension) I10    Left thyroid nodule E04.1    Absolute anemia D64.9    Primary osteoarthritis of right knee M17.11    Arthritis of knee M17.10    Cataract of both eyes H26.9    Dry eyes, bilateral H04.123    Vitreous floaters of both eyes H43.393    Lung nodules R91.8    Takotsubo cardiomyopathy I51.81    Breast cancer of upper-inner quadrant of left female breast ([facility] [address] T17.619    Personal history of breast cancer Z85.3    Type 2 diabetes mellitus with nephropathy (HCC) E11.21    Hypertriglyceridemia E78.1            Review of Systems   Constitutional: Negative for chills, fever and malaise/fatigue. HENT: Negative for hearing loss. Eyes: Negative for blurred vision and double vision. Respiratory: Negative for shortness of breath. Cardiovascular: Negative for chest pain and palpitations. Gastrointestinal: Negative for abdominal pain, blood in stool, constipation, diarrhea, heartburn, nausea and vomiting. Genitourinary: Negative for dysuria and urgency. Neurological: Negative for dizziness and headaches. Psychiatric/Behavioral: Negative for depression and memory loss.         Visit Vitals    /75    Pulse 75    Temp 97.7 °F (36.5 °C) (Oral)  Resp 16    Ht 5' 5\" (1.651 m)    Wt 204 lb 6.4 oz (92.7 kg)    SpO2 97%    BMI 34.01 kg/m2        Physical Exam   Constitutional: She is oriented to person, place, and time and well-developed, well-nourished, and in no distress. HENT:   Head: Normocephalic and atraumatic. Eyes: Conjunctivae are normal.   Neck: Normal range of motion. Neck supple. No thyromegaly present. Cardiovascular: Normal rate, regular rhythm, normal heart sounds and intact distal pulses. Pulmonary/Chest: Effort normal and breath sounds normal.   Abdominal: Soft. Bowel sounds are normal. She exhibits no distension and no mass. There is no tenderness. Musculoskeletal: She exhibits no edema. Lymphadenopathy:     She has no cervical adenopathy. Neurological: She is alert and oriented to person, place, and time. Vitals reviewed. Mini-Cog 3/3 word recall, clock drawing test without errors. There are no preventive care reminders to display for this patient. Assessment/Plan:    Diagnoses and all orders for this visit:    1. Medicare annual wellness visit, subsequent    2. Hypertriglyceridemia - Add Fish oil 1000 mg daily,    3. Vitamin D deficiency -  vitamin d is borderline low, cont 5,000 D3  And disc with Mable. 4. Essential hypertension - stable, controlled, cont current regimne    5. Chronic gout without tophus, unspecified cause, unspecified site - will watch, cont allopurinol    6. Type 2 diabetes mellitus with nephropathy (HCC) - A1c has decreased nicely, will continue to work on lowering and evaluate next routine visit    7. Malignant neoplasm of upper-inner quadrant of left female breast, unspecified estrogen receptor status ([facility] [address] - to start Radiation and follow up with Romel Ponce next month. Will hold Dexa until after Radiation. Additional Notes: Discussed today's diagnosis, treatment plans. Discussed medication indications and side effects.     Preventive Medicine:   Weight Management: Mediterranean diet recommended as well as exercise for 30 minutes daily most days of the week. DASH diet info given. After Visit Summary: Provided and discussed printed patient instructions. Answered all questions and patient acknowledged understanding. Follow-up Disposition:  Return in about 4 months (around 5/24/2018) for one week prior for labs. Ke Chavez PA-C     I reviewed the patient's medical history, the physician assistant's findings on physical examination, the patient's diagnoses, and treatment plan as documented in the progress note. I concur with the treatment plan as documented. There are no additional recommendations at this time.     Alysha Hicks MD

## 2018-01-24 NOTE — PROGRESS NOTES
Pt is here for 3 month follow up HTN, Diabetes, Cholesterol. Labs done    Medicare Wellness      1. Have you been to the ER, urgent care clinic since your last visit? Hospitalized since your last visit? No    2. Have you seen or consulted any other health care providers outside of the [address] [place] [address] since your last visit? Include any pap smears or colon screening. Yes Dr Jaimes-oncology 1/18,  Radiation oncology @ [facility] 1/23/18          Mamadouluis Mis is not abusedhis is a Subsequent Medicare Annual Wellness Exam (AWV) (Performed 12 months after IPPE or effective date of Medicare Part B enrollment)    I have reviewed the patient's medical history in detail and updated the computerized patient record.      History     Past Medical History:   Diagnosis Date    Arthritis     Candida intertrigo 7/11/2011    Diabetes ([address]     GERD (gastroesophageal reflux disease)     Gout     Heart attack 08/12/2017    mild (tx'd medically)    Hypercholesterolemia     Hypertension       Past Surgical History:   Procedure Laterality Date    BX/REMV,LYMPH NODE,DEEP AXILL N/A 11/14/2017    Dr. Maki Raymond      Calcium deposits    HX DILATION AND CURETTAGE      HX HEART CATHETERIZATION  08/17/2017    no blockages    HX HEENT  07/2016    cataract surgery bilaterally    HX HYSTERECTOMY      HX KNEE REPLACEMENT      Right    HX MASTECTOMY Left 11/14/2017    LEFT BREAST LUMPECTOMY WITH NEEDLE LOCALIZATION Marley Perfect LYMPH NODE BIOPSY  performed by Yayo Aiken MD at [address] HX ORTHOPAEDIC      HX RADHA AND BSO      HX Jessica; 1999    Bladder surgery    TN COLONOSCOPY FLX DX W/COLLJ SPEC WHEN PFRMD  7-23-09    normal;     TN MASTECTOMY, PARTIAL Left 11/14/2017    Dr. Janice Addison     Current Outpatient Prescriptions   Medication Sig Dispense Refill    atorvastatin (LIPITOR) 80 mg tablet take 1 tablet by mouth once daily 90 Tab 1    fenofibrate nanocrystallized (TRICOR) 145 mg tablet Take 1 Tab by mouth daily. 90 Tab 1    potassium chloride (K-DUR, KLOR-CON) 20 mEq tablet take 1 tablet by mouth once daily 90 Tab 1    carvedilol (COREG) 6.25 mg tablet Take 1 Tab by mouth two (2) times daily (with meals). (Patient taking differently: Take 3.125 mg by mouth two (2) times daily (with meals). ) 60 Tab 6    amLODIPine (NORVASC) 5 mg tablet Take 1 Tab by mouth daily. 90 Tab 3    SITagliptin (JANUVIA) 50 mg tablet Take 1 Tab by mouth daily. Indications: type 2 diabetes mellitus 90 Tab 2    allopurinol (ZYLOPRIM) 300 mg tablet take 1 tablet by mouth once daily 90 Tab 1    metFORMIN (GLUCOPHAGE) 1,000 mg tablet Take 1 Tab by mouth two (2) times daily (with meals). 180 Tab 3    valsartan-hydroCHLOROthiazide (DIOVAN-HCT) 160-25 mg per tablet take 1 tablet by mouth once daily 90 Tab 1    aspirin delayed-release 81 mg tablet Take  by mouth daily.  cyanocobalamin ER (VITAMIN B-12) 1,000 mcg tablet Take 1 tablet by mouth daily. 90 tablet 3    cholecalciferol (VITAMIN D3) 5,000 unit capsule Take 5,000 Units by mouth daily.  omeprazole (PRILOSEC OTC) 20 mg tablet Take 20 mg by mouth daily.  letrozole (FEMARA) 2.5 mg tablet Take 2.5 mg by mouth daily. 0    clotrimazole-betamethasone (LOTRISONE) topical cream Use 2-3 times daily as needed for irritation. 15 g 0    albuterol (PROVENTIL HFA, VENTOLIN HFA, PROAIR HFA) 90 mcg/actuation inhaler Take 2 Puffs by inhalation every four (4) hours as needed for Wheezing. Indications: BRONCHOSPASM PREVENTION 1 Inhaler 0    codeine salvador-chlorphenir salvador (TUZISTRA XR) 14.7-2.8 mg/5 mL su12 Take 10 mL by mouth two (2) times a day. Max Daily Amount: 20 mL.  Indications: Cough 120 mL 0     Allergies   Allergen Reactions    Sulfa (Sulfonamide Antibiotics) Other (comments)     nightmares     Family History   Problem Relation Age of Onset    Hypertension Father     Heart Disease Father     Hypertension Brother     Diabetes Brother    [facility] Heart Disease Mother     Breast Cancer Other      Social History   Substance Use Topics    Smoking status: Never Smoker    Smokeless tobacco: Never Used    Alcohol use No     Patient Active Problem List   Diagnosis Code    Hyperlipidemia E78.5    GERD (gastroesophageal reflux disease) K21.9    Gout M10.9    Vitamin D deficiency E55.9    Type 2 diabetes mellitus ([address] E11.9    HTN (hypertension) I10    Left thyroid nodule E04.1    Absolute anemia D64.9    Primary osteoarthritis of right knee M17.11    Arthritis of knee M17.10    Cataract of both eyes H26.9    Dry eyes, bilateral H04.123    Vitreous floaters of both eyes H43.393    Lung nodules R91.8    Takotsubo cardiomyopathy I51.81    Breast cancer of upper-inner quadrant of left female breast (HCC) C50.212    Personal history of breast cancer Z85.3    Type 2 diabetes mellitus with nephropathy (HCC) E11.21       Depression Risk Factor Screening:     PHQ over the last two weeks 1/24/2018   Little interest or pleasure in doing things Not at all   Feeling down, depressed or hopeless Not at all   Total Score PHQ 2 0     Alcohol Risk Factor Screening:   Pt denies drinking al;cohol. Functional Ability and Level of Safety:   Hearing Loss  Hearing is good. Activities of Daily Living  The home contains: no safety equipment. Patient does total self care    Fall Risk  Fall Risk Assessment, last 12 mths 1/24/2018   Able to walk? Yes   Fall in past 12 months?  No   Fall with injury? -   Number of falls in past 12 months -   Fall Risk Score -       Abuse Screen  Patient is not abused    Cognitive Screening   Evaluation of Cognitive Function:  Has your family/caregiver stated any concerns about your memory: no  Normal    Patient Care Team   Patient Care Team:  Hammad Bain PA-C as PCP - General (Physician Assistant)  Emma Haynes MD (Cardiology)       Assessment/Plan   Education and counseling provided:  Are appropriate based on today's review and evaluation  Bone mass measurement (DEXA)    Diagnoses and all orders for this visit:    1. Medicare annual wellness visit, subsequent      Health Maintenance Due   Topic Date Due    MEDICARE YEARLY EXAM  10/12/2017       Medicare Part B Preventive Services Limitations Recommendation Scheduled   Bone Mass Measurement  (age 72 & older, biennial) Requires diagnosis related to osteoporosis or estrogen deficiency. Biennial benefit unless patient has history of long-term glucocorticoid tx or baseline is needed because initial test was by other method  Pend until after radiation completed   Cardiovascular Screening Blood Tests (every 5 years)  Total cholesterol, HDL, Triglycerides Order as a panel if possible  1/17/2018   Colorectal Cancer Screening  -Fecal occult blood test (annual)  -Flexible sigmoidoscopy (5y)  -Screening colonoscopy (10y)  -Barium Enema      Counseling to Prevent Tobacco Use (up to 8 sessions per year)  - Counseling greater than 3 and up to 10 minutes  - Counseling greater than 10 minutes Patients must be asymptomatic of tobacco-related conditions to receive as preventive service  n/a   Diabetes Screening Tests (at least every 3 years, Medicare covers annually or at 6-month intervals for prediabetic patients)    Fasting blood sugar (FBS) or glucose tolerance test (GTT) Patient must be diagnosed with one of the following:  -Hypertension, Dyslipidemia, obesity, previous impaired FBS or GTT  Or any two of the following: overweight, FH of diabetes, age ? 72, history of gestational diabetes, birth of baby weighing more than 9 pounds  1/17/18   Diabetes Self-Management Training (DSMT) (no USPSTF recommendation) Requires referral by treating physician for patient with diabetes or renal disease. 10 hours of initial DSMT session of no less than 30 minutes each in a continuous 12-month period. 2 hours of follow-up DSMT in subsequent years.   n/a   Glaucoma Screening (no USPSTF recommendation) Diabetes mellitus, family history, , age 48 or over,  American, age 72 or over  3/20/17   Human Immunodeficiency Virus (HIV) Screening (annually for increased risk patients)  HIV-1 and HIV-2 by EIA, JOE, rapid antibody test, or oral mucosa transudate Patient must be at increased risk for HIV infection per USPSTF guidelines or pregnant. Tests covered annually for patients at increased risk. Pregnant patients may receive up to 3 test during pregnancy. n/a   Medical Nutrition Therapy (MNT) (for diabetes or renal disease not recommended schedule) Requires referral by treating physician for patient with diabetes or renal disease. Can be provided in same year as diabetes self-management training (DSMT), and CMS recommends medical nutrition therapy take place after DSMT. Up to 3 hours for initial year and 2 hours in subsequent years. N/a   Prostate Cancer Screening (annually up to age 76)  - Digital rectal exam (RADHA)  - Prostate specific antigen (PSA) Annually (age 48 or over), RADHA not paid separately when covered E/M service is provided on same date  na   Seasonal Influenza Vaccination (annually)   9/13/17   Pneumococcal Vaccination (once after 72)   6/1/144/19/17   Hepatitis B Vaccinations (if medium/high risk) Medium/high risk factors:  End-stage renal disease,  Hemophiliacs who received Factor VIII or IX concentrates, Clients of institutions for the mentally retarded, Persons who live in the same house as a HepB virus carrier, Homosexual men, Illicit injectable drug abusers. n/a   Screening Mammography (biennial age 54-69)?  Annually (age 36 or over)  9/26/17   Screening Pap Tests and Pelvic Examination (up to age 79 and after 79 if unknown history or abnormal study last 10 years) Every 24 months except high risk  Not indicated   Ultrasound Screening for Abdominal Aortic Aneurysm (AAA) (once) Patient must be referred through IPPE and not have had a screening for abdominal aortic aneurysm before under Medicare.   Limited to patients who meet one of the following criteria:  - Men who are 73-68 years old and have smoked more than 100 cigarettes in their lifetime.  -Anyone with a FH of AAA  -Anyone recommended for screening by USPSTF  n/a   Family Practice Management 2011    Ivan Nicholson MPA, PA-C  [facility]

## 2018-01-24 NOTE — MR AVS SNAPSHOT
[address] [facility] 
 
 
 [address]60 [place] [address] 
[phone] Patient: Lolly Calles MRN: [id number] CATRACHITA:[id number] Visit Information Date & Time Provider Department Dept. Phone Encounter #  
 1/24/2018  2:00 PM Vicki Espinoza PA-C [facility] [phone] [id number] Follow-up Instructions Return in about 4 months (around 5/24/2018) for one week prior for labs. Your Appointments 3/26/2018  1:40 PM  
Follow Up with Stan Bush MD  
[facility] ([place] [facility]) Appt Note: 6 month f/up [place] [phone]  
[phone] [address] [place] [address] [address] 4/11/2018  1:30 PM  
Office Visit with Tomeka Alvarez MD  
[address] [place] [facility]) Appt Note: 3 month f/u  
 [address] [place] [address] [facility] [address]  
[phone]  
  
   
 [address] [place] [address] [facility] [address] Upcoming Health Maintenance Date Due  
 EYE EXAM RETINAL OR DILATED Q1 3/20/2018 HEMOGLOBIN A1C Q6M 7/17/2018 FOOT EXAM Q1 9/13/2018 MICROALBUMIN Q1 9/13/2018 LIPID PANEL Q1 1/17/2019 MEDICARE YEARLY EXAM 1/25/2019 GLAUCOMA SCREENING Q2Y 3/20/2019 COLONOSCOPY 7/23/2019 BREAST CANCER SCRN MAMMOGRAM 10/11/2019 DTaP/Tdap/Td series (2 - Td) 9/13/2027 Allergies as of 1/24/2018  Review Complete On: 1/24/2018 By: Vicki Espinoza PA-C Severity Noted Reaction Type Reactions Sulfa (Sulfonamide Antibiotics)  06/22/2010    Other (comments)  
 nightmares Current Immunizations  Reviewed on 10/13/2014 Name Date Influenza High Dose Vaccine PF 9/13/2017 Influenza Vaccine 10/13/2014, 12/16/2013, 12/24/2012 Influenza Vaccine (Quad) PF 10/11/2016, 12/1/2015  2:30 PM  
 Influenza Vaccine Split 11/30/2011 Pneumococcal Conjugate (PCV-13) 4/19/2017 Pneumococcal Polysaccharide (PPSV-23) 6/11/2014 TD Vaccine 10/8/2008 Tdap 9/13/2017 Not reviewed this visit You Were Diagnosed With   
  
 Codes Comments Medicare annual wellness visit, subsequent    -  Primary ICD-10-CM: Z00.00 ICD-9-CM: V70.0 Hypertriglyceridemia     ICD-10-CM: E78.1 ICD-9-CM: 272.1 Vitamin D deficiency     ICD-10-CM: E55.9 ICD-9-CM: 268.9 Essential hypertension     ICD-10-CM: I10 
ICD-9-CM: 401.9 Chronic gout without tophus, unspecified cause, unspecified site     ICD-10-CM: M1A. 9XX0 
ICD-9-CM: 274.02 Type 2 diabetes mellitus with nephropathy (HCC)     ICD-10-CM: E11.21 
ICD-9-CM: 250.40, 583.81 Malignant neoplasm of upper-inner quadrant of left female breast, unspecified estrogen receptor status ([facility]     ICD-10-CM: O91.949 ICD-9-CM: 174.2 Vitals BP Pulse Temp Resp Height(growth percentile) Weight(growth percentile) 127/75 75 97.7 °F (36.5 °C) (Oral) 16 5' 5\" (1.651 m) 204 lb 6.4 oz (92.7 kg) SpO2 BMI OB Status Smoking Status 97% 34.01 kg/m2 Hysterectomy Never Smoker Vitals History BMI and BSA Data Body Mass Index Body Surface Area 34.01 kg/m 2 2.06 m 2 Preferred Pharmacy Pharmacy Name [facility] [address] [place], [place] [address] [phone] Your Updated Medication List  
  
   
This list is accurate as of: 1/24/18  3:17 PM.  Always use your most recent med list.  
  
  
  
  
 allopurinol 300 mg tablet Commonly known as:  ZYLOPRIM  
take 1 tablet by mouth once daily  
  
 amLODIPine 5 mg tablet Commonly known as:  Art Free Take 1 Tab by mouth daily. aspirin delayed-release 81 mg tablet Take  by mouth daily. atorvastatin 80 mg tablet Commonly known as:  LIPITOR  
take 1 tablet by mouth once daily  
  
 carvedilol 6.25 mg tablet Commonly known as:  Joseph Dyer  
 Take 1 Tab by mouth two (2) times daily (with meals). cholecalciferol 5,000 unit capsule Commonly known as:  VITAMIN D3 Take 5,000 Units by mouth daily. cyanocobalamin ER 1,000 mcg tablet Commonly known as:  VITAMIN B-12 Take 1 tablet by mouth daily. fenofibrate nanocrystallized 145 mg tablet Commonly known as:  [employer] Take 1 Tab by mouth daily. letrozole 2.5 mg tablet Commonly known as:  [facility] Take 2.5 mg by mouth daily. metFORMIN 1,000 mg tablet Commonly known as:  GLUCOPHAGE Take 1 Tab by mouth two (2) times daily (with meals). potassium chloride 20 mEq tablet Commonly known as:  K-DUR, KLOR-CON  
take 1 tablet by mouth once daily PriLOSEC OTC 20 mg tablet Generic drug:  omeprazole Take 20 mg by mouth daily. SITagliptin 50 mg tablet Commonly known as:  Lynnette Mix Take 1 Tab by mouth daily. Indications: type 2 diabetes mellitus  
  
 valsartan-hydroCHLOROthiazide 160-25 mg per tablet Commonly known as:  DIOVAN-HCT  
take 1 tablet by mouth once daily Follow-up Instructions Return in about 4 months (around 5/24/2018) for one week prior for labs. To-Do List   
 01/30/2018 8:00 AM  
  Appointment with [facility] RADIATION ONCOLOGY at [facility] RADIATION THERAPY ([phone]) This appointment time is simply a place [place] and may not reflect the true appointment time. If patient does not know the appointment time given to them at the time of scheduling, they should contact the Radiation Therapy department for detail. 01/31/2018 8:00 AM  
  Appointment with [facility] RADIATION ONCOLOGY at [facility] RADIATION [facility] ([phone]) This appointment time is simply a place [place] and may not reflect the true appointment time. If patient does not know the appointment time given to them at the time of scheduling, they should contact the Radiation Therapy department for detail.   
  
 02/01/2018 8:00 AM  
 Appointment with [facility] RADIATION ONCOLOGY at [facility] RADIATION THERAPY ([phone]) This appointment time is simply a place [place] and may not reflect the true appointment time. If patient does not know the appointment time given to them at the time of scheduling, they should contact the Radiation Therapy department for detail. 02/02/2018 8:00 AM  
  Appointment with [facility] RADIATION ONCOLOGY at [facility] RADIATION THERAPY ([phone]) This appointment time is simply a place [place] and may not reflect the true appointment time. If patient does not know the appointment time given to them at the time of scheduling, they should contact the Radiation Therapy department for detail. 02/05/2018 8:00 AM  
  Appointment with [facility] RADIATION ONCOLOGY at [facility] RADIATION [facility] ([phone]) This appointment time is simply a place [place] and may not reflect the true appointment time. If patient does not know the appointment time given to them at the time of scheduling, they should contact the Radiation Therapy department for detail. 02/06/2018 8:00 AM  
  Appointment with [facility] RADIATION ONCOLOGY at [facility] RADIATION [facility] ([phone]) This appointment time is simply a place [place] and may not reflect the true appointment time. If patient does not know the appointment time given to them at the time of scheduling, they should contact the Radiation Therapy department for detail. 02/07/2018 8:00 AM  
  Appointment with [facility] RADIATION ONCOLOGY at [facility] RADIATION [facility] ([phone]) This appointment time is simply a place [place] and may not reflect the true appointment time. If patient does not know the appointment time given to them at the time of scheduling, they should contact the Radiation Therapy department for detail. 02/08/2018 8:00 AM  
  Appointment with [facility] RADIATION ONCOLOGY at [facility] RADIATION [facility] ([phone]) This appointment time is simply a place [place] and may not reflect the true appointment time. If patient does not know the appointment time given to them at the time of scheduling, they should contact the Radiation Therapy department for detail. 02/09/2018 8:00 AM  
  [facility] RAD[facility]ATION ONCOL[facility] at [facility] RADIATION [facility] ([phone]) This appointment time is simply a place [place] and may not reflect the true appointment time. If patient does not know the appointment time given to them at the time of scheduling, they should contact the Radiation Therapy department for detail.  
  
 02/12/2018 8:00 AM  
  Appointment with [facility] RADIATION ONCOLOGY at [facility] RADIATION [facility] ([phone]) This appointment time is simply a place [place] and may not reflect the true appointment time. If patient does not know the appointment time given to them at the time of scheduling, they should contact the Radiation Therapy department for detail.  
  
 02/13/2018 8:00 AM  
  Appointment with [facility] RADIATION ONCOLOGY at [facility] RADIATION [facility] ([phone]) This appointment time is simply a place [place] and may not reflect the true appointment time. If patient does not know the appointment time given to them at the time of scheduling, they should contact the Radiation Therapy department for detail. 02/14/2018 8:00 AM  
  Appointment with [facility] RADIATION ONCOLOGY at [facility] RADIATION [facility] ([phone]) This appointment time is simply a place [place] and may not reflect the true appointment time. If patient does not know the appointment time given to them at the time of scheduling, they should contact the Radiation Therapy department for detail. 02/15/2018 8:00 AM  
  Appointment with [facility] RADIATION ONCOLOGY at [facility] RADIATION [facility] ([phone]) This appointment time is simply a place [place] and may not reflect the true appointment time.   If patient does not know the appointment time given to them at the time of scheduling, they should contact the Radiation Therapy department for detail.  
  
 02/16/2018 8:00 AM  
  Appointment with [facility] RADIATION ONCOLOGY at [facility] RADIATION [facility] ([phone]) This appointment time is simply a place [place] and may not reflect the true appointment time. If patient does not know the appointment time given to them at the time of scheduling, they should contact the Radiation Therapy department for detail.  
  
 02/19/2018 8:00 AM  
  Appointment with [facility] RADIATION ONCOLOGY at [facility] RADIATION [facility] ([phone]) This appointment time is simply a place [place] and may not reflect the true appointment time. If patient does not know the appointment time given to them at the time of scheduling, they should contact the Radiation Therapy department for detail. 02/20/2018 8:00 AM  
  Appointment with [facility] RADIATION ONCOLOGY at [facility] ([phone]) This appointment time is simply a place [place] and may not reflect the true appointment time. If patient does not know the appointment time given to them at the time of scheduling, they should contact the Radiation Therapy department for detail.  
  
 02/21/2018 8:00 AM  
  Appointment with [facility] RADIATION ONCOLOGY at [facility] RADIATION [facility] ([phone]) This appointment time is simply a place [place] and may not reflect the true appointment time. If patient does not know the appointment time given to them at the time of scheduling, they should contact the Radiation Therapy department for detail.  
  
 02/22/2018 8:00 AM  
  Appointment with [facility] RADIATION ONCOLOGY at [facility] ([phone]) This appointment time is simply a place [place] and may not reflect the true appointment time. If patient does not know the appointment time given to them at the time of scheduling, they should contact the Radiation Therapy department for detail. 02/23/2018 8:00 AM  
  Appointment with [facility] RADIATION ONCOLOGY at [facility] RADIATION [facility] ([phone]) This appointment time is simply a place [place] and may not reflect the true appointment time. If patient does not know the appointment time given to them at the time of scheduling, they should contact the Radiation Therapy department for detail. 02/26/2018 8:00 AM  
  Appointment with [facility] RADIATION ONCOLOGY at [facility] RADIATION [facility] ([phone]) This appointment time is simply a place [place] and may not reflect the true appointment time. If patient does not know the appointment time given to them at the time of scheduling, they should contact the Radiation Therapy department for detail. 02/27/2018 8:00 AM  
  Appointment with [facility] RADIATION ONCOLOGY at [facility] RADIATION [facility] ([phone]) This appointment time is simply a place [place] and may not reflect the true appointment time. If patient does not know the appointment time given to them at the time of scheduling, they should contact the Radiation Therapy department for detail. 02/28/2018 8:00 AM  
  Appointment with [facility] RADIATION ONCOLOGY at [facility] RADIATION [facility] ([phone]) This appointment time is simply a place [place] and may not reflect the true appointment time. If patient does not know the appointment time given to them at the time of scheduling, they should contact the Radiation Therapy department for detail. 03/01/2018 8:00 AM  
  Appointment with [facility] RADIATION ONCOLOGY at [facility] RADIATION [facility] ([phone]) This appointment time is simply a place [place] and may not reflect the true appointment time. If patient does not know the appointment time given to them at the time of scheduling, they should contact the Radiation Therapy department for detail. 03/02/2018 8:00 AM  
  Appointment with [facility] RADIATION ONCOLOGY at [facility] RADIATION [facility] ([phone]) This appointment time is simply a place [place] and may not reflect the true appointment time.   If patient does not know the appointment time given to them at the time of scheduling, they should contact the Radiation Therapy department for detail. 03/05/2018 8:00 AM  
  Appointment with [facility] RADIATION ONCOLOGY at [facility] RADIATION THERAPY ([phone]) This appointment time is simply a place [place] and may not reflect the true appointment time. If patient does not know the appointment time given to them at the time of scheduling, they should contact the Radiation Therapy department for detail. 03/06/2018 8:00 AM  
  Appointment with [facility] RADIATION ONCOLOGY at [facility] RADIATION THERAPY ([phone]) This appointment time is simply a place [place] and may not reflect the true appointment time. If patient does not know the appointment time given to them at the time of scheduling, they should contact the Radiation Therapy department for detail. 03/07/2018 8:00 AM  
  Appointment with [facility] RADIATION ONCOLOGY at [facility] RADIATION [facility] ([phone]) This appointment time is simply a place [place] and may not reflect the true appointment time. If patient does not know the appointment time given to them at the time of scheduling, they should contact the Radiation Therapy department for detail. 03/07/2018 1:30 PM  
  Appointment with HBV- IE33 MACHINE (WT ) at [facility] NON-INVASIVE CARD ([phone]) Age Limit for ALL Heart procedures @ all [facility] facilities: 18 yrs and older only. Under the age of 25, refer to [facility] ([phone]). Wt Limit: 350lbs. This study requires patient to bring a written physician's order or MD office may fax the order to [facility] at [phone]. Patient needs to bring a current list of all medications. No preparation is required for this study. Patients should report 15 minutes prior to their appointment time to the [address].     
  
 03/08/2018 8:00 AM  
  Appointment with [facility] RADIATION ONCOLOGY at [facility] RADIATION [facility] ([phone]) This appointment time is simply a place [place] and may not reflect the true appointment time. If patient does not know the appointment time given to them at the time of scheduling, they should contact the Radiation Therapy department for detail. 03/09/2018 8:00 AM  
  Appointment with [facility] RADIATION ONCOLOGY at [facility] RADIATION [facility] ([phone]) This appointment time is simply a place [place] and may not reflect the true appointment time. If patient does not know the appointment time given to them at the time of scheduling, they should contact the Radiation Therapy department for detail.  
  
 03/12/2018 8:00 AM  
  Appointment with [facility] RADIATION ONCOLOGY at [facility] RADIATION [facility] ([phone]) This appointment time is simply a place [place] and may not reflect the true appointment time. If patient does not know the appointment time given to them at the time of scheduling, they should contact the Radiation Therapy department for detail.  
  
 03/13/2018 8:00 AM  
  Appointment with [facility] RADIATION ONCOLOGY at [facility] ([phone]) This appointment time is simply a place [place] and may not reflect the true appointment time. If patient does not know the appointment time given to them at the time of scheduling, they should contact the Radiation Therapy department for detail. Patient Instructions Hemoglobin A1c: About This Test 
What is it? Hemoglobin A1c is a blood test that checks your average blood sugar level over the past 2 to 3 months. This test also is called a glycohemoglobin test or an A1c test. 
Why is this test done? The A1c test is done to check how well your diabetes has been controlled over the past 2 to 3 months. Your doctor can use this information to adjust your medicine and diabetes treatment, if needed.  
How can you prepare for the test? 
You do not need to stop eating before you have an A1c test. This test can be done at any time during the day, even after a meal. 
What happens during the test? 
The health professional taking a sample of your blood will: · Wrap an elastic band around your upper arm. This makes the veins below the band larger so it is easier to put a needle into the vein. · Clean the needle site with alcohol. · Put the needle into the vein. · Attach a tube to the needle to fill it with blood. · Remove the band from your arm when enough blood is collected. · Put a gauze pad or cotton ball over the needle site as the needle is removed. · Put pressure on the site and then put on a bandage. What else should you know about the test? 
The test result is usually given as a percentage. The normal A1c is less than 5.7%. The A1c test result also can be used to find your estimated average glucose, or eAG. Your eAG and A1c show the same thing in two different ways. They both help you learn more about your average blood sugar range over the past 2 to 3 months. Where can you learn more? Go to http://sravan-arcadio.info/. Enter U216 in the search box to learn more about \"Hemoglobin A1c: About This Test.\" Current as of: March 13, 2017 Content Version: 11.4 © 4711-3344 [employer]. Care instructions adapted under license by [employer] (which disclaims liability or warranty for this information). If you have questions about a medical condition or this instruction, always ask your healthcare professional. [place] [address] any warranty or liability for your use of this information. Medicare Wellness Visit, Female The best way to live healthy is to have a healthy lifestyle by eating a well-balanced diet, exercising regularly, limiting alcohol and stopping smoking. Regular physical exams and screening tests are another way to keep healthy.  Preventive exams provided by your health care provider can find health problems before they become diseases or illnesses. Preventive services including immunizations, screening tests, monitoring and exams can help you take care of your own health. All people over age 72 should have a pneumovax  and and a prevnar shot to prevent pneumonia. These are once in a lifetime unless you and your provider decide differently. All people over 65 should have a yearly flu shot and a tetanus vaccine every 10 years. A bone mass density to screen for osteoporosis or thinning of the bones should be done every 2 years after 65. Screening for diabetes mellitus with a blood sugar test should be done every year. Glaucoma is a disease of the eye due to increased ocular pressure that can lead to blindness and it should be done every year by an eye professional. 
 
Cardiovascular screening tests that check for elevated lipids (fatty part of blood) which can lead to heart disease and strokes should be done every 5 years. Colorectal screening that evaluates for blood or polyps in your colon should be done yearly as a stool test or every five years as a flexible sigmoidoscope or every 10 years as a colonoscopy up to age 76. Breast cancer screening with a mammogram is recommended biennially  for women age 54-69. Screening for cervical cancer with a pap smear and pelvic exam is recommended for women after age 72 years every 2 years up to age 79 or when the provider and patient decide to stop. If there is a history of cervical abnormalities or other increased risk for cancer then the test is recommended yearly. Hepatitis C screening is also recommended for anyone born between [birth year] through [address]. A shingles vaccine is also recommended once in a lifetime after age 61. Your Medicare Wellness Exam is recommended annually. Here is a list of your current Health Maintenance items with a due date: 
Health Maintenance Due Topic Date Due  
 Annual Well Visit  10/12/2017 Medicare Part B Preventive Services Limitations Recommendation Scheduled Bone Mass Measurement 
(age 72 & older, biennial) Requires diagnosis related to osteoporosis or estrogen deficiency. Biennial benefit unless patient has history of long-term glucocorticoid tx or baseline is needed because initial test was by other method Needs Last test 
6/2/16 ordered Cardiovascular Screening Blood Tests (every 5 years) Total cholesterol, HDL, Triglycerides Order as a panel if possible  Had 1/17/18 Colorectal Cancer Screening 
-Fecal occult blood test (annual) -Flexible sigmoidoscopy (5y) 
-Screening colonoscopy (10y) -Barium Enema   Had 
7/23/09 Counseling to Prevent Tobacco Use (up to 8 sessions per year) - Counseling greater than 3 and up to 10 minutes - Counseling greater than 10 minutes Patients must be asymptomatic of tobacco-related conditions to receive as preventive service  N/a Diabetes Screening Tests (at least every 3 years, Medicare covers annually or at 6-month intervals for prediabetic patients) Fasting blood sugar (FBS) or glucose tolerance test (GTT) Patient must be diagnosed with one of the following: 
-Hypertension, Dyslipidemia, obesity, previous impaired FBS or GTT 
Or any two of the following: overweight, FH of diabetes, age ? 72, history of gestational diabetes, birth of baby weighing more than 9 pounds  1/17/18 Diabetes Self-Management Training (DSMT) (no USPSTF recommendation) Requires referral by treating physician for patient with diabetes or renal disease. 10 hours of initial DSMT session of no less than 30 minutes each in a continuous 12-month period. 2 hours of follow-up DSMT in subsequent years. na  
Glaucoma Screening (no USPSTF recommendation) Diabetes mellitus, family history, , age 48 or over,  American, age 72 or over  3/20/17 Human Immunodeficiency Virus (HIV) Screening (annually for increased risk patients) HIV-1 and HIV-2 by EIA, JOE, rapid antibody test, or oral mucosa transudate Patient must be at increased risk for HIV infection per USPSTF guidelines or pregnant. Tests covered annually for patients at increased risk. Pregnant patients may receive up to 3 test during pregnancy. na  
Medical Nutrition Therapy (MNT) (for diabetes or renal disease not recommended schedule) Requires referral by treating physician for patient with diabetes or renal disease. Can be provided in same year as diabetes self-management training (DSMT), and CMS recommends medical nutrition therapy take place after DSMT. Up to 3 hours for initial year and 2 hours in subsequent years. na  
Shingles Vaccination A shingles vaccine is also recommended once in a lifetime after age 61  need Seasonal Influenza Vaccination (annually)   9/13/17 Pneumococcal Vaccination (once after 65)   6/11/14-Pneu 4/19/17 Prevnar 13 Hepatitis B Vaccinations (if medium/high risk) Medium/high risk factors:  End-stage renal disease, Hemophiliacs who received Factor VIII or IX concentrates, Clients of institutions for the mentally retarded, Persons who live in the same house as a HepB virus carrier, Homosexual men, Illicit injectable drug abusers. na  
Screening Mammography (biennial age 54-69) Annually (age 36 or over)  9/26/17 Screening Pap Tests and Pelvic Examination (up to age 79 and after 79 if unknown history or abnormal study last 10 years) Every 24 months except high risk  hysterectomy Ultrasound Screening for Abdominal Aortic Aneurysm (AAA) (once) Patient must be referred through IPPE and not have had a screening for abdominal aortic aneurysm before under Medicare. Limited to patients who meet one of the following criteria: 
- Men who are 73-68 years old and have smoked more than 100 cigarettes in their lifetime. 
-Anyone with a FH of AAA 
-Anyone recommended for screening by USPSTF  na  
 
 
  
Learning About the Mediterranean Diet What is the Mediterranean diet? The Mediterranean diet is a style of eating rather than a diet plan. It features foods eaten in [place], [place], [place] and [place], and other countries along the [facility]. It emphasizes eating foods like fish, fruits, vegetables, beans, high-fiber breads and whole grains, nuts, and olive oil. This style of eating includes limited red meat, cheese, and sweets. Why choose the Mediterranean diet? A Mediterranean-style diet may improve heart health. It contains more fat than other heart-healthy diets. But the fats are mainly from nuts, unsaturated oils (such as fish oils and olive oil), and certain nut or seed oils (such as canola, soybean, or flaxseed oil). These fats may help protect the heart and blood vessels. How can you get started on the Mediterranean diet? Here are some things you can do to switch to a more Mediterranean way of eating. What to eat · Eat a variety of fruits and vegetables each day, such as grapes, blueberries, tomatoes, broccoli, peppers, figs, olives, spinach, eggplant, beans, lentils, and chickpeas. · Eat a variety of whole-grain foods each day, such as oats, brown rice, and whole wheat bread, pasta, and couscous. · Eat fish at least 2 times a week. Try tuna, salmon, mackerel, lake trout, herring, or sardines. · Eat moderate amounts of low-fat dairy products, such as milk, cheese, or yogurt. · Eat moderate amounts of poultry and eggs. · Choose healthy (unsaturated) fats, such as nuts, olive oil, and certain nut or seed oils like canola, soybean, and flaxseed. · Limit unhealthy (saturated) fats, such as butter, palm oil, and coconut oil. And limit fats found in animal products, such as meat and dairy products made with whole milk. Try to eat red meat only a few times a month in very small amounts. · Limit sweets and desserts to only a few times a week. This includes sugar-sweetened drinks like soda. The Mediterranean diet may also include red wine with your meal-1 glass each day for women and up to 2 glasses a day for men. Tips for eating at home · Use herbs, spices, garlic, lemon zest, and citrus juice instead of salt to add flavor to foods. · Add avocado slices to your sandwich instead of guzman. · Have fish for lunch or dinner instead of red meat. Brush the fish with olive oil, and broil or grill it. · Sprinkle your salad with seeds or nuts instead of cheese. · Cook with olive or canola oil instead of butter or oils that are high in saturated fat. · Switch from 2% milk or whole milk to 1% or fat-free milk. · Dip raw vegetables in a vinaigrette dressing or hummus instead of dips made from mayonnaise or sour cream. 
· Have a piece of fruit for dessert instead of a piece of cake. Try baked apples, or have some dried fruit. Tips for eating out · Try broiled, grilled, baked, or poached fish instead of having it fried or breaded. · Ask your  to have your meals prepared with olive oil instead of butter. · Order dishes made with marinara sauce or sauces made from olive oil. Avoid sauces made from cream or mayonnaise. · Choose whole-grain breads, whole wheat pasta and pizza crust, brown rice, beans, and lentils. · Cut back on butter or margarine on bread. Instead, you can dip your bread in a small amount of olive oil. · Ask for a side salad or grilled vegetables instead of french fries or chips. Where can you learn more? Go to http://sravan-arcadio.info/. Enter [phone] in the search box to learn more about \"Learning About the Mediterranean Diet. \" Current as of: May 12, 2017 Content Version: 11.4 © 3320-0965 Healthwise, [employer]. Care instructions adapted under license by [employer] (which disclaims liability or warranty for this information).  If you have questions about a medical condition or this instruction, always ask your healthcare professional. Lissa Smith [employer] disclaims any warranty or liability for your use of this information. Hemoglobin A1c: About This Test 
What is it? Hemoglobin A1c is a blood test that checks your average blood sugar level over the past 2 to 3 months. This test also is called a glycohemoglobin test or an A1c test. 
Why is this test done? The A1c test is done to check how well your diabetes has been controlled over the past 2 to 3 months. Your doctor can use this information to adjust your medicine and diabetes treatment, if needed. How can you prepare for the test? 
You do not need to stop eating before you have an A1c test. This test can be done at any time during the day, even after a meal. 
What happens during the test? 
The health professional taking a sample of your blood will: · Wrap an elastic band around your upper arm. This makes the veins below the band larger so it is easier to put a needle into the vein. · Clean the needle site with alcohol. · Put the needle into the vein. · Attach a tube to the needle to fill it with blood. · Remove the band from your arm when enough blood is collected. · Put a gauze pad or cotton ball over the needle site as the needle is removed. · Put pressure on the site and then put on a bandage. What else should you know about the test? 
The test result is usually given as a percentage. The normal A1c is less than 5.7%. The A1c test result also can be used to find your estimated average glucose, or eAG. Your eAG and A1c show the same thing in two different ways. They both help you learn more about your average blood sugar range over the past 2 to 3 months. Where can you learn more? Go to http://sravan-arcadio.info/. Enter U216 in the search box to learn more about \"Hemoglobin A1c: About This Test.\" Current as of: March 13, 2017 Content Version: 11.4 © 0394-6971 [employer]wise, [employer].  Care instructions adapted under license by [address] (which disclaims liability or warranty for this information). If you have questions about a medical condition or this instruction, always ask your healthcare professional. [address] any warranty or liability for your use of this information. Introducing [address]! [facility] introduces [employer] [employer] patient portal. Now you can access parts of your medical record, email your doctor's office, and request medication refills online. 1. In your internet browser, go to [email or web address] 2. Click on the First Time User? Click Here link in the Sign In box. You will see the New Member Sign Up page. 3. Enter your [employer] Access Code exactly as it appears below. You will not need to use this code after youve completed the sign-up process. If you do not sign up before the expiration date, you must request a new code. · [employer] Access Code: [email or web address] Expires: 4/9/2018  9:27 AM 
 
4. Enter the last four digits of your Social Security Number (xxxx) and Date of Birth (mm/dd/yyyy) as indicated and click Submit. You will be taken to the next sign-up page. 5. Create a [facility] ID. This will be your [employer] [facility] login ID and cannot be changed, so think of one that is secure and easy to remember. 6. Create a [employer] password. You can change your password at any time. 7. Enter your Password Reset Question and Answer. This can be used at a later time if you forget your password. 8. Enter your e-mail address. You will receive e-mail notification when new information is available in [address] 9. Click Sign Up. You can now view and download portions of your medical record. 10. Click the Download Summary menu link to download a portable copy of your medical information. If you have questions, please visit the Frequently Asked Questions section of the [employer] website.  Remember, [employer] is NOT to be used for urgent needs. For medical emergencies, dial 911. Now available from your iPhone and Android! Please provide this summary of care documentation to your next provider. Your primary care clinician is listed as Elliot Richmond. If you have any questions after today's visit, please call [phone].

## 2018-01-24 NOTE — PATIENT INSTRUCTIONS
Hemoglobin A1c: About This Test  What is it? Hemoglobin A1c is a blood test that checks your average blood sugar level over the past 2 to 3 months. This test also is called a glycohemoglobin test or an A1c test.  Why is this test done? The A1c test is done to check how well your diabetes has been controlled over the past 2 to 3 months. Your doctor can use this information to adjust your medicine and diabetes treatment, if needed. How can you prepare for the test?  You do not need to stop eating before you have an A1c test. This test can be done at any time during the day, even after a meal.  What happens during the test?  The health professional taking a sample of your blood will:  · Wrap an elastic band around your upper arm. This makes the veins below the band larger so it is easier to put a needle into the vein. · Clean the needle site with alcohol. · Put the needle into the vein. · Attach a tube to the needle to fill it with blood. · Remove the band from your arm when enough blood is collected. · Put a gauze pad or cotton ball over the needle site as the needle is removed. · Put pressure on the site and then put on a bandage. What else should you know about the test?  The test result is usually given as a percentage. The normal A1c is less than 5.7%. The A1c test result also can be used to find your estimated average glucose, or eAG. Your eAG and A1c show the same thing in two different ways. They both help you learn more about your average blood sugar range over the past 2 to 3 months. Where can you learn more? Go to http://sravan-arcadio.info/. Enter U216 in the search box to learn more about \"Hemoglobin A1c: About This Test.\"  Current as of: March 13, 2017  Content Version: 11.4  © 2173-6870 [employer]. Care instructions adapted under license by [facility] (which disclaims liability or warranty for this information).  If you have questions about a medical condition or this instruction, always ask your healthcare professional. [place] [address] any warranty or liability for your use of this information. Medicare Wellness Visit, Female    The best way to live healthy is to have a healthy lifestyle by eating a well-balanced diet, exercising regularly, limiting alcohol and stopping smoking. Regular physical exams and screening tests are another way to keep healthy. Preventive exams provided by your health care provider can find health problems before they become diseases or illnesses. Preventive services including immunizations, screening tests, monitoring and exams can help you take care of your own health. All people over age 72 should have a pneumovax  and and a prevnar shot to prevent pneumonia. These are once in a lifetime unless you and your provider decide differently. All people over 65 should have a yearly flu shot and a tetanus vaccine every 10 years. A bone mass density to screen for osteoporosis or thinning of the bones should be done every 2 years after 65. Screening for diabetes mellitus with a blood sugar test should be done every year. Glaucoma is a disease of the eye due to increased ocular pressure that can lead to blindness and it should be done every year by an eye professional.    Cardiovascular screening tests that check for elevated lipids (fatty part of blood) which can lead to heart disease and strokes should be done every 5 years. Colorectal screening that evaluates for blood or polyps in your colon should be done yearly as a stool test or every five years as a flexible sigmoidoscope or every 10 years as a colonoscopy up to age 76. Breast cancer screening with a mammogram is recommended biennially  for women age 54-69.     Screening for cervical cancer with a pap smear and pelvic exam is recommended for women after age 72 years every 2 years up to age 79 or when the provider and patient decide to stop. If there is a history of cervical abnormalities or other increased risk for cancer then the test is recommended yearly. Hepatitis C screening is also recommended for anyone born between 80 through [address]. A shingles vaccine is also recommended once in a lifetime after age 61. Your Medicare Wellness Exam is recommended annually. Here is a list of your current Health Maintenance items with a due date:  Health Maintenance Due   Topic Date Due    Annual Well Visit  10/12/2017       Medicare Part B Preventive Services Limitations Recommendation Scheduled   Bone Mass Measurement  (age 72 & older, biennial) Requires diagnosis related to osteoporosis or estrogen deficiency. Biennial benefit unless patient has history of long-term glucocorticoid tx or baseline is needed because initial test was by other method Needs    Last test  6/2/16 ordered   Cardiovascular Screening Blood Tests (every 5 years)  Total cholesterol, HDL, Triglycerides Order as a panel if possible  Had 1/17/18   Colorectal Cancer Screening  -Fecal occult blood test (annual)  -Flexible sigmoidoscopy (5y)  -Screening colonoscopy (10y)  -Barium Enema   Had  7/23/09   Counseling to Prevent Tobacco Use (up to 8 sessions per year)  - Counseling greater than 3 and up to 10 minutes  - Counseling greater than 10 minutes Patients must be asymptomatic of tobacco-related conditions to receive as preventive service  N/a   Diabetes Screening Tests (at least every 3 years, Medicare covers annually or at 6-month intervals for prediabetic patients)    Fasting blood sugar (FBS) or glucose tolerance test (GTT) Patient must be diagnosed with one of the following:  -Hypertension, Dyslipidemia, obesity, previous impaired FBS or GTT  Or any two of the following: overweight, FH of diabetes, age ? 72, history of gestational diabetes, birth of baby weighing more than 9 pounds  1/17/18   Diabetes Self-Management Training (DSMT) (no USPSTF recommendation) Requires referral by treating physician for patient with diabetes or renal disease. 10 hours of initial DSMT session of no less than 30 minutes each in a continuous 12-month period. 2 hours of follow-up DSMT in subsequent years. na   Glaucoma Screening (no USPSTF recommendation) Diabetes mellitus, family history, , age 48 or over,  American, age 72 or over  3/20/17   Human Immunodeficiency Virus (HIV) Screening (annually for increased risk patients)  HIV-1 and HIV-2 by EIA, JOE, rapid antibody test, or oral mucosa transudate Patient must be at increased risk for HIV infection per USPSTF guidelines or pregnant. Tests covered annually for patients at increased risk. Pregnant patients may receive up to 3 test during pregnancy. na   Medical Nutrition Therapy (MNT) (for diabetes or renal disease not recommended schedule) Requires referral by treating physician for patient with diabetes or renal disease. Can be provided in same year as diabetes self-management training (DSMT), and CMS recommends medical nutrition therapy take place after DSMT. Up to 3 hours for initial year and 2 hours in subsequent years. na   Shingles Vaccination A shingles vaccine is also recommended once in a lifetime after age 61  need   Seasonal Influenza Vaccination (annually)   9/13/17   Pneumococcal Vaccination (once after 72)   6/11/14-Pneu    4/19/17 Prevnar 13   Hepatitis B Vaccinations (if medium/high risk) Medium/high risk factors:  End-stage renal disease,  Hemophiliacs who received Factor VIII or IX concentrates, Clients of institutions for the mentally retarded, Persons who live in the same house as a HepB virus carrier, Homosexual men, Illicit injectable drug abusers.   na   Screening Mammography (biennial age 54-69) Annually (age 36 or over)  9/26/17   Screening Pap Tests and Pelvic Examination (up to age 79 and after 79 if unknown history or abnormal study last 10 years) Every 24 months except high risk hysterectomy   Ultrasound Screening for Abdominal Aortic Aneurysm (AAA) (once) Patient must be referred through IPPE and not have had a screening for abdominal aortic aneurysm before under Medicare. Limited to patients who meet one of the following criteria:  - Men who are 73-68 years old and have smoked more than 100 cigarettes in their lifetime.  -Anyone with a FH of AAA  -Anyone recommended for screening by USPSTF  na          Learning About the Mediterranean Diet  What is the Mediterranean diet? The Mediterranean diet is a style of eating rather than a diet plan. It features foods eaten in [place], [place], [place] and [place], and other countries along the [facility]. It emphasizes eating foods like fish, fruits, vegetables, beans, high-fiber breads and whole grains, nuts, and olive oil. This style of eating includes limited red meat, cheese, and sweets. Why choose the Mediterranean diet? A Mediterranean-style diet may improve heart health. It contains more fat than other heart-healthy diets. But the fats are mainly from nuts, unsaturated oils (such as fish oils and olive oil), and certain nut or seed oils (such as canola, soybean, or flaxseed oil). These fats may help protect the heart and blood vessels. How can you get started on the Mediterranean diet? Here are some things you can do to switch to a more Mediterranean way of eating. What to eat  · Eat a variety of fruits and vegetables each day, such as grapes, blueberries, tomatoes, broccoli, peppers, figs, olives, spinach, eggplant, beans, lentils, and chickpeas. · Eat a variety of whole-grain foods each day, such as oats, brown rice, and whole wheat bread, pasta, and couscous. · Eat fish at least 2 times a week. Try tuna, salmon, mackerel, lake trout, herring, or sardines. · Eat moderate amounts of low-fat dairy products, such as milk, cheese, or yogurt. · Eat moderate amounts of poultry and eggs.   · Choose healthy (unsaturated) fats, such as nuts, olive oil, and certain nut or seed oils like canola, soybean, and flaxseed. · Limit unhealthy (saturated) fats, such as butter, palm oil, and coconut oil. And limit fats found in animal products, such as meat and dairy products made with whole milk. Try to eat red meat only a few times a month in very small amounts. · Limit sweets and desserts to only a few times a week. This includes sugar-sweetened drinks like soda. The Mediterranean diet may also include red wine with your meal-1 glass each day for women and up to 2 glasses a day for men. Tips for eating at home  · Use herbs, spices, garlic, lemon zest, and citrus juice instead of salt to add flavor to foods. · Add avocado slices to your sandwich instead of guzman. · Have fish for lunch or dinner instead of red meat. Brush the fish with olive oil, and broil or grill it. · Sprinkle your salad with seeds or nuts instead of cheese. · Cook with olive or canola oil instead of butter or oils that are high in saturated fat. · Switch from 2% milk or whole milk to 1% or fat-free milk. · Dip raw vegetables in a vinaigrette dressing or hummus instead of dips made from mayonnaise or sour cream.  · Have a piece of fruit for dessert instead of a piece of cake. Try baked apples, or have some dried fruit. Tips for eating out  · Try broiled, grilled, baked, or poached fish instead of having it fried or breaded. · Ask your  to have your meals prepared with olive oil instead of butter. · Order dishes made with marinara sauce or sauces made from olive oil. Avoid sauces made from cream or mayonnaise. · Choose whole-grain breads, whole wheat pasta and pizza crust, brown rice, beans, and lentils. · Cut back on butter or margarine on bread. Instead, you can dip your bread in a small amount of olive oil. · Ask for a side salad or grilled vegetables instead of french fries or chips. Where can you learn more?   Go to http://sravan-arcadio.info/. Enter [phone] in the search box to learn more about \"Learning About the Mediterranean Diet. \"  Current as of: May 12, 2017  Content Version: 11.4  © 3828-3975 [employer]. Care instructions adapted under license by [employer] (which disclaims liability or warranty for this information). If you have questions about a medical condition or this instruction, always ask your healthcare professional. [address] any warranty or liability for your use of this information. Hemoglobin A1c: About This Test  What is it? Hemoglobin A1c is a blood test that checks your average blood sugar level over the past 2 to 3 months. This test also is called a glycohemoglobin test or an A1c test.  Why is this test done? The A1c test is done to check how well your diabetes has been controlled over the past 2 to 3 months. Your doctor can use this information to adjust your medicine and diabetes treatment, if needed. How can you prepare for the test?  You do not need to stop eating before you have an A1c test. This test can be done at any time during the day, even after a meal.  What happens during the test?  The health professional taking a sample of your blood will:  · Wrap an elastic band around your upper arm. This makes the veins below the band larger so it is easier to put a needle into the vein. · Clean the needle site with alcohol. · Put the needle into the vein. · Attach a tube to the needle to fill it with blood. · Remove the band from your arm when enough blood is collected. · Put a gauze pad or cotton ball over the needle site as the needle is removed. · Put pressure on the site and then put on a bandage. What else should you know about the test?  The test result is usually given as a percentage. The normal A1c is less than 5.7%. The A1c test result also can be used to find your estimated average glucose, or eAG.  Your eAG and A1c show the same thing in two different ways. They both help you learn more about your average blood sugar range over the past 2 to 3 months. Where can you learn more? Go to http://sravan-arcadio.info/. Enter U216 in the search box to learn more about \"Hemoglobin A1c: About This Test.\"  Current as of: March 13, 2017  Content Version: 11.4  © 1879-6240 [employer]. Care instructions adapted under license by [employer] (which disclaims liability or warranty for this information). If you have questions about a medical condition or this instruction, always ask your healthcare professional. [address] any warranty or liability for your use of this information.

## 2018-01-29 ENCOUNTER — HOSPITAL ENCOUNTER (OUTPATIENT)
Dept: RADIATION THERAPY | Age: 68
Discharge: HOME | End: 2018-01-29
Payer: COMMERCIAL

## 2018-01-29 PROCEDURE — 77334 RADIATION TREATMENT AID(S): CPT

## 2018-01-29 PROCEDURE — 77295 3-D RADIOTHERAPY PLAN: CPT

## 2018-01-29 PROCEDURE — 77300 RADIATION THERAPY DOSE PLAN: CPT

## 2018-01-30 ENCOUNTER — HOSPITAL ENCOUNTER (OUTPATIENT)
Dept: RADIATION THERAPY | Age: 68
Discharge: HOME | End: 2018-01-30
Payer: COMMERCIAL

## 2018-01-30 PROCEDURE — 77280 THER RAD SIMULAJ FIELD SMPL: CPT

## 2018-01-31 ENCOUNTER — HOSPITAL ENCOUNTER (OUTPATIENT)
Dept: RADIATION THERAPY | Age: 68
Discharge: HOME | End: 2018-01-31
Payer: COMMERCIAL

## 2018-01-31 PROCEDURE — 77412 RADIATION TX DELIVERY LVL 3: CPT

## 2018-02-01 ENCOUNTER — HOSPITAL ENCOUNTER (OUTPATIENT)
Dept: RADIATION THERAPY | Age: 68
Discharge: HOME | End: 2018-02-01
Payer: COMMERCIAL

## 2018-02-01 PROCEDURE — 77412 RADIATION TX DELIVERY LVL 3: CPT

## 2018-02-02 ENCOUNTER — HOSPITAL ENCOUNTER (OUTPATIENT)
Dept: RADIATION THERAPY | Age: 68
Discharge: HOME | End: 2018-02-02
Payer: COMMERCIAL

## 2018-02-02 PROCEDURE — 77412 RADIATION TX DELIVERY LVL 3: CPT

## 2018-02-05 ENCOUNTER — HOSPITAL ENCOUNTER (OUTPATIENT)
Dept: RADIATION THERAPY | Age: 68
Discharge: HOME | End: 2018-02-05
Payer: COMMERCIAL

## 2018-02-05 PROCEDURE — 77412 RADIATION TX DELIVERY LVL 3: CPT

## 2018-02-06 ENCOUNTER — HOSPITAL ENCOUNTER (OUTPATIENT)
Dept: RADIATION THERAPY | Age: 68
Discharge: HOME | End: 2018-02-06
Payer: COMMERCIAL

## 2018-02-06 PROCEDURE — 77412 RADIATION TX DELIVERY LVL 3: CPT

## 2018-02-06 PROCEDURE — 77336 RADIATION PHYSICS CONSULT: CPT

## 2018-02-07 ENCOUNTER — HOSPITAL ENCOUNTER (OUTPATIENT)
Dept: RADIATION THERAPY | Age: 68
Discharge: HOME | End: 2018-02-07
Payer: COMMERCIAL

## 2018-02-07 PROCEDURE — 77417 THER RADIOLOGY PORT IMAGE(S): CPT

## 2018-02-07 PROCEDURE — 77412 RADIATION TX DELIVERY LVL 3: CPT

## 2018-02-08 ENCOUNTER — HOSPITAL ENCOUNTER (OUTPATIENT)
Dept: RADIATION THERAPY | Age: 68
Discharge: HOME | End: 2018-02-08
Payer: COMMERCIAL

## 2018-02-08 PROCEDURE — 77412 RADIATION TX DELIVERY LVL 3: CPT

## 2018-02-09 ENCOUNTER — HOSPITAL ENCOUNTER (OUTPATIENT)
Dept: RADIATION THERAPY | Age: 68
Discharge: HOME | End: 2018-02-09
Payer: COMMERCIAL

## 2018-02-09 PROCEDURE — 77412 RADIATION TX DELIVERY LVL 3: CPT

## 2018-02-12 ENCOUNTER — HOSPITAL ENCOUNTER (OUTPATIENT)
Dept: RADIATION THERAPY | Age: 68
Discharge: HOME | End: 2018-02-12
Payer: COMMERCIAL

## 2018-02-12 PROCEDURE — 77412 RADIATION TX DELIVERY LVL 3: CPT

## 2018-02-13 ENCOUNTER — HOSPITAL ENCOUNTER (OUTPATIENT)
Dept: RADIATION THERAPY | Age: 68
Discharge: HOME | End: 2018-02-13
Payer: COMMERCIAL

## 2018-02-13 PROCEDURE — 77336 RADIATION PHYSICS CONSULT: CPT

## 2018-02-13 PROCEDURE — 77412 RADIATION TX DELIVERY LVL 3: CPT

## 2018-02-14 ENCOUNTER — HOSPITAL ENCOUNTER (OUTPATIENT)
Dept: RADIATION THERAPY | Age: 68
Discharge: HOME | End: 2018-02-14
Payer: COMMERCIAL

## 2018-02-14 PROCEDURE — 77412 RADIATION TX DELIVERY LVL 3: CPT

## 2018-02-14 PROCEDURE — 77417 THER RADIOLOGY PORT IMAGE(S): CPT

## 2018-02-15 ENCOUNTER — HOSPITAL ENCOUNTER (OUTPATIENT)
Dept: RADIATION THERAPY | Age: 68
Discharge: HOME | End: 2018-02-15
Payer: COMMERCIAL

## 2018-02-15 PROCEDURE — 77412 RADIATION TX DELIVERY LVL 3: CPT

## 2018-02-16 ENCOUNTER — HOSPITAL ENCOUNTER (OUTPATIENT)
Dept: RADIATION THERAPY | Age: 68
Discharge: HOME | End: 2018-02-16
Payer: COMMERCIAL

## 2018-02-16 ENCOUNTER — DOCUMENTATION ONLY (OUTPATIENT)
Dept: SURGERY | Age: 68
End: 2018-02-16

## 2018-02-16 PROCEDURE — 77412 RADIATION TX DELIVERY LVL 3: CPT

## 2018-02-16 NOTE — PROGRESS NOTES
Met w/ pt today following her radiation appt. Pt completed distress screen (see attached). Pt denies and needs or concerns at this time. Gave pt my contact info and encouraged her to call with questions or concerns.

## 2018-02-19 ENCOUNTER — HOSPITAL ENCOUNTER (OUTPATIENT)
Dept: RADIATION THERAPY | Age: 68
Discharge: HOME | End: 2018-02-19
Payer: COMMERCIAL

## 2018-02-19 PROCEDURE — 77412 RADIATION TX DELIVERY LVL 3: CPT

## 2018-02-20 ENCOUNTER — HOSPITAL ENCOUNTER (OUTPATIENT)
Dept: RADIATION THERAPY | Age: 68
Discharge: HOME | End: 2018-02-20
Payer: COMMERCIAL

## 2018-02-20 PROCEDURE — 77412 RADIATION TX DELIVERY LVL 3: CPT

## 2018-02-20 PROCEDURE — 77336 RADIATION PHYSICS CONSULT: CPT

## 2018-02-21 ENCOUNTER — HOSPITAL ENCOUNTER (OUTPATIENT)
Dept: RADIATION THERAPY | Age: 68
Discharge: HOME | End: 2018-02-21
Payer: COMMERCIAL

## 2018-02-21 PROCEDURE — 77412 RADIATION TX DELIVERY LVL 3: CPT

## 2018-02-22 ENCOUNTER — HOSPITAL ENCOUNTER (OUTPATIENT)
Dept: RADIATION THERAPY | Age: 68
Discharge: HOME | End: 2018-02-22
Payer: COMMERCIAL

## 2018-02-22 PROCEDURE — 77417 THER RADIOLOGY PORT IMAGE(S): CPT

## 2018-02-22 PROCEDURE — 77412 RADIATION TX DELIVERY LVL 3: CPT

## 2018-02-23 ENCOUNTER — HOSPITAL ENCOUNTER (OUTPATIENT)
Dept: RADIATION THERAPY | Age: 68
Discharge: HOME | End: 2018-02-23
Payer: COMMERCIAL

## 2018-02-23 PROCEDURE — 77412 RADIATION TX DELIVERY LVL 3: CPT

## 2018-02-26 ENCOUNTER — HOSPITAL ENCOUNTER (OUTPATIENT)
Dept: RADIATION THERAPY | Age: 68
Discharge: HOME | End: 2018-02-26
Payer: COMMERCIAL

## 2018-02-26 PROCEDURE — 77412 RADIATION TX DELIVERY LVL 3: CPT

## 2018-02-27 ENCOUNTER — HOSPITAL ENCOUNTER (OUTPATIENT)
Dept: RADIATION THERAPY | Age: 68
Discharge: HOME | End: 2018-02-27
Payer: COMMERCIAL

## 2018-02-27 PROCEDURE — 77336 RADIATION PHYSICS CONSULT: CPT

## 2018-02-27 PROCEDURE — 77412 RADIATION TX DELIVERY LVL 3: CPT

## 2018-02-28 ENCOUNTER — HOSPITAL ENCOUNTER (OUTPATIENT)
Dept: RADIATION THERAPY | Age: 68
Discharge: HOME | End: 2018-02-28
Payer: COMMERCIAL

## 2018-02-28 PROCEDURE — 77417 THER RADIOLOGY PORT IMAGE(S): CPT

## 2018-02-28 PROCEDURE — 77412 RADIATION TX DELIVERY LVL 3: CPT

## 2018-03-01 ENCOUNTER — HOSPITAL ENCOUNTER (OUTPATIENT)
Dept: RADIATION THERAPY | Age: 68
Discharge: HOME | End: 2018-03-01
Payer: COMMERCIAL

## 2018-03-01 PROCEDURE — 77412 RADIATION TX DELIVERY LVL 3: CPT

## 2018-03-01 PROCEDURE — 77331 SPECIAL RADIATION DOSIMETRY: CPT

## 2018-03-02 ENCOUNTER — HOSPITAL ENCOUNTER (OUTPATIENT)
Dept: RADIATION THERAPY | Age: 68
Discharge: HOME | End: 2018-03-02
Payer: COMMERCIAL

## 2018-03-02 PROCEDURE — 77334 RADIATION TREATMENT AID(S): CPT

## 2018-03-02 PROCEDURE — 77321 SPECIAL TELETX PORT PLAN: CPT

## 2018-03-02 PROCEDURE — 77412 RADIATION TX DELIVERY LVL 3: CPT

## 2018-03-05 ENCOUNTER — HOSPITAL ENCOUNTER (OUTPATIENT)
Dept: RADIATION THERAPY | Age: 68
Discharge: HOME | End: 2018-03-05
Payer: COMMERCIAL

## 2018-03-05 PROCEDURE — 77412 RADIATION TX DELIVERY LVL 3: CPT

## 2018-03-06 ENCOUNTER — HOSPITAL ENCOUNTER (OUTPATIENT)
Dept: RADIATION THERAPY | Age: 68
Discharge: HOME | End: 2018-03-06
Payer: COMMERCIAL

## 2018-03-06 PROCEDURE — 77412 RADIATION TX DELIVERY LVL 3: CPT

## 2018-03-07 ENCOUNTER — HOSPITAL ENCOUNTER (OUTPATIENT)
Dept: RADIATION THERAPY | Age: 68
Discharge: HOME | End: 2018-03-07
Payer: COMMERCIAL

## 2018-03-07 ENCOUNTER — HOSPITAL ENCOUNTER (OUTPATIENT)
Dept: NON INVASIVE DIAGNOSTICS | Age: 68
Discharge: HOME | End: 2018-03-07
Attending: INTERNAL MEDICINE
Payer: COMMERCIAL

## 2018-03-07 DIAGNOSIS — I51.81 TAKOTSUBO CARDIOMYOPATHY: ICD-10-CM

## 2018-03-07 PROCEDURE — 77412 RADIATION TX DELIVERY LVL 3: CPT

## 2018-03-07 PROCEDURE — 93306 TTE W/DOPPLER COMPLETE: CPT

## 2018-03-07 PROCEDURE — 77280 THER RAD SIMULAJ FIELD SMPL: CPT

## 2018-03-08 ENCOUNTER — HOSPITAL ENCOUNTER (OUTPATIENT)
Dept: RADIATION THERAPY | Age: 68
Discharge: HOME | End: 2018-03-08
Payer: COMMERCIAL

## 2018-03-08 PROCEDURE — 77412 RADIATION TX DELIVERY LVL 3: CPT

## 2018-03-09 ENCOUNTER — HOSPITAL ENCOUNTER (OUTPATIENT)
Dept: RADIATION THERAPY | Age: 68
Discharge: HOME | End: 2018-03-09
Payer: COMMERCIAL

## 2018-03-09 PROCEDURE — 77412 RADIATION TX DELIVERY LVL 3: CPT

## 2018-03-12 ENCOUNTER — HOSPITAL ENCOUNTER (OUTPATIENT)
Dept: RADIATION THERAPY | Age: 68
Discharge: HOME | End: 2018-03-12
Payer: COMMERCIAL

## 2018-03-12 PROCEDURE — 77412 RADIATION TX DELIVERY LVL 3: CPT

## 2018-03-13 ENCOUNTER — HOSPITAL ENCOUNTER (OUTPATIENT)
Dept: RADIATION THERAPY | Age: 68
Discharge: HOME | End: 2018-03-13
Payer: COMMERCIAL

## 2018-03-13 PROCEDURE — 77412 RADIATION TX DELIVERY LVL 3: CPT

## 2018-03-13 PROCEDURE — 77336 RADIATION PHYSICS CONSULT: CPT

## 2018-03-20 DIAGNOSIS — M1A.9XX0 CHRONIC GOUT WITHOUT TOPHUS, UNSPECIFIED CAUSE, UNSPECIFIED SITE: ICD-10-CM

## 2018-03-20 RX ORDER — ALLOPURINOL 300 MG/1
TABLET ORAL
Qty: 90 TAB | Refills: 1 | Status: SHIPPED | OUTPATIENT
Start: 2018-03-20

## 2018-03-26 ENCOUNTER — OFFICE VISIT (OUTPATIENT)
Dept: CARDIOLOGY CLINIC | Age: 68
End: 2018-03-26

## 2018-03-26 VITALS
OXYGEN SATURATION: 96 % | SYSTOLIC BLOOD PRESSURE: 130 MMHG | HEART RATE: 66 BPM | HEIGHT: 65 IN | BODY MASS INDEX: 33.82 KG/M2 | WEIGHT: 203 LBS | DIASTOLIC BLOOD PRESSURE: 60 MMHG

## 2018-03-26 DIAGNOSIS — I51.81 TAKOTSUBO CARDIOMYOPATHY: Primary | ICD-10-CM

## 2018-03-26 DIAGNOSIS — E78.1 HYPERTRIGLYCERIDEMIA: ICD-10-CM

## 2018-03-26 DIAGNOSIS — I10 ESSENTIAL HYPERTENSION: ICD-10-CM

## 2018-03-26 RX ORDER — CARVEDILOL 3.12 MG/1
3.12 TABLET ORAL 2 TIMES DAILY WITH MEALS
COMMUNITY
End: 2018-05-02 | Stop reason: SDUPTHER

## 2018-03-26 NOTE — MR AVS SNAPSHOT
[address] [place] [address] [place] [address] 
[phone] Patient: Lolly Calles MRN: [id number] ADT:4/9/4252 Visit Information Date & Time Provider Department Dept. Phone Encounter #  
 3/26/2018  1:40 PM Steven Mejia MD [facility] [id number] Follow-up Instructions Follow-up and Disposition History Your Appointments 4/11/2018  1:30 PM  
Office Visit with Franklin Torres MD  
[address]) Appt Note: 3 month f/u  
 [address] [facility] [address] [facility]  
[phone]  
  
   
 [address] [address]  
  
    
 4/25/2018  7:15 AM  
LAB with [facility] NURSE [facility] ([facility]) Appt Note: lab [address] [facility] [address] [address]  
[phone]  
  
   
 [address], [facility]  
  
    
 5/2/2018  2:00 PM  
Office Visit with Kaye Gray PA-C [facility] ([facility]) Appt Note: 4 m fu; .  
 [address], [facility] [address] [address]  
[phone]  
  
   
 [address],  [address] Upcoming Health Maintenance Date Due  
 EYE EXAM RETINAL OR DILATED Q1 3/20/2018 HEMOGLOBIN A1C Q6M 7/17/2018 FOOT EXAM Q1 9/13/2018 MICROALBUMIN Q1 9/13/2018 LIPID PANEL Q1 1/17/2019 GLAUCOMA SCREENING Q2Y 3/20/2019 COLONOSCOPY 7/23/2019 BREAST CANCER SCRN MAMMOGRAM 10/11/2019 DTaP/Tdap/Td series (2 - Td) 9/13/2027 Allergies as of 3/26/2018  Review Complete On: 3/26/2018 By: Steven Mejia MD  
  
 Severity Noted Reaction Type Reactions Sulfa (Sulfonamide Antibiotics)  06/22/2010    Other (comments)  
 nightmares Current Immunizations  Reviewed on 10/13/2014 Name Date Influenza High Dose Vaccine PF 9/13/2017 Influenza Vaccine 10/13/2014, 12/16/2013, 12/24/2012 Influenza Vaccine (Quad) PF 10/11/2016, 12/1/2015  2:30 PM  
 Influenza Vaccine Split 11/30/2011 Pneumococcal Conjugate (PCV-13) 4/19/2017 Pneumococcal Polysaccharide (PPSV-23) 6/11/2014 TD Vaccine 10/8/2008 Tdap 9/13/2017 Not reviewed this visit You Were Diagnosed With   
  
 Codes Comments Takotsubo cardiomyopathy    -  Primary ICD-10-CM: I51.81 
ICD-9-CM: 429.83 Hypertriglyceridemia     ICD-10-CM: E78.1 ICD-9-CM: 272.1 Essential hypertension     ICD-10-CM: I10 
ICD-9-CM: 401.9 Vitals BP Pulse Height(growth percentile) Weight(growth percentile) SpO2 BMI  
 130/60 66 5' 5\" (1.651 m) 203 lb (92.1 kg) 96% 33.78 kg/m2 OB Status Smoking Status Hysterectomy Never Smoker Vitals History BMI and BSA Data Body Mass Index Body Surface Area 33.78 kg/m 2 2.06 m 2 Preferred Pharmacy Pharmacy Name [facility], [address] [place] [address] [phone] Your Updated Medication List  
  
   
This list is accurate as of 3/26/18  2:50 PM.  Always use your most recent med list.  
  
  
  
  
 allopurinol 300 mg tablet Commonly known as:  ZYLOPRIM  
take 1 tablet by mouth once daily  
  
 amLODIPine 5 mg tablet Commonly known as:  Nena Chisholm Take 1 Tab by mouth daily. aspirin delayed-release 81 mg tablet Take  by mouth daily. atorvastatin 80 mg tablet Commonly known as:  LIPITOR  
take 1 tablet by mouth once daily  
  
 carvedilol 3.125 mg tablet Commonly known as:  Kip Evener Take 3.125 mg by mouth two (2) times daily (with meals). cholecalciferol 5,000 unit capsule Commonly known as:  VITAMIN D3 Take 5,000 Units by mouth daily. cyanocobalamin ER 1,000 mcg tablet Commonly known as:  VITAMIN B-12 Take 1 tablet by mouth daily. fenofibrate nanocrystallized 145 mg tablet Commonly known as:  [employer]  
 Take 1 Tab by mouth daily. letrozole 2.5 mg tablet Commonly known as:  [facility] Take 2.5 mg by mouth daily. metFORMIN 1,000 mg tablet Commonly known as:  GLUCOPHAGE Take 1 Tab by mouth two (2) times daily (with meals). potassium chloride 20 mEq tablet Commonly known as:  K-DUR, KLOR-CON  
take 1 tablet by mouth once daily PriLOSEC OTC 20 mg tablet Generic drug:  omeprazole Take 20 mg by mouth daily. SITagliptin 50 mg tablet Commonly known as:  Lynnette Mix Take 1 Tab by mouth daily. Indications: type 2 diabetes mellitus  
  
 valsartan-hydroCHLOROthiazide 160-25 mg per tablet Commonly known as:  DIOVAN-HCT  
take 1 tablet by mouth once daily We Performed the Following AMB POC EKG ROUTINE W/ 12 LEADS, INTER & REP [53886 CPT(R)] Introducing [facility] SERVICES! [facility] introduces [employer] patient portal. Now you can access parts of your medical record, email your doctor's office, and request medication refills online. 1. In your internet browser, go to [email or web address] 2. Click on the First Time User? Click Here link in the Sign In box. You will see the New Member Sign Up page. 3. Enter your [employer] Access Code exactly as it appears below. You will not need to use this code after youve completed the sign-up process. If you do not sign up before the expiration date, you must request a new code. · [employer] Access Code: [email or web address] Expires: 4/9/2018 10:27 AM 
 
4. Enter the last four digits of your Social Security Number (xxxx) and Date of Birth (mm/dd/yyyy) as indicated and click Submit. You will be taken to the next sign-up page. 5. Create a [employer] ID. This will be your [employer] login ID and cannot be changed, so think of one that is secure and easy to remember. 6. Create a [employer] password. You can change your password at any time. 7. Enter your Password Reset Question and Answer.  This can be used at a later time if you forget your password. 8. Enter your e-mail address. You will receive e-mail notification when new information is available in [address] 9. Click Sign Up. You can now view and download portions of your medical record. 10. Click the Download Summary menu link to download a portable copy of your medical information. If you have questions, please visit the Frequently Asked Questions section of the [employer] website. Remember, [employer] is NOT to be used for urgent needs. For medical emergencies, dial 911. Now available from your iPhone and Android! Please provide this summary of care documentation to your next provider. Your primary care clinician is listed as Jolie Zepeda. If you have any questions after today's visit, please call [phone].
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
## 2018-03-26 NOTE — PROGRESS NOTES
1. Have you been to the ER, urgent care clinic since your last visit? Hospitalized since your last visit? Yes, 11/14/17 for left breast biopsy    2. Have you seen or consulted any other health care providers outside of the [address] [place] [address] since your last visit? Include any pap smears or colon screening.   No

## 2018-04-11 ENCOUNTER — OFFICE VISIT (OUTPATIENT)
Dept: SURGERY | Age: 68
End: 2018-04-11

## 2018-04-11 VITALS
BODY MASS INDEX: 33.82 KG/M2 | RESPIRATION RATE: 16 BRPM | OXYGEN SATURATION: 98 % | SYSTOLIC BLOOD PRESSURE: 128 MMHG | HEIGHT: 65 IN | WEIGHT: 203 LBS | DIASTOLIC BLOOD PRESSURE: 64 MMHG | HEART RATE: 66 BPM

## 2018-04-11 DIAGNOSIS — C50.212 MALIGNANT NEOPLASM OF UPPER-INNER QUADRANT OF LEFT FEMALE BREAST, UNSPECIFIED ESTROGEN RECEPTOR STATUS (HCC): Primary | ICD-10-CM

## 2018-04-11 NOTE — PROGRESS NOTES
Progress Note    Patient: Lolly Calles  MRN: [id number]  SSN: [id number]   YOB: 1950  Age: 75 y.o. Sex: female     Chief Complaint   Patient presents with    Follow-up     3 month       HPI    Ms. Lacey Pabon returns for follow-up after her radiation therapy. 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 She is doing well and is quite happy being done with radiation. She is also happy about not having to take chemotherapy. She is back today for wound check and breast exam.  Her exam is good without dominant mass on the left and her wounds are well-healed. We will see her back for her six-month post radiation mammogram in August I think.     Past Medical History:   Diagnosis Date    Arthritis     Candida intertrigo 7/11/2011    [facility] [facility]     GERD (gastroesophageal reflux disease)     Gout     Heart attack ([facility] [address] 08/12/2017    mild (tx'd medically)    Hypercholesterolemia     Hypertension      Past Surgical History:   Procedure Laterality Date    BX/REMV,LYMPH NODE,DEEP AXILL N/A 11/14/2017    Dr. Nikia Juarez      Calcium deposits    HX DILATION AND CURETTAGE      HX HEART CATHETERIZATION  08/17/2017    no blockages    HX HEENT  07/2016    cataract surgery bilaterally    HX HYSTERECTOMY      HX KNEE REPLACEMENT      Right    HX MASTECTOMY Left 11/14/2017    LEFT BREAST LUMPECTOMY WITH NEEDLE LOCALIZATION Maynor Slaughter LYMPH NODE BIOPSY  performed by Jeanie Bowen MD at [address] [place] [address] HX ORTHOPAEDIC      HX RADHA AND BSO      [facility]; 1999    Bladder surgery    CT COLONOSCOPY FLX DX W/COLLJ SPEC WHEN PFRMD  7-23-09    normal;     CT MASTECTOMY, PARTIAL Left 11/14/2017    Dr. Parsons Chick     Allergies   Allergen Reactions    Sulfa (Sulfonamide Antibiotics) Other (comments)     nightmares     Current Outpatient Prescriptions   Medication Sig Dispense Refill    carvedilol (COREG) 3.125 mg tablet Take 3.125 mg by mouth two (2) times daily (with meals).  allopurinol (ZYLOPRIM) 300 mg tablet take 1 tablet by mouth once daily 90 Tab 1    letrozole (FEMARA) 2.5 mg tablet Take 2.5 mg by mouth daily. 0    atorvastatin (LIPITOR) 80 mg tablet take 1 tablet by mouth once daily 90 Tab 1    fenofibrate nanocrystallized (TRICOR) 145 mg tablet Take 1 Tab by mouth daily. 90 Tab 1    potassium chloride (K-DUR, KLOR-CON) 20 mEq tablet take 1 tablet by mouth once daily 90 Tab 1    amLODIPine (NORVASC) 5 mg tablet Take 1 Tab by mouth daily. 90 Tab 3    SITagliptin (JANUVIA) 50 mg tablet Take 1 Tab by mouth daily. Indications: type 2 diabetes mellitus 90 Tab 2    metFORMIN (GLUCOPHAGE) 1,000 mg tablet Take 1 Tab by mouth two (2) times daily (with meals). 180 Tab 3    valsartan-hydroCHLOROthiazide (DIOVAN-HCT) 160-25 mg per tablet take 1 tablet by mouth once daily 90 Tab 1    aspirin delayed-release 81 mg tablet Take  by mouth daily.  cyanocobalamin ER (VITAMIN B-12) 1,000 mcg tablet Take 1 tablet by mouth daily. 90 tablet 3    cholecalciferol (VITAMIN D3) 5,000 unit capsule Take 5,000 Units by mouth daily.  omeprazole (PRILOSEC OTC) 20 mg tablet Take 20 mg by mouth daily. Social History     Social History    Marital status:      Spouse name: N/A    Number of children: N/A    Years of education: N/A     Occupational History    Not on file.      Social History Main Topics    Smoking status: Never Smoker    Smokeless tobacco: Never Used    Alcohol use No    Drug use: No    Sexual activity: Not on file     Other Topics Concern    Not on file     Social History Narrative     Family History   Problem Relation Age of Onset    Hypertension Father     Heart Disease Father     Hypertension Brother     Diabetes Brother     Heart Disease Mother     Breast Cancer Other          Review of systems:  Patient denies any reflux, emesis, abdominal pain, change in bowel habits, hematochezia, melena, fever, weight loss, fatigue chills, dermatitis, abnormal moles, change in vision, vertigo, epistaxis, dysphagia, hoarseness, chest pain, palpitations, hypertension, edema, cough, shortness of breath, wheezing, hemoptysis, snoring, hematuria, diabetes, thyroid disease, anemia, bruising, history of blood transfusion, dizziness, headache, or fainting. Physical Examination    Well developed well nourished female in no apparent distress  Visit Vitals    /64    Pulse 66    Resp 16    Ht 5' 5\" (1.651 m)    Wt 92.1 kg (203 lb)    SpO2 98%    BMI 33.78 kg/m2      Head: normocephalic, atraumatic  Mouth: Clear, no overt lesions, oral mucosa pink and moist  Neck: supple, no masses, no adenopathy or carotid bruits, trachea midline  Resp: clear to auscultation bilaterally, no wheeze, rhonchi or rales, excursions normal and symmetrical  Cardio: Regular rate and rhythm, no murmurs, clicks, gallops or rubs, no edema or varicosities  Abdomen: soft, nontender, nondistended, normoactive bowel sounds, no hernias, no hepatosplenomegaly,   Back: Deferred  Extremeties: warm, well-perfused, no tenderness or swelling, normal gait/station  Neuro: sensation and strength grossly intact and symmetrical  Psych: alert and oriented to person, place and time  Breast exam left breast without dominant mass. Well-healed surgical incisions without lymphadenopathy. Minimal tanning.     IMPRESSION  Stage II left breast cancer for mammogram in 6 months    PLAN  Orders Placed This Encounter    DIAGNOSTIC MAMOGRAM BILATERAL (SYMPTOMS)     Standing Status:   Future     Standing Expiration Date:   12/10/2018     Order Specific Question:   Reason for Exam     Answer:   personal history of breast cancer     As above  Graeme Patel MD

## 2018-04-11 NOTE — MR AVS SNAPSHOT
[address] [facility] [address] 
[phone] Patient: Lolly Calles MRN: [id number] Visit Information Date & Time Provider Department Dept. Phone Encounter #  
 4/11/2018  1:30 PM Yessica Shah MD [facility] [phone] [id number] Your Appointments 4/25/2018  7:15 AM  
LAB with [facility] NURSE [facility] ([facility]) Appt Note: lab 129 [facility] [address] [address]  
[phone]  
  
   
 [address], [address]  
  
    
 5/2/2018  2:00 PM  
Office Visit with Ruth Falk PA-C [facility] ([facility]) Appt Note: 4 m fu; .  
 [address], [address] [address]  
[phone]  
  
   
 [address], [address] [place] [address]  
  
    
 10/1/2018  3:40 PM  
Follow Up with Aziza Bennett MD  
[facility] ([address]) Appt Note: 6 month f/up [facility] [address] [address] [address] [phone] [address] [place] [address] [address] Upcoming Health Maintenance Date Due  
 EYE EXAM RETINAL OR DILATED Q1 3/20/2018 HEMOGLOBIN A1C Q6M 7/17/2018 FOOT EXAM Q1 9/13/2018 MICROALBUMIN Q1 9/13/2018 LIPID PANEL Q1 1/17/2019 MEDICARE YEARLY EXAM 1/25/2019 GLAUCOMA SCREENING Q2Y 3/20/2019 COLONOSCOPY 7/23/2019 BREAST CANCER SCRN MAMMOGRAM 10/11/2019 DTaP/Tdap/Td series (2 - Td) 9/13/2027 Allergies as of 4/11/2018  Review Complete On: 4/11/2018 By: Michelle Rahman LPN Severity Noted Reaction Type Reactions Sulfa (Sulfonamide Antibiotics)  06/22/2010    Other (comments)  
 nightmares Current Immunizations  Reviewed on 10/13/2014 Name Date Influenza High Dose Vaccine PF 9/13/2017 Influenza Vaccine 10/13/2014, 12/16/2013, 12/24/2012 Influenza Vaccine (Quad) PF 10/11/2016, 12/1/2015  2:30 PM  
 Influenza Vaccine Split 11/30/2011 Pneumococcal Conjugate (PCV-13) 4/19/2017 Pneumococcal Polysaccharide (PPSV-23) 6/11/2014 TD Vaccine 10/8/2008 Tdap 9/13/2017 Not reviewed this visit Vitals Height(growth percentile) Weight(growth percentile) BMI OB Status Smoking Status 5' 5\" (1.651 m) 203 lb (92.1 kg) 33.78 kg/m2 Hysterectomy Never Smoker BMI and BSA Data Body Mass Index Body Surface Area 33.78 kg/m 2 2.06 m 2 Preferred Pharmacy Pharmacy Name [facility] [address] [facility], [address] [facility] [phone] Your Updated Medication List  
  
   
This list is accurate as of 4/11/18  1:38 PM.  Always use your most recent med list.  
  
  
  
  
 allopurinol 300 mg tablet Commonly known as:  ZYLOPRIM  
take 1 tablet by mouth once daily  
  
 amLODIPine 5 mg tablet Commonly known as:  Unknown [place] Take 1 Tab by mouth daily. aspirin delayed-release 81 mg tablet Take  by mouth daily. atorvastatin 80 mg tablet Commonly known as:  LIPITOR  
take 1 tablet by mouth once daily  
  
 carvedilol 3.125 mg tablet Commonly known as:  Manual Adalberto Take 3.125 mg by mouth two (2) times daily (with meals). cholecalciferol 5,000 unit capsule Commonly known as:  VITAMIN D3 Take 5,000 Units by mouth daily. cyanocobalamin ER 1,000 mcg tablet Commonly known as:  VITAMIN B-12 Take 1 tablet by mouth daily. fenofibrate nanocrystallized 145 mg tablet Commonly known as:  [employer] Take 1 Tab by mouth daily. letrozole 2.5 mg tablet Commonly known as:  [facility] Take 2.5 mg by mouth daily. metFORMIN 1,000 mg tablet Commonly known as:  GLUCOPHAGE Take 1 Tab by mouth two (2) times daily (with meals). potassium chloride 20 mEq tablet Commonly known as:  K-DUR, KLOR-CON  
take 1 tablet by mouth once daily PriLOSEC OTC 20 mg tablet Generic drug:  omeprazole Take 20 mg by mouth daily. SITagliptin 50 mg tablet Commonly known as:  Donna Darner Take 1 Tab by mouth daily. Indications: type 2 diabetes mellitus  
  
 valsartan-hydroCHLOROthiazide 160-25 mg per tablet Commonly known as:  DIOVAN-HCT  
take 1 tablet by mouth once daily Introducing [facility]! Weston Saul introduces [email or web address] patient portal. Now you can access parts of your medical record, email your doctor's office, and request medication refills online. 1. In your internet browser, go to [email or web address] 2. Click on the First Time User? Click Here link in the Sign In box. You will see the New Member Sign Up page. 3. Enter your [email or web address] Access Code exactly as it appears below. You will not need to use this code after youve completed the sign-up process. If you do not sign up before the expiration date, you must request a new code. · [email or web address] Access Code: [email or web address] Expires: 7/10/2018  1:27 PM 
 
4. Enter the last four digits of your Social Security Number (xxxx) and Date of Birth (mm/dd/yyyy) as indicated and click Submit. You will be taken to the next sign-up page. 5. Create a [email or web address] ID. This will be your [email or web address] login ID and cannot be changed, so think of one that is secure and easy to remember. 6. Create a [email or web address] password. You can change your password at any time. 7. Enter your Password Reset Question and Answer. This can be used at a later time if you forget your password. 8. Enter your e-mail address. You will receive e-mail notification when new information is available in [address] 9. Click Sign Up. You can now view and download portions of your medical record. 10. Click the Download Summary menu link to download a portable copy of your medical information.  
 
If you have questions, please visit the Frequently Asked Questions section of the [employer]. Remember, [employer] is NOT to be used for urgent needs. For medical emergencies, dial 911. Now available from your iPhone and Android! Please provide this summary of care documentation to your next provider. Your primary care clinician is listed as Hung Smith. If you have any questions after today's visit, please call [phone].

## 2018-04-25 ENCOUNTER — HOSPITAL ENCOUNTER (OUTPATIENT)
Dept: LAB | Age: 68
Discharge: HOME | End: 2018-04-25
Payer: COMMERCIAL

## 2018-04-25 DIAGNOSIS — E11.21 TYPE 2 DIABETES MELLITUS WITH NEPHROPATHY (HCC): ICD-10-CM

## 2018-04-25 DIAGNOSIS — E04.1 LEFT THYROID NODULE: ICD-10-CM

## 2018-04-25 DIAGNOSIS — M1A.9XX0 CHRONIC GOUT WITHOUT TOPHUS, UNSPECIFIED CAUSE, UNSPECIFIED SITE: ICD-10-CM

## 2018-04-25 DIAGNOSIS — I10 ESSENTIAL HYPERTENSION: ICD-10-CM

## 2018-04-25 DIAGNOSIS — E78.5 HYPERLIPIDEMIA, UNSPECIFIED HYPERLIPIDEMIA TYPE: ICD-10-CM

## 2018-04-25 LAB
ALBUMIN SERPL-MCNC: 3.9 G/DL (ref 3.4–5)
ALBUMIN/GLOB SERPL: 1.3 {RATIO} (ref 0.8–1.7)
ALP SERPL-CCNC: 38 U/L (ref 45–117)
ALT SERPL-CCNC: 21 U/L (ref 13–56)
ANION GAP SERPL CALC-SCNC: 7 MMOL/L (ref 3–18)
AST SERPL-CCNC: 14 U/L (ref 15–37)
BASOPHILS # BLD: 0.1 K/UL (ref 0–0.06)
BASOPHILS NFR BLD: 1 % (ref 0–2)
BILIRUB SERPL-MCNC: 0.3 MG/DL (ref 0.2–1)
BUN SERPL-MCNC: 18 MG/DL (ref 7–18)
BUN/CREAT SERPL: 19 (ref 12–20)
CALCIUM SERPL-MCNC: 9.3 MG/DL (ref 8.5–10.1)
CHLORIDE SERPL-SCNC: 101 MMOL/L (ref 100–108)
CHOLEST SERPL-MCNC: 152 MG/DL
CO2 SERPL-SCNC: 29 MMOL/L (ref 21–32)
CREAT SERPL-MCNC: 0.94 MG/DL (ref 0.6–1.3)
DIFFERENTIAL METHOD BLD: ABNORMAL
EOSINOPHIL # BLD: 0.2 K/UL (ref 0–0.4)
EOSINOPHIL NFR BLD: 4 % (ref 0–5)
ERYTHROCYTE [DISTWIDTH] IN BLOOD BY AUTOMATED COUNT: 14.9 % (ref 11.6–14.5)
GLOBULIN SER CALC-MCNC: 3 G/DL (ref 2–4)
GLUCOSE SERPL-MCNC: 153 MG/DL (ref 74–99)
HBA1C MFR BLD: 7.5 % (ref 4.2–5.6)
HCT VFR BLD AUTO: 33.9 % (ref 35–45)
HDLC SERPL-MCNC: 46 MG/DL (ref 40–60)
HDLC SERPL: 3.3 {RATIO} (ref 0–5)
HGB BLD-MCNC: 10.4 G/DL (ref 12–16)
LDLC SERPL CALC-MCNC: 81 MG/DL (ref 0–100)
LIPID PROFILE,FLP: NORMAL
LYMPHOCYTES # BLD: 1.1 K/UL (ref 0.9–3.6)
LYMPHOCYTES NFR BLD: 26 % (ref 21–52)
MCH RBC QN AUTO: 26.7 PG (ref 24–34)
MCHC RBC AUTO-ENTMCNC: 30.7 G/DL (ref 31–37)
MCV RBC AUTO: 87.1 FL (ref 74–97)
MONOCYTES # BLD: 0.3 K/UL (ref 0.05–1.2)
MONOCYTES NFR BLD: 8 % (ref 3–10)
NEUTS SEG # BLD: 2.6 K/UL (ref 1.8–8)
NEUTS SEG NFR BLD: 61 % (ref 40–73)
PLATELET # BLD AUTO: 235 K/UL (ref 135–420)
PMV BLD AUTO: 10.9 FL (ref 9.2–11.8)
POTASSIUM SERPL-SCNC: 4.5 MMOL/L (ref 3.5–5.5)
PROT SERPL-MCNC: 6.9 G/DL (ref 6.4–8.2)
RBC # BLD AUTO: 3.89 M/UL (ref 4.2–5.3)
SODIUM SERPL-SCNC: 137 MMOL/L (ref 136–145)
TRIGL SERPL-MCNC: 125 MG/DL (ref ?–150)
TSH SERPL DL<=0.05 MIU/L-ACNC: 3.34 UIU/ML (ref 0.36–3.74)
URATE SERPL-MCNC: 4.9 MG/DL (ref 2.6–7.2)
VLDLC SERPL CALC-MCNC: 25 MG/DL
WBC # BLD AUTO: 4.2 K/UL (ref 4.6–13.2)

## 2018-04-25 PROCEDURE — 84550 ASSAY OF BLOOD/URIC ACID: CPT | Performed by: PHYSICIAN ASSISTANT

## 2018-04-25 PROCEDURE — 84443 ASSAY THYROID STIM HORMONE: CPT | Performed by: PHYSICIAN ASSISTANT

## 2018-04-25 PROCEDURE — 85025 COMPLETE CBC W/AUTO DIFF WBC: CPT | Performed by: PHYSICIAN ASSISTANT

## 2018-04-25 PROCEDURE — 36415 COLL VENOUS BLD VENIPUNCTURE: CPT | Performed by: PHYSICIAN ASSISTANT

## 2018-04-25 PROCEDURE — 80061 LIPID PANEL: CPT | Performed by: PHYSICIAN ASSISTANT

## 2018-04-25 PROCEDURE — 83036 HEMOGLOBIN GLYCOSYLATED A1C: CPT | Performed by: PHYSICIAN ASSISTANT

## 2018-04-25 PROCEDURE — 80053 COMPREHEN METABOLIC PANEL: CPT | Performed by: PHYSICIAN ASSISTANT

## 2018-05-02 ENCOUNTER — HOSPITAL ENCOUNTER (OUTPATIENT)
Dept: LAB | Age: 68
Discharge: HOME | End: 2018-05-02
Payer: COMMERCIAL

## 2018-05-02 ENCOUNTER — OFFICE VISIT (OUTPATIENT)
Dept: FAMILY MEDICINE CLINIC | Age: 68
End: 2018-05-02

## 2018-05-02 VITALS
HEIGHT: 65 IN | DIASTOLIC BLOOD PRESSURE: 70 MMHG | BODY MASS INDEX: 34.12 KG/M2 | TEMPERATURE: 98.8 F | HEART RATE: 78 BPM | SYSTOLIC BLOOD PRESSURE: 127 MMHG | WEIGHT: 204.8 LBS | OXYGEN SATURATION: 97 % | RESPIRATION RATE: 16 BRPM

## 2018-05-02 DIAGNOSIS — I51.81 TAKOTSUBO CARDIOMYOPATHY: ICD-10-CM

## 2018-05-02 DIAGNOSIS — E11.21 TYPE 2 DIABETES MELLITUS WITH NEPHROPATHY (HCC): ICD-10-CM

## 2018-05-02 DIAGNOSIS — E78.1 HYPERTRIGLYCERIDEMIA: ICD-10-CM

## 2018-05-02 DIAGNOSIS — R31.9 HEMATURIA, UNSPECIFIED TYPE: ICD-10-CM

## 2018-05-02 DIAGNOSIS — M1A.9XX0 CHRONIC GOUT WITHOUT TOPHUS, UNSPECIFIED CAUSE, UNSPECIFIED SITE: ICD-10-CM

## 2018-05-02 DIAGNOSIS — E55.9 VITAMIN D DEFICIENCY: ICD-10-CM

## 2018-05-02 DIAGNOSIS — E78.5 HYPERLIPIDEMIA, UNSPECIFIED HYPERLIPIDEMIA TYPE: ICD-10-CM

## 2018-05-02 DIAGNOSIS — R30.0 DYSURIA: Primary | ICD-10-CM

## 2018-05-02 DIAGNOSIS — E87.6 HYPOKALEMIA: ICD-10-CM

## 2018-05-02 DIAGNOSIS — I10 ESSENTIAL HYPERTENSION: ICD-10-CM

## 2018-05-02 LAB
BILIRUB UR QL STRIP: NEGATIVE
GLUCOSE UR-MCNC: NEGATIVE MG/DL
KETONES P FAST UR STRIP-MCNC: NEGATIVE MG/DL
PH UR STRIP: 6 [PH] (ref 4.6–8)
PROT UR QL STRIP: NEGATIVE
SP GR UR STRIP: 1.01 (ref 1–1.03)
UA UROBILINOGEN AMB POC: NORMAL (ref 0.2–1)
URINALYSIS CLARITY POC: CLEAR
URINALYSIS COLOR POC: YELLOW
URINE BLOOD POC: NORMAL
URINE LEUKOCYTES POC: NORMAL
URINE NITRITES POC: NEGATIVE

## 2018-05-02 PROCEDURE — 87077 CULTURE AEROBIC IDENTIFY: CPT | Performed by: PHYSICIAN ASSISTANT

## 2018-05-02 PROCEDURE — 87186 SC STD MICRODIL/AGAR DIL: CPT | Performed by: PHYSICIAN ASSISTANT

## 2018-05-02 PROCEDURE — 87086 URINE CULTURE/COLONY COUNT: CPT | Performed by: PHYSICIAN ASSISTANT

## 2018-05-02 RX ORDER — NITROFURANTOIN (MACROCRYSTALS) 100 MG/1
100 CAPSULE ORAL 2 TIMES DAILY
Qty: 10 CAP | Refills: 0 | Status: SHIPPED | OUTPATIENT
Start: 2018-05-02 | End: 2018-05-07

## 2018-05-02 RX ORDER — CARVEDILOL 6.25 MG/1
6.25 TABLET ORAL 2 TIMES DAILY
Refills: 0 | COMMUNITY
Start: 2018-04-19 | End: 2018-11-19 | Stop reason: SDUPTHER

## 2018-05-02 RX ORDER — POTASSIUM CHLORIDE 20 MEQ/1
TABLET, EXTENDED RELEASE ORAL
Qty: 90 TAB | Refills: 1 | Status: SHIPPED | OUTPATIENT
Start: 2018-05-02

## 2018-05-02 NOTE — PATIENT INSTRUCTIONS
Hemoglobin A1c: About This Test  What is it? Hemoglobin A1c is a blood test that checks your average blood sugar level over the past 2 to 3 months. This test also is called a glycohemoglobin test or an A1c test.  Why is this test done? The A1c test is done to check how well your diabetes has been controlled over the past 2 to 3 months. Your doctor can use this information to adjust your medicine and diabetes treatment, if needed. How can you prepare for the test?  You do not need to stop eating before you have an A1c test. This test can be done at any time during the day, even after a meal.  What happens during the test?  The health professional taking a sample of your blood will:  · Wrap an elastic band around your upper arm. This makes the veins below the band larger so it is easier to put a needle into the vein. · Clean the needle site with alcohol. · Put the needle into the vein. · Attach a tube to the needle to fill it with blood. · Remove the band from your arm when enough blood is collected. · Put a gauze pad or cotton ball over the needle site as the needle is removed. · Put pressure on the site and then put on a bandage. What else should you know about the test?  The test result is usually given as a percentage. The normal A1c is less than 5.7%. The A1c test result also can be used to find your estimated average glucose, or eAG. Your eAG and A1c show the same thing in two different ways. They both help you learn more about your average blood sugar range over the past 2 to 3 months. Where can you learn more? Go to http://sravan-arcadio.info/. Enter U216 in the search box to learn more about \"Hemoglobin A1c: About This Test.\"  Current as of: March 13, 2017  Content Version: 11.4  © 6638-9851 [employer]. Care instructions adapted under license by HealthLoop (which disclaims liability or warranty for this information).  If you have questions about a medical condition or this instruction, always ask your healthcare professional. [address] any warranty or liability for your use of this information. [email or web address]     Learning About Diabetes Food Guidelines  Your Care Instructions    Meal planning is important to manage diabetes. It helps keep your blood sugar at a target level (which you set with your doctor). You don't have to eat special foods. You can eat what your family eats, including sweets once in a while. But you do have to pay attention to how often you eat and how much you eat of certain foods. You may want to work with a dietitian or a certified diabetes educator (CDE) to help you plan meals and snacks. A dietitian or CDE can also help you lose weight if that is one of your goals. What should you know about eating carbs? Managing the amount of carbohydrate (carbs) you eat is an important part of healthy meals when you have diabetes. Carbohydrate is found in many foods. · Learn which foods have carbs. And learn the amounts of carbs in different foods. ¨ Bread, cereal, pasta, and rice have about 15 grams of carbs in a serving. A serving is 1 slice of bread (1 ounce), ½ cup of cooked cereal, or 1/3 cup of cooked pasta or rice. ¨ Fruits have 15 grams of carbs in a serving. A serving is 1 small fresh fruit, such as an apple or orange; ½ of a banana; ½ cup of cooked or canned fruit; ½ cup of fruit juice; 1 cup of melon or raspberries; or 2 tablespoons of dried fruit. ¨ Milk and no-sugar-added yogurt have 15 grams of carbs in a serving. A serving is 1 cup of milk or 2/3 cup of no-sugar-added yogurt. ¨ Starchy vegetables have 15 grams of carbs in a serving. A serving is ½ cup of mashed potatoes or sweet potato; 1 cup winter squash; ½ of a small baked potato; ½ cup of cooked beans; or ½ cup cooked corn or green peas.   · Learn how much carbs to eat each day and at each meal. A dietitian or CDE can teach you how to keep track of the amount of carbs you eat. This is called carbohydrate counting. · If you are not sure how to count carbohydrate grams, use the Plate Method to plan meals. It is a good, quick way to make sure that you have a balanced meal. It also helps you spread carbs throughout the day. ¨ Divide your plate by types of foods. Put non-starchy vegetables on half the plate, meat or other protein food on one-quarter of the plate, and a grain or starchy vegetable in the final quarter of the plate. To this you can add a small piece of fruit and 1 cup of milk or yogurt, depending on how many carbs you are supposed to eat at a meal.  · Try to eat about the same amount of carbs at each meal. Do not \"save up\" your daily allowance of carbs to eat at one meal.  · Proteins have very little or no carbs per serving. Examples of proteins are beef, chicken, turkey, fish, eggs, tofu, cheese, cottage cheese, and peanut butter. A serving size of meat is 3 ounces, which is about the size of a deck of cards. Examples of meat substitute serving sizes (equal to 1 ounce of meat) are 1/4 cup of cottage cheese, 1 egg, 1 tablespoon of peanut butter, and ½ cup of tofu. How can you eat out and still eat healthy? · Learn to estimate the serving sizes of foods that have carbohydrate. If you measure food at home, it will be easier to estimate the amount in a serving of restaurant food. · If the meal you order has too much carbohydrate (such as potatoes, corn, or baked beans), ask to have a low-carbohydrate food instead. Ask for a salad or green vegetables. · If you use insulin, check your blood sugar before and after eating out to help you plan how much to eat in the future. · If you eat more carbohydrate at a meal than you had planned, take a walk or do other exercise. This will help lower your blood sugar. What else should you know? · Limit saturated fat, such as the fat from meat and dairy products.  This is a healthy choice because people who have diabetes are at higher risk of heart disease. So choose lean cuts of meat and nonfat or low-fat dairy products. Use olive or canola oil instead of butter or shortening when cooking. · Don't skip meals. Your blood sugar may drop too low if you skip meals and take insulin or certain medicines for diabetes. · Check with your doctor before you drink alcohol. Alcohol can cause your blood sugar to drop too low. Alcohol can also cause a bad reaction if you take certain diabetes medicines. Follow-up care is a key part of your treatment and safety. Be sure to make and go to all appointments, and call your doctor if you are having problems. It's also a good idea to know your test results and keep a list of the medicines you take. Where can you learn more? Go to http://sravan-arcadio.info/. Enter Z462 in the search box to learn more about \"Learning About Diabetes Food Guidelines. \"  Current as of: March 13, 2017  Content Version: 11.4  © 5561-6142 Healthwise, Incorporated. Care instructions adapted under license by [employer]book (which disclaims liability or warranty for this information). If you have questions about a medical condition or this instruction, always ask your healthcare professional. [address] any warranty or liability for your use of this information.

## 2018-05-02 NOTE — MR AVS SNAPSHOT
[facility] 
 
 
 [address] [address] [address] [address] 
[phone] Patient: Lolly Calles MRN: [id number] TOS:2/6/7843 Visit Information Date & Time Provider Department Dept. Phone Encounter #  
 5/2/2018  2:00 PM [facility] [phone] [id number] Follow-up Instructions Return in about 6 weeks (around 6/13/2018), or if symptoms worsen or fail to improve, for A1c. Your Appointments 9/19/2018  1:45 PM  
Follow Up with Margie James MD  
[address] [place] [address]) Appt Note: 6 month fu after mammo [address] [facility] [address] [place] [address]  
  
   
 [address] [address]  
  
    
 10/1/2018  3:40 PM  
Follow Up with Ryne Bliss MD  
[facility] ([address]) Appt Note: 6 month f/up [place] [address] [address] [id number] [phone] [address] Upcoming Health Maintenance Date Due Influenza Age 5 to Adult 8/1/2018 FOOT EXAM Q1 9/13/2018 MICROALBUMIN Q1 9/13/2018 HEMOGLOBIN A1C Q6M 10/25/2018 MEDICARE YEARLY EXAM 1/25/2019 EYE EXAM RETINAL OR DILATED Q1 3/27/2019 LIPID PANEL Q1 4/25/2019 COLONOSCOPY 7/23/2019 BREAST CANCER SCRN MAMMOGRAM 10/11/2019 GLAUCOMA SCREENING Q2Y 3/27/2020 DTaP/Tdap/Td series (2 - Td) 9/13/2027 Allergies as of 5/2/2018  Review Complete On: 5/2/2018 By: Saran Rubin PA-C Severity Noted Reaction Type Reactions Sulfa (Sulfonamide Antibiotics)  06/22/2010    Other (comments)  
 nightmares Current Immunizations  Reviewed on 10/13/2014 Name Date Influenza High Dose Vaccine PF 9/13/2017 Influenza Vaccine 10/13/2014, 12/16/2013, 12/24/2012  Influenza Vaccine (Quad) PF 10/11/2016, 12/1/2015  2:30 PM  
 Influenza Vaccine Split 11/30/2011 Pneumococcal Conjugate (PCV-13) 4/19/2017 Pneumococcal Polysaccharide (PPSV-23) 6/11/2014 TD Vaccine 10/8/2008 Tdap 9/13/2017 Not reviewed this visit You Were Diagnosed With   
  
 Codes Comments Dysuria    -  Primary ICD-10-CM: R30.0 ICD-9-CM: 788.1 Hypokalemia     ICD-10-CM: E87.6 ICD-9-CM: 276.8 Type 2 diabetes mellitus with nephropathy (HCC)     ICD-10-CM: E11.21 
ICD-9-CM: 250.40, 583.81 Hyperlipidemia, unspecified hyperlipidemia type     ICD-10-CM: E78.5 ICD-9-CM: 272.4 Chronic gout without tophus, unspecified cause, unspecified site     ICD-10-CM: M1A. 9XX0 
ICD-9-CM: 274.02 Vitamin D deficiency     ICD-10-CM: E55.9 ICD-9-CM: 268.9 Essential hypertension     ICD-10-CM: I10 
ICD-9-CM: 401.9 Takotsubo cardiomyopathy     ICD-10-CM: I51.81 
ICD-9-CM: 429.83 Hypertriglyceridemia     ICD-10-CM: E78.1 ICD-9-CM: 272.1 Hematuria, unspecified type     ICD-10-CM: R31.9 ICD-9-CM: 599.70 Vitals BP Pulse Temp Resp Height(growth percentile) Weight(growth percentile) 127/70 (BP 1 Location: Left arm) 78 98.8 °F (37.1 °C) (Oral) 16 5' 5\" (1.651 m) 204 lb 12.8 oz (92.9 kg) SpO2 BMI OB Status Smoking Status 97% 34.08 kg/m2 Hysterectomy Never Smoker BMI and BSA Data Body Mass Index Body Surface Area 34.08 kg/m 2 2.06 m 2 Preferred Pharmacy Pharmacy Name [facility], [address] [place] [address] [phone] Your Updated Medication List  
  
   
This list is accurate as of 5/2/18  3:11 PM.  Always use your most recent med list.  
  
  
  
  
 allopurinol 300 mg tablet Commonly known as:  ZYLOPRIM  
take 1 tablet by mouth once daily  
  
 amLODIPine 5 mg tablet Commonly known as:  Orval Evelia Take 1 Tab by mouth daily. aspirin delayed-release 81 mg tablet Take  by mouth daily. atorvastatin 80 mg tablet Commonly known as:  LIPITOR  
take 1 tablet by mouth once daily  
  
 carvedilol 6.25 mg tablet Commonly known as:  Milton Lopez Take 6.25 mg by mouth two (2) times a day. cholecalciferol 5,000 unit capsule Commonly known as:  VITAMIN D3 Take 5,000 Units by mouth daily. cyanocobalamin ER 1,000 mcg tablet Commonly known as:  VITAMIN B-12 Take 1 tablet by mouth daily. fenofibrate nanocrystallized 145 mg tablet Commonly known as:  [employer] Take 1 Tab by mouth daily. letrozole 2.5 mg tablet Commonly known as:  [facility] Take 2.5 mg by mouth daily. metFORMIN 1,000 mg tablet Commonly known as:  GLUCOPHAGE Take 1 Tab by mouth two (2) times daily (with meals). nitrofurantoin 100 mg capsule Commonly known as:  MACRODANTIN Take 1 Cap by mouth two (2) times a day for 5 days. potassium chloride 20 mEq tablet Commonly known as:  K-DUR, KLOR-CON  
take 1 tablet by mouth once daily PriLOSEC OTC 20 mg tablet Generic drug:  omeprazole Take 20 mg by mouth daily. SITagliptin 50 mg tablet Commonly known as:  Gema Dy Take 1 Tab by mouth daily. Indications: type 2 diabetes mellitus  
  
 valsartan-hydroCHLOROthiazide 160-25 mg per tablet Commonly known as:  DIOVAN-HCT  
take 1 tablet by mouth once daily Prescriptions Sent to Pharmacy Refills  
 potassium chloride (K-DUR, KLOR-CON) 20 mEq tablet 1 Sig: take 1 tablet by mouth once daily Class: Normal  
 Pharmacy: [facility] [place], [place] [address]rui Mata Ph #: [phone]  
 nitrofurantoin (MACRODANTIN) 100 mg capsule 0 Sig: Take 1 Cap by mouth two (2) times a day for 5 days. Class: Normal  
 Pharmacy: [facility] [address] [place] [address], [facility] Ph #: [phone] Route: Oral  
  
We Performed the Following AMB POC URINALYSIS DIP STICK AUTO W/O MICRO [95526 CPT(R)] Follow-up Instructions Return in about 6 weeks (around 6/13/2018), or if symptoms worsen or fail to improve, for A1c. To-Do List   
 05/02/2018 Microbiology:  CULTURE, URINE   
  
 05/25/2018 10:30 AM  
  Appointment with [facility] at [facility] ([phone]) ATTENTION: The Appointment Time in [address] may not reflect your scheduled appointment time as the time is only a place [place]. If you have any questions about your appointment time, please call [facility] / [address] [place] [address] at [phone].  
  
 09/12/2018 10:00 AM  
  Appointment with [facility] 2 at [address] [place] [address] ([phone]) OUTSIDE FILMS  - Any outside films related to the study being scheduled should be brought with you on the day of the exam.  If this cannot be done there may be a delay in the reading of the study. MEDICATIONS  - Patient must bring a complete list of all medications currently taking to include prescriptions, over-the-counter meds, herbals, vitamins & any dietary supplements  GENERAL INSTRUCTIONS  - On the day of your exam do not use any bath powder, deodorant or lotions on the armpit area. Patient Instructions Hemoglobin A1c: About This Test 
What is it? Hemoglobin A1c is a blood test that checks your average blood sugar level over the past 2 to 3 months. This test also is called a glycohemoglobin test or an A1c test. 
Why is this test done? The A1c test is done to check how well your diabetes has been controlled over the past 2 to 3 months. Your doctor can use this information to adjust your medicine and diabetes treatment, if needed. How can you prepare for the test? 
You do not need to stop eating before you have an A1c test. This test can be done at any time during the day, even after a meal. 
What happens during the test? 
The health professional taking a sample of your blood will: · Wrap an elastic band around your upper arm. This makes the veins below the band larger so it is easier to put a needle into the vein. · Clean the needle site with alcohol. · Put the needle into the vein. · Attach a tube to the needle to fill it with blood. · Remove the band from your arm when enough blood is collected. · Put a gauze pad or cotton ball over the needle site as the needle is removed. · Put pressure on the site and then put on a bandage. What else should you know about the test? 
The test result is usually given as a percentage. The normal A1c is less than 5.7%. The A1c test result also can be used to find your estimated average glucose, or eAG. Your eAG and A1c show the same thing in two different ways. They both help you learn more about your average blood sugar range over the past 2 to 3 months. Where can you learn more? Go to http://sravan-arcadio.info/. Enter U216 in the search box to learn more about \"Hemoglobin A1c: About This Test.\" Current as of: March 13, 2017 Content Version: 11.4 © 2832-5991 [employer]. Care instructions adapted under license by [employer] (which disclaims liability or warranty for this information). If you have questions about a medical condition or this instruction, always ask your healthcare professional. [address] any warranty or liability for your use of this information. [email or web address] Learning About Diabetes Food Guidelines Your Care Instructions Meal planning is important to manage diabetes. It helps keep your blood sugar at a target level (which you set with your doctor). You don't have to eat special foods. You can eat what your family eats, including sweets once in a while. But you do have to pay attention to how often you eat and how much you eat of certain foods.  
You may want to work with a dietitian or a certified diabetes educator (CDE) to help you plan meals and snacks. A dietitian or CDE can also help you lose weight if that is one of your goals. What should you know about eating carbs? Managing the amount of carbohydrate (carbs) you eat is an important part of healthy meals when you have diabetes. Carbohydrate is found in many foods. · Learn which foods have carbs. And learn the amounts of carbs in different foods. ¨ Bread, cereal, pasta, and rice have about 15 grams of carbs in a serving. A serving is 1 slice of bread (1 ounce), ½ cup of cooked cereal, or 1/3 cup of cooked pasta or rice. ¨ Fruits have 15 grams of carbs in a serving. A serving is 1 small fresh fruit, such as an apple or orange; ½ of a banana; ½ cup of cooked or canned fruit; ½ cup of fruit juice; 1 cup of melon or raspberries; or 2 tablespoons of dried fruit. ¨ Milk and no-sugar-added yogurt have 15 grams of carbs in a serving. A serving is 1 cup of milk or 2/3 cup of no-sugar-added yogurt. ¨ Starchy vegetables have 15 grams of carbs in a serving. A serving is ½ cup of mashed potatoes or sweet potato; 1 cup winter squash; ½ of a small baked potato; ½ cup of cooked beans; or ½ cup cooked corn or green peas. · Learn how much carbs to eat each day and at each meal. A dietitian or CDE can teach you how to keep track of the amount of carbs you eat. This is called carbohydrate counting. · If you are not sure how to count carbohydrate grams, use the Plate Method to plan meals. It is a good, quick way to make sure that you have a balanced meal. It also helps you spread carbs throughout the day. ¨ Divide your plate by types of foods. Put non-starchy vegetables on half the plate, meat or other protein food on one-quarter of the plate, and a grain or starchy vegetable in the final quarter of the plate.  To this you can add a small piece of fruit and 1 cup of milk or yogurt, depending on how many carbs you are supposed to eat at a meal. 
 · Try to eat about the same amount of carbs at each meal. Do not \"save up\" your daily allowance of carbs to eat at one meal. 
· Proteins have very little or no carbs per serving. Examples of proteins are beef, chicken, turkey, fish, eggs, tofu, cheese, cottage cheese, and peanut butter. A serving size of meat is 3 ounces, which is about the size of a deck of cards. Examples of meat substitute serving sizes (equal to 1 ounce of meat) are 1/4 cup of cottage cheese, 1 egg, 1 tablespoon of peanut butter, and ½ cup of tofu. How can you eat out and still eat healthy? · Learn to estimate the serving sizes of foods that have carbohydrate. If you measure food at home, it will be easier to estimate the amount in a serving of restaurant food. · If the meal you order has too much carbohydrate (such as potatoes, corn, or baked beans), ask to have a low-carbohydrate food instead. Ask for a salad or green vegetables. · If you use insulin, check your blood sugar before and after eating out to help you plan how much to eat in the future. · If you eat more carbohydrate at a meal than you had planned, take a walk or do other exercise. This will help lower your blood sugar. What else should you know? · Limit saturated fat, such as the fat from meat and dairy products. This is a healthy choice because people who have diabetes are at higher risk of heart disease. So choose lean cuts of meat and nonfat or low-fat dairy products. Use olive or canola oil instead of butter or shortening when cooking. · Don't skip meals. Your blood sugar may drop too low if you skip meals and take insulin or certain medicines for diabetes. · Check with your doctor before you drink alcohol. Alcohol can cause your blood sugar to drop too low. Alcohol can also cause a bad reaction if you take certain diabetes medicines. Follow-up care is a key part of your treatment and safety.  Be sure to make and go to all appointments, and call your doctor if you are having problems. It's also a good idea to know your test results and keep a list of the medicines you take. Where can you learn more? Go to http://sravan-arcadio.info/. Enter W647 in the search box to learn more about \"Learning About Diabetes Food Guidelines. \" Current as of: March 13, 2017 Content Version: 11.4 © 2721-6351 [employer]. Care instructions adapted under license by [employer] (which disclaims liability or warranty for this information). If you have questions about a medical condition or this instruction, always ask your healthcare professional. [address] any warranty or liability for your use of this information. Introducing [facility]! [employer] introduces [employer] patient portal. Now you can access parts of your medical record, email your doctor's office, and request medication refills online. 1. In your internet browser, go to [email or web address] 2. Click on the First Time User? Click Here link in the Sign In box. You will see the New Member Sign Up page. 3. Enter your [employer] Access Code exactly as it appears below. You will not need to use this code after youve completed the sign-up process. If you do not sign up before the expiration date, you must request a new code. · [employer] Access Code: [email or web address] Expires: 7/10/2018  1:27 PM 
 
4. Enter the last four digits of your Social Security Number (xxxx) and Date of Birth (mm/dd/yyyy) as indicated and click Submit. You will be taken to the next sign-up page. 5. Create a Events Coret ID. This will be your [employer] login ID and cannot be changed, so think of one that is secure and easy to remember. 6. Create a [employer] password. You can change your password at any time. 7. Enter your Password Reset Question and Answer.  This can be used at a later time if you forget your password. 8. Enter your e-mail address. You will receive e-mail notification when new information is available in [address] 9. Click Sign Up. You can now view and download portions of your medical record. 10. Click the Download Summary menu link to download a portable copy of your medical information. If you have questions, please visit the Frequently Asked Questions section of the [employer] website. Remember, [employer] is NOT to be used for urgent needs. For medical emergencies, dial 911. Now available from your iPhone and Android! Please provide this summary of care documentation to your next provider. Your primary care clinician is listed as Blanca Chong. If you have any questions after today's visit, please call [phone].

## 2018-05-02 NOTE — PROGRESS NOTES
Pt is here for 4 month F/U HTN, diabetes, Cholesterol. 1. Have you been to the ER, urgent care clinic since your last visit? Hospitalized since your last visit? No    2. Have you seen or consulted any other health care providers outside of the [facility] since your last visit? Include any pap smears or colon screening.  No

## 2018-05-02 NOTE — PROGRESS NOTES
HPI:    Lolly Souza  is a 75 y.o.  female  patient who comes in today for routine care and results of lab work. She presents with complaints of frequent crying but states this has been a problem since her knee surgery. She has had no changes in medical health since last seen in the office. She has had no family health changes. She has finished radiation and is now taking Femara for breast cancer. Patient states Shedoes not exercise regularly. Patient states that She does not follow a particular diet plan. Patient states that She is not compliant with medications. She does not check her blood sugars. She may be moving at the end of the summer to live near her daughter in [place]. Patient denies SOB, CP, dizziness, headache. Current Outpatient Prescriptions   Medication Sig Dispense Refill    carvedilol (COREG) 6.25 mg tablet Take 6.25 mg by mouth two (2) times a day.  0    potassium chloride (K-DUR, KLOR-CON) 20 mEq tablet take 1 tablet by mouth once daily 90 Tab 1    nitrofurantoin (MACRODANTIN) 100 mg capsule Take 1 Cap by mouth two (2) times a day for 5 days. 10 Cap 0    allopurinol (ZYLOPRIM) 300 mg tablet take 1 tablet by mouth once daily 90 Tab 1    letrozole (FEMARA) 2.5 mg tablet Take 2.5 mg by mouth daily. 0    atorvastatin (LIPITOR) 80 mg tablet take 1 tablet by mouth once daily 90 Tab 1    fenofibrate nanocrystallized (TRICOR) 145 mg tablet Take 1 Tab by mouth daily. 90 Tab 1    amLODIPine (NORVASC) 5 mg tablet Take 1 Tab by mouth daily. 90 Tab 3    SITagliptin (JANUVIA) 50 mg tablet Take 1 Tab by mouth daily. Indications: type 2 diabetes mellitus 90 Tab 2    metFORMIN (GLUCOPHAGE) 1,000 mg tablet Take 1 Tab by mouth two (2) times daily (with meals). 180 Tab 3    valsartan-hydroCHLOROthiazide (DIOVAN-HCT) 160-25 mg per tablet take 1 tablet by mouth once daily 90 Tab 1    aspirin delayed-release 81 mg tablet Take  by mouth daily.       cyanocobalamin ER (VITAMIN B-12) 1,000 mcg tablet Take 1 tablet by mouth daily. 90 tablet 3    cholecalciferol (VITAMIN D3) 5,000 unit capsule Take 5,000 Units by mouth daily.  omeprazole (PRILOSEC OTC) 20 mg tablet Take 20 mg by mouth daily. Allergies   Allergen Reactions    Sulfa (Sulfonamide Antibiotics) Other (comments)     nightmares      Past Medical History:   Diagnosis Date    Arthritis     Candida intertrigo 7/11/2011    Diabetes [address]     GERD (gastroesophageal reflux disease)     Gout     [facility]2017    mild (tx'd medically)    Hypercholesterolemia     Hypertension       Family History   Problem Relation Age of Onset    Hypertension Father     Heart Disease Father     Hypertension Brother     Diabetes Brother     Heart Disease Mother     Breast Cancer Other       Patient Active Problem List   Diagnosis Code    Hyperlipidemia E78.5    GERD (gastroesophageal reflux disease) K21.9    Gout M10.9    Vitamin D deficiency E55.9    HTN (hypertension) I10    Left thyroid nodule E04.1    Absolute anemia D64.9    Primary osteoarthritis of right knee M17.11    Arthritis of knee M17.10    Cataract of both eyes H26.9    Dry eyes, bilateral H04.123    Vitreous floaters of both eyes H43.393    Lung nodules R91.8    Takotsubo cardiomyopathy I51.81    Breast cancer of upper-inner quadrant of left female breast ([address] L99.340    Personal history of breast cancer Z85.3    Type 2 diabetes mellitus with nephropathy ([address] E11.21    Hypertriglyceridemia E78.1            Review of Systems   Constitutional: Negative for chills and fever. Respiratory: Negative for shortness of breath. Cardiovascular: Negative for chest pain, palpitations and leg swelling. Gastrointestinal: Negative for abdominal pain, blood in stool, constipation, diarrhea, heartburn, nausea and vomiting. Musculoskeletal: Negative for falls. Neurological: Negative for dizziness and headaches.    Psychiatric/Behavioral: Negative for depression and memory loss. The patient is nervous/anxious (tearful). The patient does not have insomnia. Visit Vitals    /70 (BP 1 Location: Left arm)    Pulse 78    Temp 98.8 °F (37.1 °C) (Oral)    Resp 16    Ht 5' 5\" (1.651 m)    Wt 204 lb 12.8 oz (92.9 kg)    SpO2 97%    BMI 34.08 kg/m2        Physical Exam   Constitutional: She is oriented to person, place, and time and well-developed, well-nourished, and in no distress. HENT:   Head: Normocephalic and atraumatic. Eyes: Conjunctivae are normal.   Neck: Normal range of motion. Neck supple. No thyromegaly present. Cardiovascular: Normal rate, regular rhythm, normal heart sounds and intact distal pulses. Pulmonary/Chest: Effort normal and breath sounds normal.   Abdominal: Soft. Bowel sounds are normal. She exhibits no distension and no mass. There is no tenderness. Musculoskeletal: She exhibits no edema. Lymphadenopathy:     She has no cervical adenopathy. Neurological: She is alert and oriented to person, place, and time. Psychiatric: Memory, affect and judgment normal. Her mood appears anxious (when talking about moving, finances or her family). She does not exhibit a depressed mood. She expresses no homicidal and no suicidal ideation. Vitals reviewed.     Lab Results   Component Value Date/Time    WBC 4.2 (L) 04/25/2018 07:30 AM    HGB 10.4 (L) 04/25/2018 07:30 AM    HCT 33.9 (L) 04/25/2018 07:30 AM    PLATELET 958 88/52/1849 07:30 AM    MCV 87.1 04/25/2018 07:30 AM     Lab Results   Component Value Date/Time    Sodium 137 04/25/2018 07:30 AM    Potassium 4.5 04/25/2018 07:30 AM    Chloride 101 04/25/2018 07:30 AM    CO2 29 04/25/2018 07:30 AM    Anion gap 7 04/25/2018 07:30 AM    Glucose 153 (H) 04/25/2018 07:30 AM    BUN 18 04/25/2018 07:30 AM    Creatinine 0.94 04/25/2018 07:30 AM    BUN/Creatinine ratio 19 04/25/2018 07:30 AM    GFR est AA >60 04/25/2018 07:30 AM    GFR est non-AA 59 (L) 04/25/2018 07:30 AM Calcium 9.3 04/25/2018 07:30 AM    Bilirubin, total 0.3 04/25/2018 07:30 AM    AST (SGOT) 14 (L) 04/25/2018 07:30 AM    Alk. phosphatase 38 (L) 04/25/2018 07:30 AM    Protein, total 6.9 04/25/2018 07:30 AM    Albumin 3.9 04/25/2018 07:30 AM    Globulin 3.0 04/25/2018 07:30 AM    A-G Ratio 1.3 04/25/2018 07:30 AM    ALT (SGPT) 21 04/25/2018 07:30 AM     Lab Results   Component Value Date/Time    TSH 3.34 04/25/2018 07:30 AM     Lab Results   Component Value Date/Time    Cholesterol, total 152 04/25/2018 07:30 AM    HDL Cholesterol 46 04/25/2018 07:30 AM    LDL, calculated 81 04/25/2018 07:30 AM    VLDL, calculated 25 04/25/2018 07:30 AM    Triglyceride 125 04/25/2018 07:30 AM    CHOL/HDL Ratio 3.3 04/25/2018 07:30 AM     Lab Results   Component Value Date/Time    Vitamin D 25-Hydroxy 35.3 01/17/2018 07:36 AM       Lab Results   Component Value Date/Time    Vitamin B12 737 01/17/2018 07:36 AM     Lab Results   Component Value Date/Time    Hemoglobin A1c 7.5 (H) 04/25/2018 07:30 AM         There are no preventive care reminders to display for this patient. Assessment/Plan:    Diagnoses and all orders for this visit:    1. Dysuria  -     AMB POC URINALYSIS DIP STICK AUTO W/O MICRO    2. Hypokalemia  -     potassium chloride (K-DUR, KLOR-CON) 20 mEq tablet; take 1 tablet by mouth once daily    3. Type 2 diabetes mellitus with nephropathy ([address] - Will consider increasing Januvia. 4. Hyperlipidemia, unspecified hyperlipidemia type - stable, will cont current regimen. 5. Chronic gout without tophus, unspecified cause, unspecified site - well controlled, patient wishes to discontinue allopurinol. She has never had one event. Okay to DC. 6. Vitamin D deficiency  - stable,    7. Essential hypertension - stable, cont current regimen    8. Takotsubo cardiomyopathy - patient to follow up with Dr. Rubio Reece as needed. 9. Hypertriglyceridemia - stable, controlled,      10.   Dysuria -  Hematuria, leukocytes - urine culture orderd  Nitrofurantoin 100 bid x 5 days. Questioning whether there may be some PBA. We can refer to neuropsyche if patient desires. Follow-up Disposition:  Return in about 6 weeks (around 6/13/2018), or if symptoms worsen or fail to improve, for A1c. Additional Notes: Discussed today's diagnosis, treatment plans. Discussed medication indications and side effects. Preventive Medicine:   Weight Management:  Mediterranean diet recommended as well as exercise for 30 minutes daily most days of the week. DASH diet info given. After Visit Summary: Provided and discussed printed patient instructions. Answered all questions and patient acknowledged understanding. Jeferson Nunn PA-C   [facility]    I reviewed the patient's medical history, the physician assistant's findings on physical examination, the patient's diagnoses, and treatment plan as documented in the progress note. I concur with the treatment plan as documented. There are no additional recommendations at this time.     Nicholos Gosselin, MD right

## 2018-05-03 NOTE — PROGRESS NOTES
Please let patient know that urine culture confirms urinary tract infection and she should complete the course of antibiotics unless additional testing reveals resistance. In that case, I will call her and let her know.

## 2018-05-04 ENCOUNTER — TELEPHONE (OUTPATIENT)
Dept: FAMILY MEDICINE CLINIC | Age: 68
End: 2018-05-04

## 2018-05-04 LAB
BACTERIA SPEC CULT: ABNORMAL
SERVICE CMNT-IMP: ABNORMAL

## 2018-05-04 NOTE — TELEPHONE ENCOUNTER
Please let patient know that we will wait and see if her A1c comes down since she is no longer undergoing breast cancer treatment, except for Femara. Also, the antibiotic is prescribed is appropriate to treat urinary tract infection, please finish all medication.

## 2018-05-17 DIAGNOSIS — D64.9 ANEMIA, UNSPECIFIED TYPE: ICD-10-CM

## 2018-05-17 RX ORDER — LANOLIN ALCOHOL/MO/W.PET/CERES
CREAM (GRAM) TOPICAL
Qty: 90 TAB | Refills: 0 | Status: SHIPPED | OUTPATIENT
Start: 2018-05-17 | End: 2018-08-23 | Stop reason: SDUPTHER

## 2018-05-23 ENCOUNTER — DOCUMENTATION ONLY (OUTPATIENT)
Dept: ONCOLOGY | Age: 68
End: 2018-05-23

## 2018-05-25 ENCOUNTER — HOSPITAL ENCOUNTER (OUTPATIENT)
Dept: GENERAL RADIOLOGY | Age: 68
Discharge: HOME | End: 2018-05-25
Payer: COMMERCIAL

## 2018-05-25 ENCOUNTER — HOSPITAL ENCOUNTER (OUTPATIENT)
Dept: RADIATION THERAPY | Age: 68
Discharge: HOME | End: 2018-05-25
Payer: COMMERCIAL

## 2018-05-25 DIAGNOSIS — C50.919 BREAST CANCER (HCC): ICD-10-CM

## 2018-05-25 DIAGNOSIS — C50.412 MALIGNANT NEOPLASM OF UPPER-OUTER QUADRANT OF LEFT BREAST IN FEMALE, ESTROGEN RECEPTOR POSITIVE (HCC): ICD-10-CM

## 2018-05-25 DIAGNOSIS — Z17.0 MALIGNANT NEOPLASM OF UPPER-OUTER QUADRANT OF LEFT BREAST IN FEMALE, ESTROGEN RECEPTOR POSITIVE (HCC): ICD-10-CM

## 2018-05-25 PROCEDURE — 71046 X-RAY EXAM CHEST 2 VIEWS: CPT

## 2018-05-25 PROCEDURE — 99211 OFF/OP EST MAY X REQ PHY/QHP: CPT

## 2018-05-29 ENCOUNTER — DOCUMENTATION ONLY (OUTPATIENT)
Dept: ONCOLOGY | Age: 68
End: 2018-05-29

## 2018-05-29 NOTE — PROGRESS NOTES
Survivorship Care plan reviewed with pt on 5/25 with Slick Sneed, RN @ her F/U in [facility]. Corrected copy mailed 5/29 to pt with letter to call and review over the phone.

## 2018-06-13 ENCOUNTER — OFFICE VISIT (OUTPATIENT)
Dept: FAMILY MEDICINE CLINIC | Age: 68
End: 2018-06-13

## 2018-06-13 ENCOUNTER — DOCUMENTATION ONLY (OUTPATIENT)
Dept: ONCOLOGY | Age: 68
End: 2018-06-13

## 2018-06-13 VITALS
HEIGHT: 65 IN | WEIGHT: 203 LBS | SYSTOLIC BLOOD PRESSURE: 123 MMHG | DIASTOLIC BLOOD PRESSURE: 71 MMHG | BODY MASS INDEX: 33.82 KG/M2 | RESPIRATION RATE: 16 BRPM | TEMPERATURE: 98.4 F | OXYGEN SATURATION: 97 % | HEART RATE: 71 BPM

## 2018-06-13 DIAGNOSIS — F48.2 PBA (PSEUDOBULBAR AFFECT): Primary | ICD-10-CM

## 2018-06-13 DIAGNOSIS — I51.81 TAKOTSUBO CARDIOMYOPATHY: ICD-10-CM

## 2018-06-13 DIAGNOSIS — Z91.09 ENVIRONMENTAL ALLERGIES: ICD-10-CM

## 2018-06-13 DIAGNOSIS — H69.92 EUSTACHIAN TUBE DISORDER, LEFT: ICD-10-CM

## 2018-06-13 RX ORDER — VITAMIN E 268 MG
CAPSULE ORAL 2 TIMES DAILY
COMMUNITY

## 2018-06-13 RX ORDER — FLUTICASONE PROPIONATE 50 MCG
2 SPRAY, SUSPENSION (ML) NASAL DAILY
Qty: 1 BOTTLE | Refills: 5 | Status: SHIPPED | OUTPATIENT
Start: 2018-06-13

## 2018-06-13 NOTE — PATIENT INSTRUCTIONS
Learning About Diabetes Food Guidelines  Your Care Instructions    Meal planning is important to manage diabetes. It helps keep your blood sugar at a target level (which you set with your doctor). You don't have to eat special foods. You can eat what your family eats, including sweets once in a while. But you do have to pay attention to how often you eat and how much you eat of certain foods. You may want to work with a dietitian or a certified diabetes educator (CDE) to help you plan meals and snacks. A dietitian or CDE can also help you lose weight if that is one of your goals. What should you know about eating carbs? Managing the amount of carbohydrate (carbs) you eat is an important part of healthy meals when you have diabetes. Carbohydrate is found in many foods. · Learn which foods have carbs. And learn the amounts of carbs in different foods. ¨ Bread, cereal, pasta, and rice have about 15 grams of carbs in a serving. A serving is 1 slice of bread (1 ounce), ½ cup of cooked cereal, or 1/3 cup of cooked pasta or rice. ¨ Fruits have 15 grams of carbs in a serving. A serving is 1 small fresh fruit, such as an apple or orange; ½ of a banana; ½ cup of cooked or canned fruit; ½ cup of fruit juice; 1 cup of melon or raspberries; or 2 tablespoons of dried fruit. ¨ Milk and no-sugar-added yogurt have 15 grams of carbs in a serving. A serving is 1 cup of milk or 2/3 cup of no-sugar-added yogurt. ¨ Starchy vegetables have 15 grams of carbs in a serving. A serving is ½ cup of mashed potatoes or sweet potato; 1 cup winter squash; ½ of a small baked potato; ½ cup of cooked beans; or ½ cup cooked corn or green peas. · Learn how much carbs to eat each day and at each meal. A dietitian or CDE can teach you how to keep track of the amount of carbs you eat. This is called carbohydrate counting. · If you are not sure how to count carbohydrate grams, use the Plate Method to plan meals.  It is a good, quick way to make sure that you have a balanced meal. It also helps you spread carbs throughout the day. ¨ Divide your plate by types of foods. Put non-starchy vegetables on half the plate, meat or other protein food on one-quarter of the plate, and a grain or starchy vegetable in the final quarter of the plate. To this you can add a small piece of fruit and 1 cup of milk or yogurt, depending on how many carbs you are supposed to eat at a meal.  · Try to eat about the same amount of carbs at each meal. Do not \"save up\" your daily allowance of carbs to eat at one meal.  · Proteins have very little or no carbs per serving. Examples of proteins are beef, chicken, turkey, fish, eggs, tofu, cheese, cottage cheese, and peanut butter. A serving size of meat is 3 ounces, which is about the size of a deck of cards. Examples of meat substitute serving sizes (equal to 1 ounce of meat) are 1/4 cup of cottage cheese, 1 egg, 1 tablespoon of peanut butter, and ½ cup of tofu. How can you eat out and still eat healthy? · Learn to estimate the serving sizes of foods that have carbohydrate. If you measure food at home, it will be easier to estimate the amount in a serving of restaurant food. · If the meal you order has too much carbohydrate (such as potatoes, corn, or baked beans), ask to have a low-carbohydrate food instead. Ask for a salad or green vegetables. · If you use insulin, check your blood sugar before and after eating out to help you plan how much to eat in the future. · If you eat more carbohydrate at a meal than you had planned, take a walk or do other exercise. This will help lower your blood sugar. What else should you know? · Limit saturated fat, such as the fat from meat and dairy products. This is a healthy choice because people who have diabetes are at higher risk of heart disease. So choose lean cuts of meat and nonfat or low-fat dairy products.  Use olive or canola oil instead of butter or shortening when cooking. · Don't skip meals. Your blood sugar may drop too low if you skip meals and take insulin or certain medicines for diabetes. · Check with your doctor before you drink alcohol. Alcohol can cause your blood sugar to drop too low. Alcohol can also cause a bad reaction if you take certain diabetes medicines. Follow-up care is a key part of your treatment and safety. Be sure to make and go to all appointments, and call your doctor if you are having problems. It's also a good idea to know your test results and keep a list of the medicines you take. Where can you learn more? Go to http://sravanClipCardarcadio.info/. Enter O196 in the search box to learn more about \"Learning About Diabetes Food Guidelines. \"  Current as of: March 13, 2017  Content Version: 11.4  © 1757-3261 [employer]. Care instructions adapted under license by [employer] (which disclaims liability or warranty for this information). If you have questions about a medical condition or this instruction, always ask your healthcare professional. [place] [address] any warranty or liability for your use of this information. Learning About Pseudobulbar Affect (PBA)  What is pseudobulbar affect (PBA)? Pseudobulbar affect (say \"twk-yic-NPJ-cintia AFF-ect\") is a problem in the brain that causes you to laugh or cry for no reason. Sudden fits of tears or laughter can come from nowhere. This behavior usually has nothing to do with what you're feeling. You can't control it. Normally, the \"feel\" and \"express\" parts of your brain work together. But with PBA, the expressive part of your brain can trigger behavior on its own. This behavior tends to cause awkward social situations. Living with PBA can be very stressful. PBA can happen along with certain health problems that affect the brain, such as Parkinson's disease or damage from a stroke.  Fortunately, there is medicine that can help improve PBA symptoms. What are the symptoms? When you have PBA, you may:  · Suddenly cry or laugh for no reason. · Have trouble controlling how long or intensely you laugh or cry. · Feel none of the usual relief after crying or laughing. How is PBA treated? PBA is treated with medicine that affects certain brain chemicals. Medicines include:  · Antidepressant medicine. · Dextromethorphan and quinidine (Nuedexta). Another key to living with PBA is support from people who understand it. Talk with people close to you about your condition. Be patient and kind to yourself. And ask for help when you need it. Follow-up care is a key part of your treatment and safety. Be sure to make and go to all appointments, and call your doctor if you are having problems. It's also a good idea to know your test results and keep a list of the medicines you take. Where can you learn more? Go to http://sravan-arcadio.info/. Enter U277 in the search box to learn more about \"Learning About Pseudobulbar Affect (PBA). \"  Current as of: October 14, 2016  Content Version: 11.4  © 7221-5192 [employer]. Care instructions adapted under license by [employer] (which disclaims liability or warranty for this information). If you have questions about a medical condition or this instruction, always ask your healthcare professional. [place] [address] any warranty or liability for your use of this information.

## 2018-06-13 NOTE — PROGRESS NOTES
Pt is here for 6 week follow up on diabetes    1. Have you been to the ER, urgent care clinic since your last visit? Hospitalized since your last visit? No    2. Have you seen or consulted any other health care providers outside of the [address] [place] [address] since your last visit? Include any pap smears or colon screening.  Yes Dr Emmett Loza 6/4/18

## 2018-06-13 NOTE — MR AVS SNAPSHOT
[address] [facility] 
 
 
 [address] [address] [facility] [facility] [address] 
[phone] Patient: Lolly Calles MRN: [id number] Visit Information Date & Time Provider Department Dept. Phone Encounter #  
 6/13/2018  2:00 PM Cindy Musa PA-C [facility] [phone] [id number] Follow-up Instructions Return in about 4 weeks (around 7/11/2018), or if symptoms worsen or fail to improve, for fu PBA. Your Appointments 9/19/2018  1:45 PM  
Follow Up with Jacquelyn Espino MD  
[address] [facility]) Appt Note: 6 month fu after mammo [address] [facility] [address] [place] [address]  
  
   
 [address] [address]  
  
    
 10/1/2018  3:40 PM  
Follow Up with Iva Balderrama MD  
[facility] ([address]) Appt Note: 6 month f/up [place] [phone]  
[phone] [address] Upcoming Health Maintenance Date Due Influenza Age 5 to Adult 8/1/2018 FOOT EXAM Q1 9/13/2018 MICROALBUMIN Q1 9/13/2018 HEMOGLOBIN A1C Q6M 10/25/2018 MEDICARE YEARLY EXAM 1/25/2019 EYE EXAM RETINAL OR DILATED Q1 3/27/2019 LIPID PANEL Q1 4/25/2019 COLONOSCOPY 7/23/2019 BREAST CANCER SCRN MAMMOGRAM 10/11/2019 GLAUCOMA SCREENING Q2Y 3/27/2020 DTaP/Tdap/Td series (2 - Td) 9/13/2027 Allergies as of 6/13/2018  Review Complete On: 6/13/2018 By: Cindy Musa PA-C Severity Noted Reaction Type Reactions Sulfa (Sulfonamide Antibiotics)  06/22/2010    Other (comments)  
 nightmares Current Immunizations  Reviewed on 10/13/2014 Name Date Influenza High Dose Vaccine PF 9/13/2017 Influenza Vaccine 10/13/2014, 12/16/2013, 12/24/2012  Influenza Vaccine (Quad) PF 10/11/2016, 12/1/2015  2:30 PM  
 Influenza Vaccine Split 11/30/2011 Pneumococcal Conjugate (PCV-13) 4/19/2017 Pneumococcal Polysaccharide (PPSV-23) 6/11/2014 TD Vaccine 10/8/2008 Tdap 9/13/2017 Not reviewed this visit You Were Diagnosed With   
  
 Codes Comments PBA (pseudobulbar affect)    -  Primary ICD-10-CM: Z88.0 ICD-9-CM: 310.81 Takotsubo cardiomyopathy     ICD-10-CM: I51.81 
ICD-9-CM: 429.83 Eustachian tube disorder, left     ICD-10-CM: H69.92 
ICD-9-CM: 381.9 Environmental allergies     ICD-10-CM: Z91.09 
ICD-9-CM: V15.09 Vitals BP Pulse Temp Resp Height(growth percentile) Weight(growth percentile) 123/71 (BP 1 Location: Left arm) 71 98.4 °F (36.9 °C) (Oral) 16 5' 5\" (1.651 m) 203 lb (92.1 kg) SpO2 BMI OB Status Smoking Status 97% 33.78 kg/m2 Hysterectomy Never Smoker BMI and BSA Data Body Mass Index Body Surface Area 33.78 kg/m 2 2.06 m 2 Preferred Pharmacy Pharmacy Name [facility], [address] [place] [address] [phone] Your Updated Medication List  
  
   
This list is accurate as of 6/13/18  2:49 PM.  Always use your most recent med list.  
  
  
  
  
 allopurinol 300 mg tablet Commonly known as:  ZYLOPRIM  
take 1 tablet by mouth once daily  
  
 amLODIPine 5 mg tablet Commonly known as:  [place] Laura Take 1 Tab by mouth daily. aspirin delayed-release 81 mg tablet Take  by mouth daily. atorvastatin 80 mg tablet Commonly known as:  LIPITOR  
take 1 tablet by mouth once daily  
  
 carvedilol 6.25 mg tablet Commonly known as:  Raynette Hy Take 6.25 mg by mouth two (2) times a day. cholecalciferol 5,000 unit capsule Commonly known as:  VITAMIN D3 Take 5,000 Units by mouth daily. cyanocobalamin ER 1,000 mcg tablet Commonly known as:  VITAMIN B-12 Take 1 tablet by mouth daily. fenofibrate nanocrystallized 145 mg tablet Commonly known as:  [employer]  
 Take 1 Tab by mouth daily. ferrous sulfate 325 mg (65 mg iron) tablet  
take 1 tablet by mouth once daily BEFORE BREAKFAST  
  
 fluticasone 50 mcg/actuation nasal spray Commonly known as:  Star Serve 2 Sprays by Both Nostrils route daily. letrozole 2.5 mg tablet Commonly known as:  [facility] Take 2.5 mg by mouth daily. metFORMIN 1,000 mg tablet Commonly known as:  GLUCOPHAGE Take 1 Tab by mouth two (2) times daily (with meals). potassium chloride 20 mEq tablet Commonly known as:  K-DUR, KLOR-CON  
take 1 tablet by mouth once daily PriLOSEC OTC 20 mg tablet Generic drug:  omeprazole Take 20 mg by mouth daily. SITagliptin 50 mg tablet Commonly known as:  [place] Becky Take 1 Tab by mouth daily. Indications: type 2 diabetes mellitus  
  
 valsartan-hydroCHLOROthiazide 160-25 mg per tablet Commonly known as:  DIOVAN-HCT  
take 1 tablet by mouth once daily  
  
 vitamin E 400 unit capsule Commonly known as:  [address] Take  by mouth two (2) times a day. Prescriptions Sent to Pharmacy Refills  
 fluticasone (FLONASE) 50 mcg/actuation nasal spray 5 Si Sprays by Both Nostrils route daily. Class: Normal  
 [facility] [address] [place] [address] #: [phone] Route: Both Nostrils Follow-up Instructions Return in about 4 weeks (around 2018), or if symptoms worsen or fail to improve, for fu PBA. To-Do List   
 2018 10:00 AM  
  Appointment with MAURICIO MARADIAGA 2 at [address] [place] [address] ([phone]) OUTSIDE FILMS  - Any outside films related to the study being scheduled should be brought with you on the day of the exam.  If this cannot be done there may be a delay in the reading of the study.   MEDICATIONS  - Patient must bring a complete list of all medications currently taking to include prescriptions, over-the-counter meds, herbals, vitamins & any dietary supplements  GENERAL INSTRUCTIONS  - On the day of your exam do not use any bath powder, deodorant or lotions on the armpit area. Patient Instructions Learning About Diabetes Food Guidelines Your Care Instructions Meal planning is important to manage diabetes. It helps keep your blood sugar at a target level (which you set with your doctor). You don't have to eat special foods. You can eat what your family eats, including sweets once in a while. But you do have to pay attention to how often you eat and how much you eat of certain foods. You may want to work with a dietitian or a certified diabetes educator (CDE) to help you plan meals and snacks. A dietitian or CDE can also help you lose weight if that is one of your goals. What should you know about eating carbs? Managing the amount of carbohydrate (carbs) you eat is an important part of healthy meals when you have diabetes. Carbohydrate is found in many foods. · Learn which foods have carbs. And learn the amounts of carbs in different foods. ¨ Bread, cereal, pasta, and rice have about 15 grams of carbs in a serving. A serving is 1 slice of bread (1 ounce), ½ cup of cooked cereal, or 1/3 cup of cooked pasta or rice. ¨ Fruits have 15 grams of carbs in a serving. A serving is 1 small fresh fruit, such as an apple or orange; ½ of a banana; ½ cup of cooked or canned fruit; ½ cup of fruit juice; 1 cup of melon or raspberries; or 2 tablespoons of dried fruit. ¨ Milk and no-sugar-added yogurt have 15 grams of carbs in a serving. A serving is 1 cup of milk or 2/3 cup of no-sugar-added yogurt. ¨ Starchy vegetables have 15 grams of carbs in a serving. A serving is ½ cup of mashed potatoes or sweet potato; 1 cup winter squash; ½ of a small baked potato; ½ cup of cooked beans; or ½ cup cooked corn or green peas. · Learn how much carbs to eat each day and at each meal. A dietitian or CDE can teach you how to keep track of the amount of carbs you eat. This is called carbohydrate counting. · If you are not sure how to count carbohydrate grams, use the Plate Method to plan meals. It is a good, quick way to make sure that you have a balanced meal. It also helps you spread carbs throughout the day. ¨ Divide your plate by types of foods. Put non-starchy vegetables on half the plate, meat or other protein food on one-quarter of the plate, and a grain or starchy vegetable in the final quarter of the plate. To this you can add a small piece of fruit and 1 cup of milk or yogurt, depending on how many carbs you are supposed to eat at a meal. 
· Try to eat about the same amount of carbs at each meal. Do not \"save up\" your daily allowance of carbs to eat at one meal. 
· Proteins have very little or no carbs per serving. Examples of proteins are beef, chicken, turkey, fish, eggs, tofu, cheese, cottage cheese, and peanut butter. A serving size of meat is 3 ounces, which is about the size of a deck of cards. Examples of meat substitute serving sizes (equal to 1 ounce of meat) are 1/4 cup of cottage cheese, 1 egg, 1 tablespoon of peanut butter, and ½ cup of tofu. How can you eat out and still eat healthy? · Learn to estimate the serving sizes of foods that have carbohydrate. If you measure food at home, it will be easier to estimate the amount in a serving of restaurant food. · If the meal you order has too much carbohydrate (such as potatoes, corn, or baked beans), ask to have a low-carbohydrate food instead. Ask for a salad or green vegetables. · If you use insulin, check your blood sugar before and after eating out to help you plan how much to eat in the future. · If you eat more carbohydrate at a meal than you had planned, take a walk or do other exercise. This will help lower your blood sugar. What else should you know? · Limit saturated fat, such as the fat from meat and dairy products. This is a healthy choice because people who have diabetes are at higher risk of heart disease. So choose lean cuts of meat and nonfat or low-fat dairy products. Use olive or canola oil instead of butter or shortening when cooking. · Don't skip meals. Your blood sugar may drop too low if you skip meals and take insulin or certain medicines for diabetes. · Check with your doctor before you drink alcohol. Alcohol can cause your blood sugar to drop too low. Alcohol can also cause a bad reaction if you take certain diabetes medicines. Follow-up care is a key part of your treatment and safety. Be sure to make and go to all appointments, and call your doctor if you are having problems. It's also a good idea to know your test results and keep a list of the medicines you take. Where can you learn more? Go to http://sravanAudibasearcadio.info/. Enter Q592 in the search box to learn more about \"Learning About Diabetes Food Guidelines. \" Current as of: March 13, 2017 Content Version: 11.4 © 9249-3200 [employer]. Care instructions adapted under license by [employer] (which disclaims liability or warranty for this information). If you have questions about a medical condition or this instruction, always ask your healthcare professional. [address] any warranty or liability for your use of this information. Learning About Pseudobulbar Affect (PBA) What is pseudobulbar affect (PBA)? Pseudobulbar affect (say \"dvi-wom-OKI-cintia AFF-ect\") is a problem in the brain that causes you to laugh or cry for no reason. Sudden fits of tears or laughter can come from nowhere. This behavior usually has nothing to do with what you're feeling. You can't control it. Normally, the \"feel\" and \"express\" parts of your brain work together.  But with PBA, the expressive part of your brain can trigger behavior on its own. This behavior tends to cause awkward social situations. Living with PBA can be very stressful. PBA can happen along with certain health problems that affect the brain, such as Parkinson's disease or damage from a stroke. Fortunately, there is medicine that can help improve PBA symptoms. What are the symptoms? When you have PBA, you may: · Suddenly cry or laugh for no reason. · Have trouble controlling how long or intensely you laugh or cry. · Feel none of the usual relief after crying or laughing. How is PBA treated? PBA is treated with medicine that affects certain brain chemicals. Medicines include: · Antidepressant medicine. · Dextromethorphan and quinidine (Nuedexta). Another key to living with PBA is support from people who understand it. Talk with people close to you about your condition. Be patient and kind to yourself. And ask for help when you need it. Follow-up care is a key part of your treatment and safety. Be sure to make and go to all appointments, and call your doctor if you are having problems. It's also a good idea to know your test results and keep a list of the medicines you take. Where can you learn more? Go to http://sravan-arcadio.info/. Enter R286 in the search box to learn more about \"Learning About Pseudobulbar Affect (PBA). \" 
Current as of: October 14, 2016 Content Version: 11.4 © 7255-2588 Healthwise, [employer]. Care instructions adapted under license by [employer] (which disclaims liability or warranty for this information). If you have questions about a medical condition or this instruction, always ask your healthcare professional. [address] any warranty or liability for your use of this information. Introducing [facility]! Iker Archibald introduces [employer] patient portal. Now you can access parts of your medical record, email your doctor's office, and request medication refills online. 1. In your internet browser, go to [email or web address] 2. Click on the First Time User? Click Here link in the Sign In box. You will see the New Member Sign Up page. 3. Enter your Roadmap Access Code exactly as it appears below. You will not need to use this code after youve completed the sign-up process. If you do not sign up before the expiration date, you must request a new code. · Roadmap Access Code: [email or web address] Expires: 7/10/2018  1:27 PM 
 
4. Enter the last four digits of your Social Security Number (xxxx) and Date of Birth (mm/dd/yyyy) as indicated and click Submit. You will be taken to the next sign-up page. 5. Create a Nicholas Haddox Recordst ID. This will be your Roadmap login ID and cannot be changed, so think of one that is secure and easy to remember. 6. Create a Roadmap password. You can change your password at any time. 7. Enter your Password Reset Question and Answer. This can be used at a later time if you forget your password. 8. Enter your e-mail address. You will receive e-mail notification when new information is available in [address] 9. Click Sign Up. You can now view and download portions of your medical record. 10. Click the Download Summary menu link to download a portable copy of your medical information. If you have questions, please visit the Frequently Asked Questions section of the Roadmap website. Remember, Roadmap is NOT to be used for urgent needs. For medical emergencies, dial 911. Now available from your iPhone and Android! Please provide this summary of care documentation to your next provider. Your primary care clinician is listed as Ilya Varma. If you have any questions after today's visit, please call [phone].

## 2018-06-13 NOTE — PROGRESS NOTES
HPI:    Lolly Andrade  is a 70 y.o.  female  patient who comes in today for follow up after tryng robitussin to see if it would help with crying spells. We have consider PBA after knee surgery, possibly an anoxic injury. However, since she had knee surgery she cannot speak of certain things without crying. Since taking the robitussin daily, she is no longer having these inopportune crying which started out as uncontrollable laughter and changed to the uncontrollable crying. She is amenable to trying Neudexta and hopeful. She also complains of pressure in left ear that fills and pops intermittently. She has a history of allergies. Patient denies SOB, CP, dizziness, headache. Current Outpatient Prescriptions   Medication Sig Dispense Refill    vitamin E (AQUA GEMS) 400 unit capsule Take  by mouth two (2) times a day.  fluticasone (FLONASE) 50 mcg/actuation nasal spray 2 Sprays by Both Nostrils route daily. 1 Bottle 5    ferrous sulfate 325 mg (65 mg iron) tablet take 1 tablet by mouth once daily BEFORE BREAKFAST 90 Tab 0    carvedilol (COREG) 6.25 mg tablet Take 6.25 mg by mouth two (2) times a day.  0    potassium chloride (K-DUR, KLOR-CON) 20 mEq tablet take 1 tablet by mouth once daily 90 Tab 1    allopurinol (ZYLOPRIM) 300 mg tablet take 1 tablet by mouth once daily 90 Tab 1    letrozole (FEMARA) 2.5 mg tablet Take 2.5 mg by mouth daily. 0    atorvastatin (LIPITOR) 80 mg tablet take 1 tablet by mouth once daily 90 Tab 1    fenofibrate nanocrystallized (TRICOR) 145 mg tablet Take 1 Tab by mouth daily. 90 Tab 1    amLODIPine (NORVASC) 5 mg tablet Take 1 Tab by mouth daily. 90 Tab 3    SITagliptin (JANUVIA) 50 mg tablet Take 1 Tab by mouth daily. Indications: type 2 diabetes mellitus 90 Tab 2    metFORMIN (GLUCOPHAGE) 1,000 mg tablet Take 1 Tab by mouth two (2) times daily (with meals).  180 Tab 3    valsartan-hydroCHLOROthiazide (DIOVAN-HCT) 160-25 mg per tablet take 1 tablet by mouth once daily 90 Tab 1    aspirin delayed-release 81 mg tablet Take  by mouth daily.  cyanocobalamin ER (VITAMIN B-12) 1,000 mcg tablet Take 1 tablet by mouth daily. 90 tablet 3    cholecalciferol (VITAMIN D3) 5,000 unit capsule Take 5,000 Units by mouth daily.  omeprazole (PRILOSEC OTC) 20 mg tablet Take 20 mg by mouth daily.         Allergies   Allergen Reactions    Sulfa (Sulfonamide Antibiotics) Other (comments)     nightmares      Past Medical History:   Diagnosis Date    Arthritis     Candida intertrigo 7/11/2011    Diabetes [address]     GERD (gastroesophageal reflux disease)     Gout     Heart attack ([facility] [address] 08/12/2017    mild (tx'd medically)    Hypercholesterolemia     Hypertension       Family History   Problem Relation Age of Onset    Hypertension Father     Heart Disease Father     Hypertension Brother     Diabetes Brother     Heart Disease Mother     Breast Cancer Other       Patient Active Problem List   Diagnosis Code    Hyperlipidemia E78.5    GERD (gastroesophageal reflux disease) K21.9    Gout M10.9    Vitamin D deficiency E55.9    HTN (hypertension) I10    Left thyroid nodule E04.1    Absolute anemia D64.9    Primary osteoarthritis of right knee M17.11    Arthritis of knee M17.10    Cataract of both eyes H26.9    Dry eyes, bilateral H04.123    Vitreous floaters of both eyes H43.393    Lung nodules R91.8    Takotsubo cardiomyopathy I51.81    Breast cancer of upper-inner quadrant of left female breast ([facility] [address] P53.540    Personal history of breast cancer Z85.3    Type 2 diabetes mellitus with nephropathy ([facility] [address] E11.21    Hypertriglyceridemia E78.1            ROS as pertinent in HPI    Visit Vitals    /71 (BP 1 Location: Left arm)    Pulse 71    Temp 98.4 °F (36.9 °C) (Oral)    Resp 16    Ht 5' 5\" (1.651 m)    Wt 203 lb (92.1 kg)    SpO2 97%    BMI 33.78 kg/m2        Physical Exam   Constitutional: She is oriented to person, place, and time and well-developed, well-nourished, and in no distress. HENT:   Head: Normocephalic and atraumatic. Right Ear: External ear normal.   Left Ear: External ear normal.   Neck: Normal range of motion. Neck supple. Cardiovascular: Normal rate, regular rhythm and normal heart sounds. Pulmonary/Chest: Effort normal and breath sounds normal.   Musculoskeletal: She exhibits no edema. Neurological: She is alert and oriented to person, place, and time. There are no preventive care reminders to display for this patient. Assessment/Plan:    Diagnoses and all orders for this visit:    1. PBA (pseudobulbar affect) - patient would like to start on neudexta. Will clear with cardiology first.      2. Takotsubo cardiomyopathy - stable, followed by cardiology    3. Eustachian tube disorder, left  -     fluticasone (FLONASE) 50 mcg/actuation nasal spray; 2 Sprays by Both Nostrils route daily. 4. Environmental allergies - take OTC allergy medication. Follow-up Disposition:  Return in about 4 weeks (around 7/11/2018), or if symptoms worsen or fail to improve, for fu PBA. Additional Notes: Discussed today's diagnosis, treatment plans. Discussed medication indications and side effects. Preventive Medicine:   Weight Management:  Mediterranean diet recommended as well as exercise for 30 minutes daily most days of the week. DASH diet info given. After Visit Summary: Provided and discussed printed patient instructions. Answered all questions and patient acknowledged understanding. Karen Navarro PA-C     I reviewed the patient's medical history, the physician assistant's findings on physical examination, the patient's diagnoses, and treatment plan as documented in the progress note. I concur with the treatment plan as documented. There are no additional recommendations at this time.     Magda Rai MD

## 2018-06-14 DIAGNOSIS — E78.5 HYPERLIPIDEMIA, UNSPECIFIED HYPERLIPIDEMIA TYPE: ICD-10-CM

## 2018-06-14 RX ORDER — FENOFIBRATE 145 MG/1
TABLET, COATED ORAL
Qty: 90 TAB | Refills: 0 | Status: SHIPPED | OUTPATIENT
Start: 2018-06-14 | End: 2018-06-21 | Stop reason: SDUPTHER

## 2018-06-21 ENCOUNTER — TELEPHONE (OUTPATIENT)
Dept: FAMILY MEDICINE CLINIC | Age: 68
End: 2018-06-21

## 2018-06-21 DIAGNOSIS — E78.5 HYPERLIPIDEMIA, UNSPECIFIED HYPERLIPIDEMIA TYPE: ICD-10-CM

## 2018-06-21 RX ORDER — FENOFIBRATE 145 MG/1
TABLET, COATED ORAL
Qty: 90 TAB | Refills: 0 | Status: SHIPPED | OUTPATIENT
Start: 2018-06-21 | End: 2018-08-27 | Stop reason: SDUPTHER

## 2018-06-27 PROBLEM — I51.81 TAKOTSUBO CARDIOMYOPATHY: Status: RESOLVED | Noted: 2017-09-26 | Resolved: 2018-06-27

## 2018-07-05 DIAGNOSIS — I10 ESSENTIAL HYPERTENSION: ICD-10-CM

## 2018-07-05 RX ORDER — VALSARTAN AND HYDROCHLOROTHIAZIDE 160; 25 MG/1; MG/1
TABLET ORAL
Qty: 90 TAB | Refills: 1 | Status: SHIPPED | OUTPATIENT
Start: 2018-07-05 | End: 2018-09-11 | Stop reason: ALTCHOICE

## 2018-07-17 DIAGNOSIS — E11.9 TYPE 2 DIABETES MELLITUS WITHOUT COMPLICATION, WITHOUT LONG-TERM CURRENT USE OF INSULIN (HCC): ICD-10-CM

## 2018-07-17 DIAGNOSIS — E78.5 HYPERLIPIDEMIA, UNSPECIFIED HYPERLIPIDEMIA TYPE: ICD-10-CM

## 2018-07-17 RX ORDER — ATORVASTATIN CALCIUM 80 MG/1
TABLET, FILM COATED ORAL
Qty: 90 TAB | Refills: 2 | Status: SHIPPED | OUTPATIENT
Start: 2018-07-17

## 2018-07-17 RX ORDER — METFORMIN HYDROCHLORIDE 1000 MG/1
1000 TABLET ORAL 2 TIMES DAILY WITH MEALS
Qty: 180 TAB | Refills: 2 | Status: SHIPPED | OUTPATIENT
Start: 2018-07-17

## 2018-07-17 NOTE — TELEPHONE ENCOUNTER
Requested Prescriptions     Pending Prescriptions Disp Refills    SITagliptin (JANUVIA) 50 mg tablet 90 Tab 2     Sig: Take 1 Tab by mouth daily. Indications: type 2 diabetes mellitus    metFORMIN (GLUCOPHAGE) 1,000 mg tablet 180 Tab 3     Sig: Take 1 Tab by mouth two (2) times daily (with meals).     dextromethorphan-quiNIDine (NUEDEXTA) 20-10 mg per capsule 60 Cap 0     Sig: Take once daily for 7 days, then increase to  every 12 hours  Indications: PSEUDOBULBAR AFFECT    atorvastatin (LIPITOR) 80 mg tablet 90 Tab 1

## 2018-07-17 NOTE — TELEPHONE ENCOUNTER
Last OV 6/13/2018  No future appointments  Last refill   Januvia 9/15/2017  Metformin 7/5/2017  Nuedexta 6/21/2018  Lipitor 1/10/2018  Last Lab 5/2/2018

## 2018-08-14 ENCOUNTER — TELEPHONE (OUTPATIENT)
Dept: SURGERY | Age: 68
End: 2018-08-14

## 2018-08-14 DIAGNOSIS — E11.9 TYPE 2 DIABETES MELLITUS WITHOUT COMPLICATION, WITHOUT LONG-TERM CURRENT USE OF INSULIN (HCC): ICD-10-CM

## 2018-08-14 NOTE — TELEPHONE ENCOUNTER
Patient left message on nurse line yesterday stating she had a couple questions, 1 was regarding when could she take a shower, and 2 was she had been coughing up some dark brownish color phlegm in which she had a breathing tube placed for surgery and was wondering if the tube could have caused that. Spoke with patient yesterday afternoon, She states that it was on Friday that the phlegm was discolored and now it is clear, denies pain or fever. Explained to patient she is able to take a shower. Patient verbally understood.

## 2018-08-23 DIAGNOSIS — E78.5 HYPERLIPIDEMIA, UNSPECIFIED HYPERLIPIDEMIA TYPE: ICD-10-CM

## 2018-08-23 DIAGNOSIS — D64.9 ANEMIA, UNSPECIFIED TYPE: ICD-10-CM

## 2018-08-27 ENCOUNTER — HOSPITAL ENCOUNTER (OUTPATIENT)
Dept: MAMMOGRAPHY | Age: 68
Discharge: HOME | End: 2018-08-27
Payer: COMMERCIAL

## 2018-08-27 DIAGNOSIS — C50.212 MALIGNANT NEOPLASM OF UPPER-INNER QUADRANT OF LEFT FEMALE BREAST, UNSPECIFIED ESTROGEN RECEPTOR STATUS (HCC): ICD-10-CM

## 2018-08-27 PROCEDURE — 77066 DX MAMMO INCL CAD BI: CPT

## 2018-08-27 NOTE — TELEPHONE ENCOUNTER
Patient called in to check on the status of medication refills. Per patient she would like to come to the office and  the script or have it mailed to her house. Patient is also asking for a 90 day supply. Do not send script to pharmacy. Please call patient when this has been done.        Tricor last filled 06/21/2018  Ferrous sulfate last filled 05/17/2018  Last OV 06/13/2018

## 2018-08-29 NOTE — TELEPHONE ENCOUNTER
Pt came into the office to  the scripts of both medications I informed her neither are are ready please advise.

## 2018-08-30 RX ORDER — FENOFIBRATE 145 MG/1
145 TABLET, COATED ORAL DAILY
Qty: 90 TAB | Refills: 0
Start: 2018-08-30 | End: 2018-09-05 | Stop reason: SDUPTHER

## 2018-08-30 RX ORDER — LANOLIN ALCOHOL/MO/W.PET/CERES
CREAM (GRAM) TOPICAL
Qty: 90 TAB | Refills: 0
Start: 2018-08-30

## 2018-09-04 ENCOUNTER — TELEPHONE (OUTPATIENT)
Dept: FAMILY MEDICINE CLINIC | Age: 68
End: 2018-09-04

## 2018-09-04 NOTE — TELEPHONE ENCOUNTER
Pt calling about picking up the prescription for Tricor. Please reprint prescription.  Printing error in [facility]

## 2018-09-05 DIAGNOSIS — E78.5 HYPERLIPIDEMIA, UNSPECIFIED HYPERLIPIDEMIA TYPE: ICD-10-CM

## 2018-09-05 DIAGNOSIS — D64.9 ANEMIA, UNSPECIFIED TYPE: ICD-10-CM

## 2018-09-05 RX ORDER — FENOFIBRATE 145 MG/1
145 TABLET, COATED ORAL DAILY
Qty: 90 TAB | Refills: 0 | Status: SHIPPED | OUTPATIENT
Start: 2018-09-05

## 2018-09-05 NOTE — TELEPHONE ENCOUNTER
Tricor refilled and sent to pharmacy. Please call and notify pt that no further refills will be granted unless she makes a f/u appt.

## 2018-09-05 NOTE — TELEPHONE ENCOUNTER
Called patient to inform her of the below message. Left name and call back number. Tricor refilled and sent to pharmacy. Please call and notify pt that no further refills will be granted unless she makes a f/u appt.

## 2018-09-11 ENCOUNTER — TELEPHONE (OUTPATIENT)
Dept: FAMILY MEDICINE CLINIC | Age: 68
End: 2018-09-11

## 2018-09-11 RX ORDER — IRBESARTAN AND HYDROCHLOROTHIAZIDE 300; 12.5 MG/1; MG/1
1 TABLET, FILM COATED ORAL DAILY
Qty: 30 TAB | Refills: 2 | Status: SHIPPED | OUTPATIENT
Start: 2018-09-11

## 2018-09-11 NOTE — TELEPHONE ENCOUNTER
Patients medication for Valsartan has been recalled and the pharmacy is requesting an alternative medication. [facility] [phone].

## 2018-10-30 NOTE — TELEPHONE ENCOUNTER
Pt called in requesting refill of her   Requested Prescriptions     Pending Prescriptions Disp Refills    dextromethorphan-quiNIDine (NUEDEXTA) 20-10 mg per capsule 90 Cap 2     Sig: Take once daily for 7 days, then increase to  every 12 hours   .

## 2018-11-19 DIAGNOSIS — I10 ESSENTIAL HYPERTENSION: ICD-10-CM

## 2018-11-19 DIAGNOSIS — I51.81 TAKOTSUBO CARDIOMYOPATHY: ICD-10-CM

## 2018-11-19 RX ORDER — AMLODIPINE BESYLATE 5 MG/1
5 TABLET ORAL DAILY
Qty: 30 TAB | Refills: 3 | Status: SHIPPED | OUTPATIENT
Start: 2018-11-19

## 2018-11-19 RX ORDER — CARVEDILOL 6.25 MG/1
6.25 TABLET ORAL 2 TIMES DAILY
Qty: 60 TAB | Refills: 3 | Status: SHIPPED | OUTPATIENT
Start: 2018-11-19

## 2018-12-11 ENCOUNTER — TRANSCRIBE ORDERS (OUTPATIENT)
Dept: GENERAL RADIOLOGY | Facility: HOSPITAL | Age: 68
End: 2018-12-11

## 2018-12-11 ENCOUNTER — HOSPITAL ENCOUNTER (OUTPATIENT)
Dept: CT IMAGING | Facility: HOSPITAL | Age: 68
Discharge: HOME | End: 2018-12-11
Admitting: NURSE PRACTITIONER

## 2018-12-11 DIAGNOSIS — R10.9 RT FLANK PAIN: Primary | ICD-10-CM

## 2018-12-11 DIAGNOSIS — R10.9 RT FLANK PAIN: ICD-10-CM

## 2018-12-11 PROCEDURE — 74176 CT ABD & PELVIS W/O CONTRAST: CPT

## 2018-12-17 ENCOUNTER — TELEPHONE (OUTPATIENT)
Dept: FAMILY MEDICINE CLINIC | Age: 68
End: 2018-12-17

## 2018-12-17 NOTE — PROGRESS NOTES
Gary Prescott, I would love to talk to you. My office number is [phone]. If you get a minute, please call. I will also try to get in touch with you next week.   Ty! room air

## 2018-12-17 NOTE — TELEPHONE ENCOUNTER
Maki Medley with [employer] request return call re PA for Michelle Álvarez    Ph [phone]  Use ref [id number]

## 2019-05-14 DIAGNOSIS — E78.5 HYPERLIPIDEMIA, UNSPECIFIED HYPERLIPIDEMIA TYPE: ICD-10-CM

## 2019-05-14 RX ORDER — ATORVASTATIN CALCIUM 80 MG/1
TABLET, FILM COATED ORAL
Qty: 90 TAB | Refills: 0 | OUTPATIENT
Start: 2019-05-14

## 2019-05-14 NOTE — TELEPHONE ENCOUNTER
Spoke with patient she is aware an appointment needs to be scheduled before medication can be authorized. Patient states she is not longer seen at this practice.

## 2022-12-10 NOTE — TELEPHONE ENCOUNTER
Please notify patient refill has been approved and sent to pharmacy. FAMILY HISTORY:  No pertinent family history in first degree relatives

## 2024-11-27 NOTE — MR AVS SNAPSHOT
Visit Information Date & Time Provider Department Dept. Phone Encounter #  
 11/10/2017  1:45 PM Meghan Jorgensen MD [facility] 22 [id number] Your Appointments 11/29/2017  8:30 AM  
Follow Up with Meghan Jorgensen MD  
[address] [facility]) Appt Note: f/u L lumpectomy [address] [facility] [address] 4300 [facility]  
[phone]  
  
   
 [address] [address]  
  
    
 1/17/2018  9:00 AM  
LAB with [facility] NURSE [facility] ([facility]) Appt Note: 3 mos labs [address] [facility] [address] [address]  
[phone]  
  
   
 [address], [facility]  
  
    
 1/24/2018  2:00 PM  
Office Visit with Gavin Alcala PA-C [facility] ([facility]) Appt Note: 3 mos fu per [facility] [address] [address]  
[phone]  
  
   
 [address], [address] [address]  
  
    
 3/26/2018  1:40 PM  
Follow Up with Kathy Caceres MD  
[facility] ([address]) Appt Note: 6 month f/up [place] [id number] [phone] [address] [place] [address] [address] Upcoming Health Maintenance Date Due  
 MEDICARE YEARLY EXAM 10/12/2017 HEMOGLOBIN A1C Q6M 3/5/2018 EYE EXAM RETINAL OR DILATED Q1 3/20/2018 LIPID PANEL Q1 9/5/2018 FOOT EXAM Q1 9/13/2018 MICROALBUMIN Q1 9/13/2018 GLAUCOMA SCREENING Q2Y 3/20/2019 COLONOSCOPY 7/23/2019 BREAST CANCER SCRN MAMMOGRAM 10/11/2019 DTaP/Tdap/Td series (2 - Td) 9/13/2027 Allergies as of 11/10/2017  Review Complete On: 11/10/2017 By: Meghan Jorgensen MD  
  
 Severity Noted Reaction Type Reactions Sulfa (Sulfonamide Antibiotics)  06/22/2010    Other (comments)  
 nightmares Current Immunizations  Reviewed on 10/13/2014 Name Date Influenza High Dose Vaccine PF 9/13/2017 Influenza Vaccine 10/13/2014, 12/16/2013, 12/24/2012 Influenza Vaccine (Quad) PF 10/11/2016, 12/1/2015  2:30 PM  
 Influenza Vaccine Split 11/30/2011 Pneumococcal Conjugate (PCV-13) 4/19/2017 Pneumococcal Polysaccharide (PPSV-23) 6/11/2014 TD Vaccine 10/8/2008 Tdap 9/13/2017 Not reviewed this visit You Were Diagnosed With   
  
 Codes Comments Malignant neoplasm of left female breast, unspecified estrogen receptor status, unspecified site of breast ([facility] [address]    -  Primary ICD-10-CM: C18.958 ICD-9-CM: 174.9 Vitals BP Resp OB Status Smoking Status 123/70 18 Hysterectomy Never Smoker Vitals History Preferred Pharmacy Pharmacy Name [facility] [address] [address] [phone] Your Updated Medication List  
  
   
This list is accurate as of: 11/10/17  3:42 PM.  Always use your most recent med list.  
  
  
  
  
 allopurinol 300 mg tablet Commonly known as:  ZYLOPRIM  
take 1 tablet by mouth once daily  
  
 amLODIPine 5 mg tablet Commonly known as:  Nilam Brenda Take 1 Tab by mouth daily. aspirin delayed-release 81 mg tablet Take  by mouth daily. atorvastatin 80 mg tablet Commonly known as:  LIPITOR Take 1 Tab by mouth daily. carvedilol 6.25 mg tablet Commonly known as:  Michael Marian Take 1 Tab by mouth two (2) times daily (with meals). cholecalciferol 5,000 unit capsule Commonly known as:  VITAMIN D3 Take 5,000 Units by mouth daily. cyanocobalamin ER 1,000 mcg tablet Commonly known as:  VITAMIN B-12 Take 1 tablet by mouth daily. docusate sodium 100 mg capsule Commonly known as:  Dormerary Sargent Take 1 capsule three times per week as needed for constipation  
  
 fenofibrate nanocrystallized 145 mg tablet Commonly known as:  [employer]  
 Take 1 Tab by mouth daily. ferrous sulfate 325 mg (65 mg iron) tablet  
take 1 tablet by mouth once daily (BEFORE BREAKFAST)  
  
 metFORMIN 1,000 mg tablet Commonly known as:  GLUCOPHAGE Take 1 Tab by mouth two (2) times daily (with meals). potassium chloride 20 mEq tablet Commonly known as:  K-DUR, KLOR-CON  
take 1 tablet by mouth once daily PriLOSEC OTC 20 mg tablet Generic drug:  omeprazole Take 20 mg by mouth daily. SITagliptin 50 mg tablet Commonly known as:  Omid Ashley Take 1 Tab by mouth daily. Indications: type 2 diabetes mellitus  
  
 valsartan-hydroCHLOROthiazide 160-25 mg per tablet Commonly known as:  DIOVAN-HCT  
take 1 tablet by mouth once daily To-Do List   
 11/10/2017 Lab:  CBC WITH AUTOMATED DIFF   
  
 11/10/2017 Lab:  METABOLIC PANEL, BASIC   
  
 11/13/2017 Imaging:  NM LYMPHOSCINTIG LT BREAST   
  
 11/13/2017 1:30 PM  
  Appointment with [address] [place] [address] at [facility]  [place] [facility] ([phone]) MEDICATIONS  - Patient must bring a complete list of all medications currently taking to include prescriptions, over-the-counter meds, herbals, vitamins & any dietary supplements  STUDY DETAILS  - Patient must be NPO (Nothing to eat/drink) after Midnight day prior to exam. - There is no other prep for the nuclear medicine lympho study. 11/14/2017 Imaging:  [facility] NDL/WIRE/CLIP/SEED BREAST LT   
  
 03/07/2018 1:30 PM  
  Appointment with HBV- [id number] MACHINE (WT ) at [facility] NON-INVASIVE CARD ([phone]) Age Limit for ALL Heart procedures @ all [facility] facilities: 18 yrs and older only. Under the age of 25, refer to [facility] ([phone]). Wt Limit: 350lbs. This study requires patient to bring a written physician's order or MD office may fax the order to [facility] at [phone]. Patient needs to bring a current list of all medications. No preparation is required for this study.   Patients should report 15 minutes prior to their appointment time to the [facility], [address]. Introducing [facility]! Mayo Patel introduces [employer] patient portal. Now you can access parts of your medical record, email your doctor's office, and request medication refills online. 1. In your internet browser, go to [email or web address] 2. Click on the First Time User? Click Here link in the Sign In box. You will see the New Member Sign Up page. 3. Enter your [employer] Access Code exactly as it appears below. You will not need to use this code after youve completed the sign-up process. If you do not sign up before the expiration date, you must request a new code. · [employer] Access Code: [email or web address] Expires: 12/12/2017  4:40 PM 
 
4. Enter the last four digits of your Social Security Number (xxxx) and Date of Birth (mm/dd/yyyy) as indicated and click Submit. You will be taken to the next sign-up page. 5. Create a [employer] ID. This will be your [employer] login ID and cannot be changed, so think of one that is secure and easy to remember. 6. Create a [employer] password. You can change your password at any time. 7. Enter your Password Reset Question and Answer. This can be used at a later time if you forget your password. 8. Enter your e-mail address. You will receive e-mail notification when new information is available in [address] 9. Click Sign Up. You can now view and download portions of your medical record. 10. Click the Download Summary menu link to download a portable copy of your medical information. If you have questions, please visit the Frequently Asked Questions section of the [employer] website. Remember, [employer] is NOT to be used for urgent needs. For medical emergencies, dial 911. Now available from your iPhone and Android! Please provide this summary of care documentation to your next provider. Your primary care clinician is listed as [place]. If you have any questions after today's visit, please call [phone].

## (undated) DEVICE — DRAPE TWL SURG 16X26IN BLU ORB04] ALLCARE INC]

## (undated) DEVICE — SUT SLK 2-0SH 30IN BLK --

## (undated) DEVICE — FLEX ADVANTAGE 3000CC: Brand: FLEX ADVANTAGE

## (undated) DEVICE — 3M(TM) MEDIPORE(TM) +PAD SOFT CLOTH ADHESIVE WOUND DRESSING 3569: Brand: 3M™ MEDIPORE™

## (undated) DEVICE — (D)SYR 10ML 1/5ML GRAD NSAF -- PKGING CHANGE USE ITEM 338027

## (undated) DEVICE — 3M™ STERI-STRIP™ COMPOUND BENZOIN TINCTURE 40 BAGS/CARTON 4 CARTONS/CASE C1544: Brand: 3M™ STERI-STRIP™

## (undated) DEVICE — KIT CLN UP BON SECOURS MARYV

## (undated) DEVICE — INTENDED FOR TISSUE SEPARATION, AND OTHER PROCEDURES THAT REQUIRE A SHARP SURGICAL BLADE TO PUNCTURE OR CUT.: Brand: BARD-PARKER SAFETY BLADES SIZE 15, STERILE

## (undated) DEVICE — TRAY PREP DRY W/ PREM GLV 2 APPL 6 SPNG 2 UNDPD 1 OVERWRAP

## (undated) DEVICE — REM POLYHESIVE ADULT PATIENT RETURN ELECTRODE: Brand: VALLEYLAB

## (undated) DEVICE — SUTURE MCRYL SZ 4-0 L18IN ABSRB UD L19MM PS-2 3/8 CIR PRIM Y496G

## (undated) DEVICE — 3M(TM) MEDIPORE(TM) +PAD SOFT CLOTH ADHESIVE WOUND DRESSING 3566: Brand: 3M™ MEDIPORE™

## (undated) DEVICE — STAPLER SKIN 35CT WD STRL DISP -- MULTIFIRE PREMIUM

## (undated) DEVICE — APPLIER CLP L9.375IN APER 2.1MM CLS L3.8MM 20 SM TI CLP

## (undated) DEVICE — APPLIER CLP AUTO MED 9.75 IN TI SURGCLP SUPER INTLOK 20 DISP

## (undated) DEVICE — SUTURE VCRL SZ 2-0 L12X18IN ABSRB UD POLYGLACTIN 910 BRAID J911T

## (undated) DEVICE — STERILE POLYISOPRENE POWDER-FREE SURGICAL GLOVES: Brand: PROTEXIS

## (undated) DEVICE — PACK PROCEDURE SURG MAJ W/ BASIN LF

## (undated) DEVICE — 3M™ WARMING BLANKET, LOWER BODY, 10 PER CASE, 42568: Brand: BAIR HUGGER™

## (undated) DEVICE — KERLIX BANDAGE ROLL: Brand: KERLIX

## (undated) DEVICE — DRAPE,CHEST,FENES,15X10,STERIL: Brand: MEDLINE

## (undated) DEVICE — (D)STRIP SKN CLSR 0.5X4IN WHT --

## (undated) DEVICE — 3M™ BAIR PAWS FLEX™ WARMING GOWN, STANDARD, 20 PER CASE 81003: Brand: BAIR PAWS™

## (undated) DEVICE — NEEDLE HYPO 25GA L1.5IN BVL ORIENTED ECLIPSE

## (undated) DEVICE — GAUZE SPONGES,USP TYPE VII GAUZE, 12 PLY: Brand: CURITY

## (undated) DEVICE — NEEDLE HYPO 27GA L1.25IN GRY POLYPR HUB S STL REG BVL STR

## (undated) DEVICE — 48" PROBE COVER W/GEL, ULTRASOUND, STERILE: Brand: SITE-RITE

## (undated) DEVICE — SUTURE VCRL SZ 3-0 L27IN ABSRB UD L26MM SH 1/2 CIR J416H